# Patient Record
Sex: FEMALE | Race: BLACK OR AFRICAN AMERICAN | NOT HISPANIC OR LATINO | Employment: OTHER | URBAN - METROPOLITAN AREA
[De-identification: names, ages, dates, MRNs, and addresses within clinical notes are randomized per-mention and may not be internally consistent; named-entity substitution may affect disease eponyms.]

---

## 2017-01-23 ENCOUNTER — OFFICE VISIT (OUTPATIENT)
Dept: URGENT CARE | Facility: CLINIC | Age: 57
End: 2017-01-23
Payer: MEDICARE

## 2017-01-23 VITALS
DIASTOLIC BLOOD PRESSURE: 100 MMHG | HEIGHT: 66 IN | TEMPERATURE: 101 F | SYSTOLIC BLOOD PRESSURE: 160 MMHG | OXYGEN SATURATION: 97 % | HEART RATE: 97 BPM | RESPIRATION RATE: 20 BRPM | BODY MASS INDEX: 38.32 KG/M2 | WEIGHT: 238.44 LBS

## 2017-01-23 DIAGNOSIS — R11.0 NAUSEA: ICD-10-CM

## 2017-01-23 DIAGNOSIS — R68.89 FLU-LIKE SYMPTOMS: ICD-10-CM

## 2017-01-23 DIAGNOSIS — R50.9 FEVER, UNSPECIFIED FEVER CAUSE: Primary | ICD-10-CM

## 2017-01-23 LAB
CTP QC/QA: YES
FLUAV AG NPH QL: NEGATIVE
FLUBV AG NPH QL: NEGATIVE

## 2017-01-23 PROCEDURE — 99204 OFFICE O/P NEW MOD 45 MIN: CPT | Mod: S$GLB,,, | Performed by: NURSE PRACTITIONER

## 2017-01-23 PROCEDURE — 87804 INFLUENZA ASSAY W/OPTIC: CPT | Mod: QW,S$GLB,, | Performed by: NURSE PRACTITIONER

## 2017-01-23 PROCEDURE — 1159F MED LIST DOCD IN RCRD: CPT | Mod: S$GLB,,, | Performed by: NURSE PRACTITIONER

## 2017-01-23 PROCEDURE — 99999 PR PBB SHADOW E&M-NEW PATIENT-LVL III: CPT | Mod: PBBFAC,,, | Performed by: NURSE PRACTITIONER

## 2017-01-23 RX ORDER — OSELTAMIVIR PHOSPHATE 75 MG/1
75 CAPSULE ORAL 2 TIMES DAILY
Qty: 10 CAPSULE | Refills: 0 | Status: SHIPPED | OUTPATIENT
Start: 2017-01-23 | End: 2017-01-28

## 2017-01-23 RX ORDER — CLOPIDOGREL BISULFATE 75 MG/1
75 TABLET ORAL DAILY
COMMUNITY
End: 2020-01-10

## 2017-01-23 RX ORDER — METOPROLOL SUCCINATE 25 MG/1
25 TABLET, EXTENDED RELEASE ORAL 2 TIMES DAILY
COMMUNITY
End: 2017-04-26

## 2017-01-23 RX ORDER — GUAIFENESIN/DEXTROMETHORPHAN 100-10MG/5
10 SYRUP ORAL
Qty: 118 ML | Refills: 0 | Status: SHIPPED | OUTPATIENT
Start: 2017-01-23 | End: 2017-02-02

## 2017-01-23 RX ORDER — GABAPENTIN 300 MG/1
300 CAPSULE ORAL 2 TIMES DAILY
COMMUNITY
End: 2018-10-12 | Stop reason: SDUPTHER

## 2017-01-23 RX ORDER — OSELTAMIVIR PHOSPHATE 75 MG/1
75 CAPSULE ORAL 2 TIMES DAILY
Qty: 10 CAPSULE | Refills: 0 | Status: SHIPPED | OUTPATIENT
Start: 2017-01-23 | End: 2017-01-23 | Stop reason: SDUPTHER

## 2017-01-23 RX ORDER — CLONIDINE HYDROCHLORIDE 0.1 MG/1
0.1 TABLET ORAL
COMMUNITY
End: 2020-01-10

## 2017-01-23 RX ORDER — ACETAMINOPHEN 325 MG/1
650 TABLET ORAL
Status: COMPLETED | OUTPATIENT
Start: 2017-01-23 | End: 2017-01-23

## 2017-01-23 RX ORDER — ONDANSETRON 4 MG/1
4 TABLET, ORALLY DISINTEGRATING ORAL EVERY 8 HOURS PRN
Qty: 10 TABLET | Refills: 0 | Status: SHIPPED | OUTPATIENT
Start: 2017-01-23 | End: 2017-01-23 | Stop reason: SDUPTHER

## 2017-01-23 RX ORDER — ONDANSETRON 4 MG/1
4 TABLET, ORALLY DISINTEGRATING ORAL EVERY 8 HOURS PRN
Qty: 10 TABLET | Refills: 0 | Status: SHIPPED | OUTPATIENT
Start: 2017-01-23 | End: 2018-10-11

## 2017-01-23 RX ORDER — GUAIFENESIN/DEXTROMETHORPHAN 100-10MG/5
10 SYRUP ORAL
Qty: 118 ML | Refills: 0 | Status: SHIPPED | OUTPATIENT
Start: 2017-01-23 | End: 2017-01-23 | Stop reason: SDUPTHER

## 2017-01-23 RX ORDER — ATORVASTATIN CALCIUM 40 MG/1
40 TABLET, FILM COATED ORAL DAILY
COMMUNITY
End: 2017-04-20

## 2017-01-23 RX ORDER — INSULIN ASPART 100 [IU]/ML
40 INJECTION, SUSPENSION SUBCUTANEOUS EVERY MORNING
COMMUNITY

## 2017-01-23 RX ORDER — PANTOPRAZOLE SODIUM 40 MG/1
40 TABLET, DELAYED RELEASE ORAL DAILY
COMMUNITY
End: 2020-01-10

## 2017-01-23 RX ORDER — INSULIN ASPART 100 [IU]/ML
28 INJECTION, SUSPENSION SUBCUTANEOUS NIGHTLY
COMMUNITY
End: 2017-04-20 | Stop reason: SDUPTHER

## 2017-01-23 RX ADMIN — ACETAMINOPHEN 650 MG: 325 TABLET ORAL at 08:01

## 2017-01-23 NOTE — MR AVS SNAPSHOT
[facility]  [address]  [facility] [place] [address]  Phone: [phone]                  Julia Preston   2017 7:30 PM   Office Visit    Description:  Female : 1960   Provider:  Trena Hanna NP   Department:  [facility]           Reason for Visit     Otalgia     Generalized Body Aches     Fever     Nausea     Emesis           Diagnoses this Visit        Comments    Fever, unspecified fever cause    -  Primary Hydrate and rest.  Continue to monitor temp.  Take Tylenol/Ibruprofen.      Flu-like symptoms     Take medications as ordered.  If no improvement, follow up with pcp.      Nausea                To Do List           Future Appointments        Provider Department Dept Phone    2017 1:30 PM Mini Ross MD [facility] [phone]      Goals (5 Years of Data)     None       These Medications        Disp Refills Start End    dextromethorphan-guaifenesin  mg/5 ml (ROBITUSSIN-DM)  mg/5 mL liquid 118 mL 0 2017    Take 10 mLs by mouth every 4 to 6 hours as needed. - Oral    Pharmacy: [facility] [address] [place] [address] Ph #: [phone]       oseltamivir (TAMIFLU) 75 MG capsule 10 capsule 0 2017    Take 1 capsule (75 mg total) by mouth 2 (two) times daily. - Oral    Pharmacy: [facility] [address] [place] [address] Ph #: [phone]       ondansetron (ZOFRAN-ODT) 4 MG TbDL 10 tablet 0 2017     Take 1 tablet (4 mg total) by mouth every 8 (eight) hours as needed (nausea). - Oral    Pharmacy: [facility] [address] [place] [address] Ph #: [phone]         [facility] On [facility]     [facility] On Call Nurse Care Line -  Assistance  Registered nurses in the [facility] On Call Center provide clinical advisement, health education, appointment booking, and other advisory services.  Call for this free service at [phone].              Medications           START taking these NEW medications        Refills    dextromethorphan-guaifenesin  mg/5 ml (ROBITUSSIN-DM)  mg/5 mL liquid 0    Sig: Take 10 mLs by mouth every 4 to 6 hours as needed.    Class: Normal    Route: Oral    oseltamivir (TAMIFLU) 75 MG capsule 0    Sig: Take 1 capsule (75 mg total) by mouth 2 (two) times daily.    Class: Normal    Route: Oral    ondansetron (ZOFRAN-ODT) 4 MG TbDL 0    Sig: Take 1 tablet (4 mg total) by mouth every 8 (eight) hours as needed (nausea).    Class: Normal    Route: Oral           Verify that the below list of medications is an accurate representation of the medications you are currently taking.  If none reported, the list may be blank. If incorrect, please contact your healthcare provider. Carry this list with you in case of emergency.           Current Medications     atorvastatin (LIPITOR) 40 MG tablet Take 40 mg by mouth once daily.    cloNIDine (CATAPRES) 0.1 MG tablet Take 0.1 mg by mouth.    clopidogrel (PLAVIX) 75 mg tablet Take 75 mg by mouth once daily.    dextromethorphan-guaifenesin  mg/5 ml (ROBITUSSIN-DM)  mg/5 mL liquid Take 10 mLs by mouth every 4 to 6 hours as needed.    gabapentin (NEURONTIN) 300 MG capsule Take 300 mg by mouth 2 (two) times daily.    insulin aspart protamine-insulin aspart (NOVOLOG 70/30) 100 unit/mL (70-30) InPn pen Inject 40 Units into the skin every morning.    insulin aspart protamine-insulin aspart (NOVOLOG 70/30) 100 unit/mL (70-30) InPn pen Inject 28 Units into the skin every evening.    metoprolol succinate (TOPROL-XL) 25 MG 24 hr tablet Take 25 mg by mouth 2 (two) times daily.    ondansetron (ZOFRAN-ODT) 4 MG TbDL Take 1 tablet (4 mg total) by mouth every 8 (eight) hours as needed (nausea).    oseltamivir (TAMIFLU) 75 MG capsule Take 1 capsule (75 mg total) by mouth 2 (two) times daily.    pantoprazole (PROTONIX) 40 MG tablet Take 40 mg by mouth once daily.           Clinical Reference  "Information           Vital Signs - Last Recorded  Most recent update: 1/23/2017  7:44 PM by Elvie Underwood LPN    BP Pulse Temp Resp Ht Wt    (!) 160/100 (BP Location: Right arm, Patient Position: Sitting, BP Method: Manual) 97 (!) 101 °F (38.3 °C) (Oral) 20 5' 6" (1.676 m) 108.2 kg (238 lb 6.8 oz)    SpO2 BMI             97% 38.48 kg/m2         Blood Pressure          Most Recent Value    BP  (!)  160/100      Allergies as of 1/23/2017     Lortab [Hydrocodone-acetaminophen]      Immunizations Administered on Date of Encounter - 1/23/2017     None      Orders Placed During Today's Visit      Normal Orders This Visit    POCT Influenza A/B       [facility] Sign-Up     Activating your [facility] account is as easy as 1-2-3!     1) Visit [email or web address], select Sign Up Now, enter this activation code and your date of birth, then select Next.  --  Expires: 3/9/2017  7:55 PM      2) Create a username and password to use when you visit [facility] in the future and select a security question in case you lose your password and select Next.    3) Enter your e-mail address and click Sign Up!    Additional Information  If you have questions, please e-mail [email or web address] or call [phone] to talk to our [facility] staff. Remember, [facility] is NOT to be used for urgent needs. For medical emergencies, dial 911.         Instructions      Febrile Illness, Uncertain Cause (Adult)  You have a fever, but the cause is not certain. A fever is a natural reaction of the body to an illness such as infection due to a virus or bacteria. In most cases, the temperature itself is not harmful. It actually helps the body fight infections. A fever does not need to be treated unless you feel very uncomfortable.  Sometimes a fever can be an early sign of a more serious infection, so make sure to follow up if your condition worsens.  Home care  Unless given other instructions by your healthcare provider, follow these " guidelines when caring for yourself at home.  General care  · If your symptoms are severe, rest at home for the first 2 to 3 days. When you resume activity, don't let yourself get too tired.  · Do not smoke. Also avoid being exposed to secondhand smoke.  · Your appetite may be poor, so a light diet is fine. Avoid dehydration by drinking 6 to 8 glasses of fluids per day (such as water, soft drinks, sports drinks, juices, tea, or soup). Extra fluids will help loosen secretions in the nose and lungs.  Medicines  · You can take acetaminophen or ibuprofen for pain, unless you were given a different fever-reducing/pain medicine to use. (Note: If you have chronic liver or kidney disease or have ever had a stomach ulcer or gastrointestinal bleeding, talk with your healthcare provider before using these medicines. Also talk to your provider if you are taking medicine to prevent blood clots.) Aspirin should never be given to anyone younger than 18 years of age who is ill with a viral infection or fever. It may cause severe liver or brain damage.  · If you were given antibiotics, take them until they are used up, or your healthcare provider tells you to stop. It is important to finish the antibiotics even though you feel better. This is to make sure the infection has cleared. Be aware that antibiotics are not usually given for a fever with an unknown cause.  · Over-the-counter medicines will not shorten the duration of the illness. However, they may be helpful for the following symptoms: cough, sore throat, or nasal and sinus congestion. Ask your pharmacist for product suggestions. (Note: Do not use decongestants if you have high blood pressure.)  Follow-up care  Follow up with your healthcare provider, or as advised.  · If a culture was done, you will be notified if your treatment needs to be changed. You can call as directed for the results.  · If X-rays, a CT, or an ultrasound were done, a specialist will review them. You  will be notified of any findings that may affect your care.  Call 911  Contact emergency services right away if any of these occur:  · Trouble breathing or swallowing, or wheezing  · Chest pain  · Confusion  · Extreme drowsiness or trouble awakening  · Fainting or loss of consciousness  · Rapid heart rate  · Low blood pressure  · Vomiting blood, or large amounts of blood in stool  · Seizure  When to seek medical advice  Call your healthcare provider right away if any of these occur:  · Cough with lots of colored sputum (mucus) or blood in your sputum  · Severe headache  · Face, neck, throat, or ear pain  · Feeling drowsy  · Abdominal pain  · Repeated vomiting or diarrhea  · Joint pain or a new rash  · Burning when urinating  · Fever of 100.4°F (38°C) or higher, that does not get better after taking fever-reducing medicine  · Feeling weak or dizzy  © 7724-6573 [employer]. [address] [place] [address], [place] [address]. All rights reserved. This information is not intended as a substitute for professional medical care. Always follow your healthcare professional's instructions.

## 2017-01-24 NOTE — PROGRESS NOTES
Subjective:       Patient ID: Julia Preston is a 56 y.o. female.    Chief Complaint: Otalgia; Generalized Body Aches; Fever; Nausea; and Emesis    HPI Comments: Patient presents with fever, body aches, and upper respiratory symptoms that started on yesterday.      Fever    This is a new problem. The current episode started yesterday. The problem occurs intermittently. The problem has been unchanged. The maximum temperature noted was 101 to 101.9 F. The temperature was taken using a tympanic thermometer. Associated symptoms include coughing, ear pain, muscle aches, nausea and vomiting (with coughing ). Pertinent negatives include no abdominal pain, headaches, rash, urinary pain or wheezing. She has tried nothing for the symptoms.     Review of Systems   Constitutional: Positive for fever.   HENT: Positive for ear pain, rhinorrhea and sinus pressure.    Eyes: Negative for discharge and redness.   Respiratory: Positive for cough. Negative for wheezing.    Gastrointestinal: Positive for nausea and vomiting (with coughing ). Negative for abdominal pain.   Genitourinary: Negative for dysuria.   Musculoskeletal: Positive for myalgias. Negative for joint swelling.   Skin: Negative for rash.   Neurological: Negative for dizziness and headaches.   Psychiatric/Behavioral: Negative for agitation and confusion.       Objective:      Physical Exam   Constitutional: She is oriented to person, place, and time. She appears well-developed and well-nourished. She appears lethargic. She has a sickly appearance. No distress.   HENT:   Head: Normocephalic and atraumatic.   Right Ear: Hearing, external ear and ear canal normal. A middle ear effusion (mild) is present.   Left Ear: Hearing, external ear and ear canal normal. A middle ear effusion (mild) is present.   Nose: Mucosal edema and rhinorrhea present.   Mouth/Throat: Oropharynx is clear and moist.   Neck: Normal range of motion.   Cardiovascular: Normal rate and regular rhythm.     Pulmonary/Chest: Effort normal and breath sounds normal.   Musculoskeletal: Normal range of motion.   Neurological: She is oriented to person, place, and time. She appears lethargic.   Skin: Skin is warm and dry.   Psychiatric: She has a normal mood and affect.   Nursing note and vitals reviewed.      Assessment:       1. Fever, unspecified fever cause    2. Flu-like symptoms    3. Nausea        Plan:         Fever, unspecified fever cause  Comments:  Hydrate and rest.  Continue to monitor temp.  Take Tylenol/Ibruprofen.    Orders:  -     Discontinue: oseltamivir (TAMIFLU) 75 MG capsule; Take 1 capsule (75 mg total) by mouth 2 (two) times daily.  Dispense: 10 capsule; Refill: 0  -     POCT Influenza A/B  -     oseltamivir (TAMIFLU) 75 MG capsule; Take 1 capsule (75 mg total) by mouth 2 (two) times daily.  Dispense: 10 capsule; Refill: 0  -     acetaminophen tablet 650 mg; Take 2 tablets (650 mg total) by mouth one time.    Flu-like symptoms  Comments:  Take medications as ordered.  If no improvement, follow up with pcp.    Orders:  -     Discontinue: oseltamivir (TAMIFLU) 75 MG capsule; Take 1 capsule (75 mg total) by mouth 2 (two) times daily.  Dispense: 10 capsule; Refill: 0  -     POCT Influenza A/B  -     oseltamivir (TAMIFLU) 75 MG capsule; Take 1 capsule (75 mg total) by mouth 2 (two) times daily.  Dispense: 10 capsule; Refill: 0    Nausea  -     Discontinue: ondansetron (ZOFRAN-ODT) 4 MG TbDL; Take 1 tablet (4 mg total) by mouth every 8 (eight) hours as needed (nausea).  Dispense: 10 tablet; Refill: 0  -     ondansetron (ZOFRAN-ODT) 4 MG TbDL; Take 1 tablet (4 mg total) by mouth every 8 (eight) hours as needed (nausea).  Dispense: 10 tablet; Refill: 0    Other orders  -     Discontinue: dextromethorphan-guaifenesin  mg/5 ml (ROBITUSSIN-DM)  mg/5 mL liquid; Take 10 mLs by mouth every 4 to 6 hours as needed.  Dispense: 118 mL; Refill: 0  -     dextromethorphan-guaifenesin  mg/5 ml  (ROBITUSSIN-DM)  mg/5 mL liquid; Take 10 mLs by mouth every 4 to 6 hours as needed.  Dispense: 118 mL; Refill: 0        Instructed to take all medications as ordered.  Informed if no improvement or symptoms worsens to follow up with primary care physician.  Informed to hydrate and rest.  Printed and review after visit summary with patient.

## 2017-01-24 NOTE — PATIENT INSTRUCTIONS
Febrile Illness, Uncertain Cause (Adult)  You have a fever, but the cause is not certain. A fever is a natural reaction of the body to an illness such as infection due to a virus or bacteria. In most cases, the temperature itself is not harmful. It actually helps the body fight infections. A fever does not need to be treated unless you feel very uncomfortable.  Sometimes a fever can be an early sign of a more serious infection, so make sure to follow up if your condition worsens.  Home care  Unless given other instructions by your healthcare provider, follow these guidelines when caring for yourself at home.  General care  · If your symptoms are severe, rest at home for the first 2 to 3 days. When you resume activity, don't let yourself get too tired.  · Do not smoke. Also avoid being exposed to secondhand smoke.  · Your appetite may be poor, so a light diet is fine. Avoid dehydration by drinking 6 to 8 glasses of fluids per day (such as water, soft drinks, sports drinks, juices, tea, or soup). Extra fluids will help loosen secretions in the nose and lungs.  Medicines  · You can take acetaminophen or ibuprofen for pain, unless you were given a different fever-reducing/pain medicine to use. (Note: If you have chronic liver or kidney disease or have ever had a stomach ulcer or gastrointestinal bleeding, talk with your healthcare provider before using these medicines. Also talk to your provider if you are taking medicine to prevent blood clots.) Aspirin should never be given to anyone younger than 18 years of age who is ill with a viral infection or fever. It may cause severe liver or brain damage.  · If you were given antibiotics, take them until they are used up, or your healthcare provider tells you to stop. It is important to finish the antibiotics even though you feel better. This is to make sure the infection has cleared. Be aware that antibiotics are not usually given for a fever with an unknown  cause.  · Over-the-counter medicines will not shorten the duration of the illness. However, they may be helpful for the following symptoms: cough, sore throat, or nasal and sinus congestion. Ask your pharmacist for product suggestions. (Note: Do not use decongestants if you have high blood pressure.)  Follow-up care  Follow up with your healthcare provider, or as advised.  · If a culture was done, you will be notified if your treatment needs to be changed. You can call as directed for the results.  · If X-rays, a CT, or an ultrasound were done, a specialist will review them. You will be notified of any findings that may affect your care.  Call 911  Contact emergency services right away if any of these occur:  · Trouble breathing or swallowing, or wheezing  · Chest pain  · Confusion  · Extreme drowsiness or trouble awakening  · Fainting or loss of consciousness  · Rapid heart rate  · Low blood pressure  · Vomiting blood, or large amounts of blood in stool  · Seizure  When to seek medical advice  Call your healthcare provider right away if any of these occur:  · Cough with lots of colored sputum (mucus) or blood in your sputum  · Severe headache  · Face, neck, throat, or ear pain  · Feeling drowsy  · Abdominal pain  · Repeated vomiting or diarrhea  · Joint pain or a new rash  · Burning when urinating  · Fever of 100.4°F (38°C) or higher, that does not get better after taking fever-reducing medicine  · Feeling weak or dizzy  © 9368-2707 [employer]. [address] [place] [address], [place] [address]. All rights reserved. This information is not intended as a substitute for professional medical care. Always follow your healthcare professional's instructions.

## 2017-04-20 ENCOUNTER — OFFICE VISIT (OUTPATIENT)
Dept: OBSTETRICS AND GYNECOLOGY | Facility: CLINIC | Age: 57
End: 2017-04-20
Payer: MEDICARE

## 2017-04-20 VITALS
SYSTOLIC BLOOD PRESSURE: 134 MMHG | HEIGHT: 66 IN | DIASTOLIC BLOOD PRESSURE: 80 MMHG | WEIGHT: 239.31 LBS | BODY MASS INDEX: 38.46 KG/M2

## 2017-04-20 DIAGNOSIS — Z01.419 ENCOUNTER FOR GYNECOLOGICAL EXAMINATION (GENERAL) (ROUTINE) WITHOUT ABNORMAL FINDINGS: Primary | ICD-10-CM

## 2017-04-20 DIAGNOSIS — Z12.31 SCREENING MAMMOGRAM, ENCOUNTER FOR: ICD-10-CM

## 2017-04-20 PROCEDURE — 99396 PREV VISIT EST AGE 40-64: CPT | Mod: S$GLB,,, | Performed by: OBSTETRICS & GYNECOLOGY

## 2017-04-20 PROCEDURE — 99999 PR PBB SHADOW E&M-EST. PATIENT-LVL III: CPT | Mod: PBBFAC,,, | Performed by: OBSTETRICS & GYNECOLOGY

## 2017-04-20 RX ORDER — LEVOFLOXACIN 500 MG/1
TABLET, FILM COATED ORAL
COMMUNITY
Start: 2017-01-25 | End: 2017-08-16

## 2017-04-20 RX ORDER — TRAMADOL HYDROCHLORIDE 50 MG/1
TABLET ORAL
COMMUNITY
Start: 2017-02-03 | End: 2017-08-16

## 2017-04-20 RX ORDER — ATORVASTATIN CALCIUM 80 MG/1
TABLET, FILM COATED ORAL
COMMUNITY
Start: 2017-03-28 | End: 2017-04-20

## 2017-04-20 RX ORDER — METOPROLOL TARTRATE 25 MG/1
TABLET, FILM COATED ORAL
COMMUNITY
Start: 2017-03-21 | End: 2017-08-16

## 2017-04-20 NOTE — PROGRESS NOTES
Subjective:       Patient ID: Julia Preston is a 56 y.o. female.    Chief Complaint:  Annual Exam      History of Present Illness  HPI  Annual Exam-Postmenopausal  Patient presents for annual exam. The patient has no complaints today. The patient is sexually active--denies pelvic pain; denies vaginal dryness. GYN screening history: last pap: approximate date cannot recall and was normal. The patient is not taking hormone replacement therapy. Patient denies post-menopausal vaginal bleeding. The patient wears seatbelts: yes. The patient participates in regular exercise: walks 30min bid; . Has the patient ever been transfused or tattooed?: no. The patient reports that there is not domestic violence in her life.    Denies hot flushes, night sweats, reports no problems with insomnia  Denies leak of urine;        GYN & OB History  No LMP recorded. Patient has had a hysterectomy.   Date of Last Pap: No result found    OB History    Para Term  AB SAB TAB Ectopic Multiple Living   4 4 4      1 5      # Outcome Date GA Lbr Erik/2nd Weight Sex Delivery Anes PTL Lv   4A Term      CS-Unspec   Y   4B Term      CS-Unspec   Y   3 Term      Vag-Spont   Y   2 Term      Vag-Spont   Y   1 Term      Vag-Spont   Y          Review of Systems  Review of Systems   Constitutional: Negative for activity change, appetite change, chills, diaphoresis, fatigue, fever and unexpected weight change.   HENT: Negative for mouth sores and tinnitus.    Eyes: Negative for discharge and visual disturbance.   Respiratory: Negative for cough, shortness of breath and wheezing.    Cardiovascular: Negative for chest pain, palpitations and leg swelling.   Gastrointestinal: Negative for abdominal pain, bloating, blood in stool, constipation, diarrhea, nausea and vomiting.   Endocrine: Negative for diabetes, hair loss, hot flashes, hyperthyroidism and hypothyroidism.   Genitourinary: Negative for decreased libido, dyspareunia, dysuria, flank pain,  frequency, genital sores, hematuria, menorrhagia, menstrual problem, pelvic pain, urgency, vaginal bleeding, vaginal discharge, vaginal pain, dysmenorrhea, urinary incontinence, postcoital bleeding, postmenopausal bleeding and vaginal odor.   Musculoskeletal: Negative for back pain and myalgias.   Skin:  Negative for rash, no acne and hair changes.   Neurological: Negative for seizures, syncope, numbness and headaches.   Hematological: Negative for adenopathy. Does not bruise/bleed easily.   Psychiatric/Behavioral: Negative for depression and sleep disturbance. The patient is not nervous/anxious.    Breast: Negative for breast mass, breast pain, nipple discharge and skin changes          Objective:    Physical Exam:   Constitutional: She appears well-developed.     Eyes: Conjunctivae and EOM are normal. Pupils are equal, round, and reactive to light.    Neck: Normal range of motion. Neck supple.     Pulmonary/Chest: Effort normal. Right breast exhibits no mass, no nipple discharge, no skin change and no tenderness. Left breast exhibits no mass, no nipple discharge, no skin change and no tenderness. Breasts are symmetrical.        Abdominal: Soft.     Genitourinary: Rectum normal and vagina normal. Pelvic exam was performed with patient supine. Uterus is absent. Right adnexum displays no mass and no tenderness. Left adnexum displays no mass and no tenderness. No erythema, bleeding, rectocele, cystocele or unspecified prolapse of vaginal walls in the vagina. No vaginal discharge found. Vaginal cuff normal.Labial bartholins normal.Cervix exhibits absence.           Musculoskeletal: Normal range of motion.       Neurological: She is alert.    Skin: Skin is warm.    Psychiatric: She has a normal mood and affect.          Assessment:        1. Encounter for gynecological examination (general) (routine) without abnormal findings    2. Screening mammogram, encounter for               Plan:      Continue annual well woman  exam.   pap not indicated due to age and hx of nml pap  Continue diet, exercise, weight loss  Prefers [facility] at [facility]

## 2017-04-20 NOTE — MR AVS SNAPSHOT
[facility]  [address] [place] [address] [address]  [place] [address]  Phone: [phone]                  Julia CLAY Kary   2017 1:30 PM   Office Visit    Description:  Female : 1960   Provider:  Mini Ross MD   Department:  [facility]           Reason for Visit     Annual Exam           Diagnoses this Visit        Comments    Encounter for gynecological examination (general) (routine) without abnormal findings    -  Primary     Screening mammogram, encounter for                To Do List           Future Appointments        Provider Department Dept Phone    2017 12:30 PM [facility] [id number]-SCR [facility] [phone]      Goals (5 Years of Data)     None      [facility]     [facility] Care Line -  Assistance  Unless otherwise directed by your provider, please contact [facility], our nurse care line that is available for  assistance.     Registered nurses in the [facility] provide: appointment scheduling, clinical advisement, health education, and other advisory services.  Call: [phone] (toll free)               Medications           STOP taking these medications     atorvastatin (LIPITOR) 40 MG tablet Take 40 mg by mouth once daily.    atorvastatin (LIPITOR) 80 MG tablet            Verify that the below list of medications is an accurate representation of the medications you are currently taking.  If none reported, the list may be blank. If incorrect, please contact your healthcare provider. Carry this list with you in case of emergency.           Current Medications     cloNIDine (CATAPRES) 0.1 MG tablet Take 0.1 mg by mouth.    clopidogrel (PLAVIX) 75 mg tablet Take 75 mg by mouth once daily.    gabapentin (NEURONTIN) 300 MG capsule Take 300 mg by mouth 2 (two) times daily.    metoprolol succinate (TOPROL-XL) 25 MG 24 hr tablet Take 25 mg by mouth 2 (two) times daily.    metoprolol  "tartrate (LOPRESSOR) 25 MG tablet     ondansetron (ZOFRAN-ODT) 4 MG TbDL Take 1 tablet (4 mg total) by mouth every 8 (eight) hours as needed (nausea).    pantoprazole (PROTONIX) 40 MG tablet Take 40 mg by mouth once daily.    tramadol (ULTRAM) 50 mg tablet     insulin aspart protamine-insulin aspart (NOVOLOG 70/30) 100 unit/mL (70-30) InPn pen Inject 40 Units into the skin every morning.    levoFLOXacin (LEVAQUIN) 500 MG tablet            Clinical Reference Information           Your Vitals Were     BP Height Weight BMI       134/80 5' 6" (1.676 m) 108.6 kg (239 lb 5 oz) 38.63 kg/m2       Blood Pressure          Most Recent Value    BP  134/80      Allergies as of 4/20/2017     Lortab [Hydrocodone-acetaminophen]      Immunizations Administered on Date of Encounter - 4/20/2017     None      Orders Placed During Today's Visit     Future Labs/Procedures Expected by Expires    Mammo Digital Screening Bilat with CAD  4/20/2017 6/21/2018      [facility] Sign-Up     Activating your [facility] account is as easy as 1-2-3!     1) Visit [email or web address], select Sign Up Now, enter this activation code and your date of birth, then select Next.  [email or web address]  Expires: 6/4/2017  1:31 PM      2) Create a username and password to use when you visit [facility] in the future and select a security question in case you lose your password and select Next.    3) Enter your e-mail address and click Sign Up!    Additional Information  If you have questions, please e-mail [email or web address] or call [phone] to talk to our [facility] staff. Remember, [facility] is NOT to be used for urgent needs. For medical emergencies, dial 911.         Language Assistance Services     ATTENTION: Language assistance services are available, free of charge. Please call [phone].      ATENCIÓN: Si habla español, tiene a orozco disposición servicios gratuitos de asistencia lingüística. Llame al [phone].     CHÚ Ý: N?u b?n nói Ti?ng Vi?t, có các " d?ch v? h? tr? ngôn ng? mi?n phí dành cho b?n. G?i s? [phone].         [facility] complies with applicable Federal civil rights laws and does not discriminate on the basis of race, color, national origin, age, disability, or sex.

## 2017-04-26 ENCOUNTER — OFFICE VISIT (OUTPATIENT)
Dept: PODIATRY | Facility: CLINIC | Age: 57
End: 2017-04-26
Payer: MEDICARE

## 2017-04-26 VITALS
SYSTOLIC BLOOD PRESSURE: 206 MMHG | WEIGHT: 246.69 LBS | HEIGHT: 66 IN | DIASTOLIC BLOOD PRESSURE: 116 MMHG | BODY MASS INDEX: 39.65 KG/M2

## 2017-04-26 DIAGNOSIS — E11.51 TYPE II DIABETES MELLITUS WITH PERIPHERAL CIRCULATORY DISORDER: Primary | ICD-10-CM

## 2017-04-26 DIAGNOSIS — M21.41 PES PLANUS OF BOTH FEET: ICD-10-CM

## 2017-04-26 DIAGNOSIS — L84 PRE-ULCERATIVE CALLUSES: ICD-10-CM

## 2017-04-26 DIAGNOSIS — M20.10 HAV (HALLUX ABDUCTO VALGUS), UNSPECIFIED LATERALITY: ICD-10-CM

## 2017-04-26 DIAGNOSIS — M21.42 PES PLANUS OF BOTH FEET: ICD-10-CM

## 2017-04-26 PROBLEM — K21.9 GERD (GASTROESOPHAGEAL REFLUX DISEASE): Status: ACTIVE | Noted: 2017-04-26

## 2017-04-26 PROBLEM — I10 HYPERTENSION: Status: ACTIVE | Noted: 2017-04-26

## 2017-04-26 PROBLEM — E78.5 HYPERLIPIDEMIA: Status: ACTIVE | Noted: 2017-04-26

## 2017-04-26 PROCEDURE — 3080F DIAST BP >= 90 MM HG: CPT | Mod: S$GLB,,, | Performed by: PODIATRIST

## 2017-04-26 PROCEDURE — 99499 UNLISTED E&M SERVICE: CPT | Mod: S$GLB,,, | Performed by: PODIATRIST

## 2017-04-26 PROCEDURE — 11055 PARING/CUTG B9 HYPRKER LES 1: CPT | Mod: Q8,S$GLB,, | Performed by: PODIATRIST

## 2017-04-26 PROCEDURE — 1160F RVW MEDS BY RX/DR IN RCRD: CPT | Mod: S$GLB,,, | Performed by: PODIATRIST

## 2017-04-26 PROCEDURE — 99204 OFFICE O/P NEW MOD 45 MIN: CPT | Mod: 25,S$GLB,, | Performed by: PODIATRIST

## 2017-04-26 PROCEDURE — 99999 PR PBB SHADOW E&M-EST. PATIENT-LVL III: CPT | Mod: PBBFAC,,, | Performed by: PODIATRIST

## 2017-04-26 PROCEDURE — 3077F SYST BP >= 140 MM HG: CPT | Mod: S$GLB,,, | Performed by: PODIATRIST

## 2017-04-26 NOTE — MR AVS SNAPSHOT
[facility]  [address] [facility] [address] [place] [address]  Phone: [phone]  Fax: [phone]                  Julia Preston   2017 2:20 PM   Office Visit    Description:  Female : 1960   Provider:  Viet Muir DPM   Department:  [facility]           Reason for Visit     Callouses           Diagnoses this Visit        Comments    Type II diabetes mellitus with peripheral circulatory disorder    -  Primary     Pre-ulcerative calluses         Pes planus of both feet         HAV (hallux abducto valgus), unspecified laterality                To Do List           Future Appointments        Provider Department Dept Phone    2017 12:30 PM [place] [address] [phone]    2017 3:00 PM Viet Muir DPM [facility] [phone]      Goals (5 Years of Data)     None      [facility] Call     [facility] Nurse Care Line -  Assistance  Unless otherwise directed by your provider, please contact [facility], our nurse care line that is available for  assistance.     Registered nurses in the [facility] provide: appointment scheduling, clinical advisement, health education, and other advisory services.  Call: [phone] (toll free)               Medications           STOP taking these medications     metoprolol succinate (TOPROL-XL) 25 MG 24 hr tablet Take 25 mg by mouth 2 (two) times daily.           Verify that the below list of medications is an accurate representation of the medications you are currently taking.  If none reported, the list may be blank. If incorrect, please contact your healthcare provider. Carry this list with you in case of emergency.           Current Medications     cloNIDine (CATAPRES) 0.1 MG tablet Take 0.1 mg by mouth.    clopidogrel (PLAVIX) 75 mg tablet Take 75 mg by mouth once daily.    gabapentin (NEURONTIN) 300 MG capsule Take 300 mg by mouth 2 (two) times daily.    insulin aspart  "protamine-insulin aspart (NOVOLOG 70/30) 100 unit/mL (70-30) InPn pen Inject 40 Units into the skin every morning.    levoFLOXacin (LEVAQUIN) 500 MG tablet     metoprolol tartrate (LOPRESSOR) 25 MG tablet     ondansetron (ZOFRAN-ODT) 4 MG TbDL Take 1 tablet (4 mg total) by mouth every 8 (eight) hours as needed (nausea).    pantoprazole (PROTONIX) 40 MG tablet Take 40 mg by mouth once daily.    tramadol (ULTRAM) 50 mg tablet            Clinical Reference Information           Your Vitals Were     BP Height Weight BMI       206/116 (BP Location: Left arm, Patient Position: Sitting, BP Method: Manual) 5' 6" (1.676 m) 111.9 kg (246 lb 11.1 oz) 39.82 kg/m2       Blood Pressure          Most Recent Value    BP  (!)  206/116 [pt states took bp meds, denies feelings of abnormality]      Allergies as of 4/26/2017     Lortab [Hydrocodone-acetaminophen]      Immunizations Administered on Date of Encounter - 4/26/2017     None      Orders Placed During Today's Visit      Normal Orders This Visit    DIABETIC SHOES FOR HOME USE       [facility] Sign-Up     Activating your [facility] account is as easy as 1-2-3!     1) Visit [email or web address], select Sign Up Now, enter this activation code and your date of birth, then select Next.  [email or web address]  Expires: 6/4/2017  1:31 PM      2) Create a username and password to use when you visit [facility] in the future and select a security question in case you lose your password and select Next.    3) Enter your e-mail address and click Sign Up!    Additional Information  If you have questions, please e-mail [email or web address] or call [phone] to talk to our [facility] staff. Remember, [facility] is NOT to be used for urgent needs. For medical emergencies, dial 911.         Instructions    [facility]  [address] [place] [address]   [phone]               Language Assistance Services     ATTENTION: Language assistance services are available, free of charge. Please call " [phone].      ATENCIÓN: Si habla español, tiene a orozco disposición servicios gratuitos de asistencia lingüística. Llame al [phone].     CHÚ Ý: N?u b?n nói Ti?ng Vi?t, có các d?ch v? h? tr? ngôn ng? mi?n phí dành cho b?n. G?i s? [phone].         [facility] complies with applicable Federal civil rights laws and does not discriminate on the basis of race, color, national origin, age, disability, or sex.

## 2017-04-26 NOTE — PROGRESS NOTES
Subjective:     Patient ID: Julia Preston is a 56 y.o. female.    Chief Complaint: Callouses (diabetic patient, bilateral foot (below the great toes), pain when walking due to the calluses)    Julia is a 56 y.o. female who presents to the clinic for evaluation and treatment of high risk feet. Julia has a past medical history of Diabetes mellitus, type 2; GERD (gastroesophageal reflux disease); Hyperlipidemia; and Hypertension. The patient's chief complaint is painful callus left foot. Patient states she has had wound there about a 1 year ago. Patient states callus and area became painful about 2 weeks ago. Patient denies any drainage from the foot. Patient states she wears diabetic tennis shoes in inserts daily. Patient also admits problems with circulation in the past.  This patient has documented high risk feet requiring routine maintenance secondary to diabetes mellitis and those secondary complications of diabetes, as mentioned..    PCP: Idania Currie MD    Date Last Seen by PCP: 4/2017    Current shoe gear:  Affected Foot: Rx diabetic extra depth shoes and custom accommodative insoles     Unaffected Foot: Rx diabetic extra depth shoes and custom accommodative insoles  Patient Active Problem List   Diagnosis    GERD (gastroesophageal reflux disease)    Type II diabetes mellitus with peripheral circulatory disorder    Hyperlipidemia    Hypertension       Medication List with Changes/Refills   Current Medications    CLONIDINE (CATAPRES) 0.1 MG TABLET    Take 0.1 mg by mouth.    CLOPIDOGREL (PLAVIX) 75 MG TABLET    Take 75 mg by mouth once daily.    GABAPENTIN (NEURONTIN) 300 MG CAPSULE    Take 300 mg by mouth 2 (two) times daily.    INSULIN ASPART PROTAMINE-INSULIN ASPART (NOVOLOG 70/30) 100 UNIT/ML (70-30) INPN PEN    Inject 40 Units into the skin every morning.    LEVOFLOXACIN (LEVAQUIN) 500 MG TABLET        METOPROLOL TARTRATE (LOPRESSOR) 25 MG TABLET        ONDANSETRON (ZOFRAN-ODT) 4 MG TBDL    " Take 1 tablet (4 mg total) by mouth every 8 (eight) hours as needed (nausea).    PANTOPRAZOLE (PROTONIX) 40 MG TABLET    Take 40 mg by mouth once daily.    TRAMADOL (ULTRAM) 50 MG TABLET       Discontinued Medications    METOPROLOL SUCCINATE (TOPROL-XL) 25 MG 24 HR TABLET    Take 25 mg by mouth 2 (two) times daily.       Review of patient's allergies indicates:   Allergen Reactions    Lortab [hydrocodone-acetaminophen] Itching       Past Surgical History:   Procedure Laterality Date    CARDIAC SURGERY  2013       Family History   Problem Relation Age of Onset    Hypertension Mother     Diabetes Mother     Hypertension Brother     Diabetes Brother     Seizures Son        Social History     Social History    Marital status:      Spouse name: N/A    Number of children: N/A    Years of education: N/A     Occupational History    Not on file.     Social History Main Topics    Smoking status: Never Smoker    Smokeless tobacco: Not on file    Alcohol use No    Drug use: No    Sexual activity: Yes     Partners: Male     Other Topics Concern    Not on file     Social History Narrative       Vitals:    04/26/17 1417   BP: (!) 206/116   Weight: 111.9 kg (246 lb 11.1 oz)   Height: 5' 6" (1.676 m)   PainSc: 0-No pain       Review of Systems   Constitutional: Negative for chills and fever.   Respiratory: Negative for shortness of breath.    Cardiovascular: Negative for chest pain, palpitations, orthopnea, claudication and leg swelling.   Gastrointestinal: Negative for diarrhea, nausea and vomiting.   Musculoskeletal: Negative for joint pain.   Skin: Negative for rash.   Neurological: Positive for tingling and sensory change. Negative for dizziness, focal weakness and weakness.   Psychiatric/Behavioral: Negative.              Objective:      PHYSICAL EXAM: Apperance: Alert and orient in no distress,well developed, and with good attention to grooming and body habits  Patient presents ambulating in diabetic " shoes with inserts.   LOWER EXTREMITY EXAM:  VASCULAR: Dorsalis pedis pulses 0/4(monphasic with doppler) bilateral and Posterior Tibial pulses 1/4 bilateral. Capillary fill time <4 seconds bilateral. No edema observed bilateral. Varicosities present bilateral. Skin temperature of the lower extremities is warm to cool, proximal to distal. Hair growth absent bilateral.  DERMATOLOGICAL: No skin rashes, subcutaneous nodules, lesions, or ulcers observed bilateral. Nails 1-5 bilateral thickened, and discolored with subungual debris. Webspaces 1-4 clean, dry and without evidence of break in skin integrity bilateral. Mild hyperkeratotic lesion with dry hemorraghic base noted to left plantar 1st submetatarsal. No open area post debridement. No erythema, drainage, or increased temp noted to area.   NEUROLOGICAL: Light touch, sharp-dull, proprioception all present and equal bilaterally.  Vibratory sensation diminished at bilateral hallux. Protective sensation absent at 4/10 sites as tested with a [place]-Brandy 5.07 monofilament.   MUSCULOSKELETAL: Muscle strength is 5/5 for foot inverters, everters, plantarflexors, and dorsiflexors. Muscle tone is normal.         Assessment:       Encounter Diagnoses   Name Primary?    Type II diabetes mellitus with peripheral circulatory disorder Yes    Pre-ulcerative calluses - Left Foot     Pes planus of both feet     HAV (hallux abducto valgus), unspecified laterality          Plan:   Type II diabetes mellitus with peripheral circulatory disorder  -     DIABETIC SHOES FOR HOME USE    Pre-ulcerative calluses - Left Foot  -     DIABETIC SHOES FOR HOME USE    Pes planus of both feet  -     DIABETIC SHOES FOR HOME USE    HAV (hallux abducto valgus), unspecified laterality  -     DIABETIC SHOES FOR HOME USE      I counseled the patient on her conditions, regarding findings of my examination, my impressions, and usual treatment plan.   Greater than 50% of this visit spent on counseling  and coordination of care.  Greater than 20 minutes spent on education about the diabetic foot, neuropathy, and prevention of limb loss.  Shoe inspection. Diabetic Foot Education. Patient reminded of the importance of good nutrition and blood sugar control to help prevent podiatric complications of diabetes. Patient instructed on proper foot hygeine. We discussed wearing proper shoe gear, daily foot inspections, never walking without protective shoe gear, never putting sharp instruments to feet.    With patient's permission, left foot pre-ulcerative callus trimmed in thickness with #15 blade in thickness without incident. The patient is alerted to watch for any signs of infection (redness, pus, pain, increased swelling or fever) and call if such occurs. Home wound care instructions are provided.  Prescription written for Diabetic shoes and inserts.   Felt offloading adjustments made to left shoe inserts.   Patient  will continue to monitor the areas daily, inspect feet, wear protective shoe gear when ambulatory, moisturizer to maintain skin integrity. Patient reminded of the importance of good nutrition and blood sugar control to help prevent podiatric complications of diabetes.  Patient to return 1 months or sooner if needed.               Viet Muir DPM  [facility]

## 2017-04-27 NOTE — PROGRESS NOTES
Patient, Julia Preston (MRN #[id number]), presented with a recorded BMI of 39.82 kg/m^2 and a documented comorbidity(s):  - Diabetes Mellitus Type 2  to which the severe obesity is a contributing factor. This is consistent with the definition of severe obesity (BMI 35.0-35.9) with comorbidity (ICD-10 E66.01, Z68.35). The patient's severe obesity was monitored, evaluated, addressed and/or treated. This addendum to the medical record is made on 04/26/2017.

## 2017-05-25 ENCOUNTER — OFFICE VISIT (OUTPATIENT)
Dept: PODIATRY | Facility: CLINIC | Age: 57
End: 2017-05-25
Payer: MEDICARE

## 2017-05-25 VITALS
BODY MASS INDEX: 38.48 KG/M2 | WEIGHT: 239.44 LBS | HEART RATE: 68 BPM | DIASTOLIC BLOOD PRESSURE: 95 MMHG | SYSTOLIC BLOOD PRESSURE: 218 MMHG | HEIGHT: 66 IN

## 2017-05-25 DIAGNOSIS — L84 PRE-ULCERATIVE CALLUSES: ICD-10-CM

## 2017-05-25 DIAGNOSIS — E11.51 TYPE II DIABETES MELLITUS WITH PERIPHERAL CIRCULATORY DISORDER: Primary | ICD-10-CM

## 2017-05-25 PROCEDURE — 99999 PR PBB SHADOW E&M-EST. PATIENT-LVL III: CPT | Mod: PBBFAC,,, | Performed by: PODIATRIST

## 2017-05-25 PROCEDURE — 99212 OFFICE O/P EST SF 10 MIN: CPT | Mod: S$GLB,,, | Performed by: PODIATRIST

## 2017-05-25 NOTE — PROGRESS NOTES
Subjective:     Patient ID: Julia Preston is a 56 y.o. female.    Chief Complaint: Follow-up (Left foot pre-ulcerative callous. Area appears to be closed and no drainage. ) and Diabetes Mellitus (Last PCP visit with -unknown. Today's glucose-200 mg/dL.)    Julia is a 56 y.o. female who presents to the clinic for evaluation and treatment of high risk feet. Julia has a past medical history of Diabetes mellitus, type 2; GERD (gastroesophageal reflux disease); Hyperlipidemia; and Hypertension. The patient's chief complaint is callus left foot. Patient states she has had no drainage from foot. Patient states she did get measured for Diabetic shoes. Patient states the pad added to her shoe has helped. Patient states her blood sugar this morning was 200mg/dl. Patient states she has had wound there about a 1 year ago. Patient states callus and area became painful about 2 weeks ago. Patient denies any drainage from the foot. Patient states she wears diabetic tennis shoes in inserts daily. Patient also admits problems with circulation in the past.  This patient has documented high risk feet requiring routine maintenance secondary to diabetes mellitis and those secondary complications of diabetes, as mentioned..    PCP: Idania Currie MD    Date Last Seen by PCP: 4/2017    Current shoe gear:  Affected Foot: Rx diabetic extra depth shoes and custom accommodative insoles     Unaffected Foot: Rx diabetic extra depth shoes and custom accommodative insoles  Patient Active Problem List   Diagnosis    GERD (gastroesophageal reflux disease)    Type II diabetes mellitus with peripheral circulatory disorder    Hyperlipidemia    Hypertension       Medication List with Changes/Refills   Current Medications    CLONIDINE (CATAPRES) 0.1 MG TABLET    Take 0.1 mg by mouth.    CLOPIDOGREL (PLAVIX) 75 MG TABLET    Take 75 mg by mouth once daily.    GABAPENTIN (NEURONTIN) 300 MG CAPSULE    Take 300 mg by mouth 2 (two)  "times daily.    INSULIN ASPART PROTAMINE-INSULIN ASPART (NOVOLOG 70/30) 100 UNIT/ML (70-30) INPN PEN    Inject 40 Units into the skin every morning.    LEVOFLOXACIN (LEVAQUIN) 500 MG TABLET        METOPROLOL TARTRATE (LOPRESSOR) 25 MG TABLET        ONDANSETRON (ZOFRAN-ODT) 4 MG TBDL    Take 1 tablet (4 mg total) by mouth every 8 (eight) hours as needed (nausea).    PANTOPRAZOLE (PROTONIX) 40 MG TABLET    Take 40 mg by mouth once daily.    TRAMADOL (ULTRAM) 50 MG TABLET           Review of patient's allergies indicates:   Allergen Reactions    Lortab [hydrocodone-acetaminophen] Itching       Past Surgical History:   Procedure Laterality Date    CARDIAC SURGERY  2013       Family History   Problem Relation Age of Onset    Hypertension Mother     Diabetes Mother     Hypertension Brother     Diabetes Brother     Seizures Son        Social History     Social History    Marital status:      Spouse name: N/A    Number of children: N/A    Years of education: N/A     Occupational History    Not on file.     Social History Main Topics    Smoking status: Never Smoker    Smokeless tobacco: Not on file    Alcohol use No    Drug use: No    Sexual activity: Yes     Partners: Male     Other Topics Concern    Not on file     Social History Narrative    No narrative on file       Vitals:    05/25/17 1427   BP: (!) 218/95   Pulse: 68   Weight: 108.6 kg (239 lb 6.7 oz)   Height: 5' 6" (1.676 m)   PainSc: 0-No pain       Review of Systems   Constitutional: Negative for chills and fever.   Respiratory: Negative for shortness of breath.    Cardiovascular: Negative for chest pain, palpitations, orthopnea, claudication and leg swelling.   Gastrointestinal: Negative for diarrhea, nausea and vomiting.   Musculoskeletal: Negative for joint pain.   Skin: Negative for rash.   Neurological: Positive for tingling and sensory change. Negative for dizziness, focal weakness and weakness.   Psychiatric/Behavioral: Negative.  "             Objective:      PHYSICAL EXAM: Apperance: Alert and orient in no distress,well developed, and with good attention to grooming and body habits  Patient presents ambulating in diabetic shoes with inserts.   LOWER EXTREMITY EXAM:  VASCULAR: Dorsalis pedis pulses 0/4(monphasic with doppler) bilateral and Posterior Tibial pulses 1/4 bilateral. Capillary fill time <4 seconds bilateral. No edema observed bilateral. Varicosities present bilateral. Skin temperature of the lower extremities is warm to cool, proximal to distal. Hair growth absent bilateral.  DERMATOLOGICAL: No skin rashes, subcutaneous nodules, lesions, or ulcers observed bilateral. Nails 1-5 bilateral thickened, and discolored with subungual debris. Webspaces 1-4 clean, dry and without evidence of break in skin integrity bilateral. Mild hyperkeratotic lesion with dry hemorraghic base noted to left plantar 1st submetatarsal. No open area post debridement. No erythema, drainage, or increased temp noted to area.   NEUROLOGICAL: Light touch, sharp-dull, proprioception all present and equal bilaterally.  Vibratory sensation diminished at bilateral hallux. Protective sensation absent at 4/10 sites as tested with a [place]-Brandy 5.07 monofilament.   MUSCULOSKELETAL: Muscle strength is 5/5 for foot inverters, everters, plantarflexors, and dorsiflexors. Muscle tone is normal.         Assessment:       Encounter Diagnoses   Name Primary?    Type II diabetes mellitus with peripheral circulatory disorder Yes    Pre-ulcerative calluses - Left Foot          Plan:   Type II diabetes mellitus with peripheral circulatory disorder    Pre-ulcerative calluses - Left Foot      I counseled the patient on her conditions, regarding findings of my examination, my impressions, and usual treatment plan.   Shoe inspection. Diabetic Foot Education. Patient reminded of the importance of good nutrition and blood sugar control to help prevent podiatric complications of  diabetes. Patient instructed on proper foot hygeine. We discussed wearing proper shoe gear, daily foot inspections, never walking without protective shoe gear, never putting sharp instruments to feet.    The patient is alerted to watch for any signs of infection (redness, pus, pain, increased swelling or fever) and call if such occurs. Home wound care instructions are provided.  Patient  will continue to monitor the areas daily, inspect feet, wear protective shoe gear when ambulatory, moisturizer to maintain skin integrity. Patient reminded of the importance of good nutrition and blood sugar control to help prevent podiatric complications of diabetes.  Patient to return 2 months or sooner if needed.               Viet Muir DPM  [facility]

## 2017-08-16 ENCOUNTER — OFFICE VISIT (OUTPATIENT)
Dept: PODIATRY | Facility: CLINIC | Age: 57
End: 2017-08-16
Payer: MEDICARE

## 2017-08-16 VITALS
SYSTOLIC BLOOD PRESSURE: 181 MMHG | WEIGHT: 230.19 LBS | DIASTOLIC BLOOD PRESSURE: 83 MMHG | BODY MASS INDEX: 36.99 KG/M2 | HEIGHT: 66 IN | HEART RATE: 81 BPM

## 2017-08-16 DIAGNOSIS — L84 PRE-ULCERATIVE CALLUSES: ICD-10-CM

## 2017-08-16 DIAGNOSIS — E11.51 TYPE II DIABETES MELLITUS WITH PERIPHERAL CIRCULATORY DISORDER: Primary | ICD-10-CM

## 2017-08-16 DIAGNOSIS — B35.1 DERMATOPHYTOSIS OF NAIL: ICD-10-CM

## 2017-08-16 PROCEDURE — 11055 PARING/CUTG B9 HYPRKER LES 1: CPT | Mod: Q8,S$GLB,, | Performed by: PODIATRIST

## 2017-08-16 PROCEDURE — 99999 PR PBB SHADOW E&M-EST. PATIENT-LVL III: CPT | Mod: PBBFAC,,, | Performed by: PODIATRIST

## 2017-08-16 PROCEDURE — 11721 DEBRIDE NAIL 6 OR MORE: CPT | Mod: 59,Q8,S$GLB, | Performed by: PODIATRIST

## 2017-08-16 PROCEDURE — 99499 UNLISTED E&M SERVICE: CPT | Mod: S$GLB,,, | Performed by: PODIATRIST

## 2017-08-16 RX ORDER — ASPIRIN 81 MG/1
81 TABLET ORAL DAILY
COMMUNITY

## 2017-08-16 RX ORDER — CARVEDILOL 25 MG/1
6.25 TABLET ORAL 2 TIMES DAILY WITH MEALS
COMMUNITY
End: 2018-10-11

## 2017-08-16 RX ORDER — AMLODIPINE BESYLATE 10 MG/1
10 TABLET ORAL DAILY
COMMUNITY
End: 2021-03-29

## 2017-08-16 RX ORDER — ATORVASTATIN CALCIUM 80 MG/1
80 TABLET, FILM COATED ORAL DAILY
COMMUNITY

## 2017-08-23 NOTE — PROGRESS NOTES
Subjective:     Patient ID: Julia Preston is a 56 y.o. female.    Chief Complaint: Routine Foot Care (PCP: Idania Currie 8/2017 , diabetic nail care/feet check ) and Foot Problem (left great toe )    Julia is a 56 y.o. female who presents to the clinic for evaluation and treatment of high risk feet. Julia has a past medical history of Diabetes mellitus, type 2; GERD (gastroesophageal reflux disease); High cholesterol; Hyperlipidemia; Hypertension; and Pneumonia (6/2017 or 7/2017 per the patient). The patient's chief complaint is painful callus left foot. Patient states the diabetic shoes were to narrow so she is still waiting on her new pair. Patient denies any open area or drainage from the foot. Patient states she wears her old diabetic tennis shoes and inserts daily. Patient also admits problems with circulation in the past.  This patient has documented high risk feet requiring routine maintenance secondary to diabetes mellitis and those secondary complications of diabetes, as mentioned..    PCP: Idania Currie MD    Date Last Seen by PCP: 8/2017    Current shoe gear:  Affected Foot: Rx diabetic extra depth shoes and custom accommodative insoles     Unaffected Foot: Rx diabetic extra depth shoes and custom accommodative insoles  Patient Active Problem List   Diagnosis    GERD (gastroesophageal reflux disease)    Type II diabetes mellitus with peripheral circulatory disorder    Hyperlipidemia    Hypertension       Medication List with Changes/Refills   Current Medications    AMLODIPINE (NORVASC) 10 MG TABLET    Take 10 mg by mouth once daily.    ASPIRIN (ECOTRIN) 81 MG EC TABLET    Take 81 mg by mouth once daily.    ATORVASTATIN (LIPITOR) 80 MG TABLET    Take 80 mg by mouth once daily.    CARVEDILOL (COREG) 25 MG TABLET    Take 25 mg by mouth 2 (two) times daily with meals.    CLONIDINE (CATAPRES) 0.1 MG TABLET    Take 0.1 mg by mouth.    CLOPIDOGREL (PLAVIX) 75 MG TABLET    Take 75 mg by mouth once  "daily.    GABAPENTIN (NEURONTIN) 300 MG CAPSULE    Take 300 mg by mouth 2 (two) times daily.    INSULIN ASPART PROTAMINE-INSULIN ASPART (NOVOLOG 70/30) 100 UNIT/ML (70-30) INPN PEN    Inject 40 Units into the skin every morning.    ONDANSETRON (ZOFRAN-ODT) 4 MG TBDL    Take 1 tablet (4 mg total) by mouth every 8 (eight) hours as needed (nausea).    PANTOPRAZOLE (PROTONIX) 40 MG TABLET    Take 40 mg by mouth once daily.   Discontinued Medications    LEVOFLOXACIN (LEVAQUIN) 500 MG TABLET        METOPROLOL TARTRATE (LOPRESSOR) 25 MG TABLET        TRAMADOL (ULTRAM) 50 MG TABLET           Review of patient's allergies indicates:   Allergen Reactions    Lortab [hydrocodone-acetaminophen] Itching       Past Surgical History:   Procedure Laterality Date    CARDIAC SURGERY  2013       Family History   Problem Relation Age of Onset    Hypertension Mother     Diabetes Mother     Hypertension Brother     Diabetes Brother     Seizures Son        Social History     Social History    Marital status:      Spouse name: N/A    Number of children: N/A    Years of education: N/A     Occupational History    Not on file.     Social History Main Topics    Smoking status: Never Smoker    Smokeless tobacco: Never Used    Alcohol use No    Drug use: No    Sexual activity: Yes     Partners: Male     Other Topics Concern    Not on file     Social History Narrative    No narrative on file       Vitals:    08/16/17 1339   BP: (!) 181/83   Pulse: 81   Weight: 104.4 kg (230 lb 2.6 oz)   Height: 5' 6" (1.676 m)   PainSc: 0-No pain       Review of Systems   Constitutional: Negative for chills and fever.   Respiratory: Negative for shortness of breath.    Cardiovascular: Negative for chest pain, palpitations, orthopnea, claudication and leg swelling.   Gastrointestinal: Negative for diarrhea, nausea and vomiting.   Musculoskeletal: Negative for joint pain.   Skin: Negative for rash.   Neurological: Positive for tingling and " sensory change. Negative for dizziness, focal weakness and weakness.   Psychiatric/Behavioral: Negative.              Objective:      PHYSICAL EXAM: Apperance: Alert and orient in no distress,well developed, and with good attention to grooming and body habits  Patient presents ambulating in diabetic shoes with inserts.   LOWER EXTREMITY EXAM:  VASCULAR: Dorsalis pedis pulses 0/4(monphasic with doppler) bilateral and Posterior Tibial pulses 1/4 bilateral. Capillary fill time <4 seconds bilateral. No edema observed bilateral. Varicosities present bilateral. Skin temperature of the lower extremities is warm to cool, proximal to distal. Hair growth absent bilateral.  DERMATOLOGICAL: No skin rashes, subcutaneous nodules, lesions, or ulcers observed bilateral. Nails 1,2,3,4,5 bilateral elongated, thickened, and discolored with subungual debris. Webspaces 1-4 clean, dry and without evidence of break in skin integrity bilateral. Mild hyperkeratotic lesion noted to left plantar 1st submetatarsal. No open area post debridement. No erythema, drainage, or increased temp noted to area.   NEUROLOGICAL: Light touch, sharp-dull, proprioception all present and equal bilaterally.  Vibratory sensation diminished at bilateral hallux. Protective sensation absent at 4/10 sites as tested with a [place]-Brandy 5.07 monofilament.   MUSCULOSKELETAL: Muscle strength is 5/5 for foot inverters, everters, plantarflexors, and dorsiflexors. Muscle tone is normal.         Assessment:       Encounter Diagnoses   Name Primary?    Type II diabetes mellitus with peripheral circulatory disorder Yes    Dermatophytosis of nail     Pre-ulcerative calluses - Left Foot          Plan:   Type II diabetes mellitus with peripheral circulatory disorder    Dermatophytosis of nail    Pre-ulcerative calluses - Left Foot      I counseled the patient on her conditions, regarding findings of my examination, my impressions, and usual treatment plan.   Shoe  inspection. Diabetic Foot Education. Patient reminded of the importance of good nutrition and blood sugar control to help prevent podiatric complications of diabetes. Patient instructed on proper foot hygeine. We discussed wearing proper shoe gear, daily foot inspections, never walking without protective shoe gear, never putting sharp instruments to feet.    With patient's permission, nails 1-5 bilateral were trimmed in length and thickness aggressively to their soft tissue attachment mechanically and with electric , removing all offending nail and debris. Patient relates relief following the procedure.  With patient's permission, left foot pre-ulcerative callus trimmed in thickness with #15 blade in thickness without incident. The patient is alerted to watch for any signs of infection (redness, pus, pain, increased swelling or fever) and call if such occurs. Home wound care instructions are provided.  Patient  will continue to monitor the areas daily, inspect feet, wear protective shoe gear when ambulatory, moisturizer to maintain skin integrity. Patient reminded of the importance of good nutrition and blood sugar control to help prevent podiatric complications of diabetes.  Patient to return 3 months or sooner if needed.               Viet Muir DPM  [facility]

## 2017-10-19 ENCOUNTER — OFFICE VISIT (OUTPATIENT)
Dept: PODIATRY | Facility: CLINIC | Age: 57
End: 2017-10-19
Payer: MEDICARE

## 2017-10-19 VITALS
DIASTOLIC BLOOD PRESSURE: 81 MMHG | SYSTOLIC BLOOD PRESSURE: 181 MMHG | HEIGHT: 66 IN | WEIGHT: 230 LBS | BODY MASS INDEX: 36.96 KG/M2 | HEART RATE: 83 BPM

## 2017-10-19 DIAGNOSIS — E11.51 TYPE II DIABETES MELLITUS WITH PERIPHERAL CIRCULATORY DISORDER: Primary | ICD-10-CM

## 2017-10-19 DIAGNOSIS — B35.1 DERMATOPHYTOSIS OF NAIL: ICD-10-CM

## 2017-10-19 DIAGNOSIS — L84 PRE-ULCERATIVE CALLUSES: ICD-10-CM

## 2017-10-19 PROCEDURE — 11055 PARING/CUTG B9 HYPRKER LES 1: CPT | Mod: Q8,S$GLB,, | Performed by: PODIATRIST

## 2017-10-19 PROCEDURE — 11721 DEBRIDE NAIL 6 OR MORE: CPT | Mod: 59,Q8,S$GLB, | Performed by: PODIATRIST

## 2017-10-19 PROCEDURE — 99999 PR PBB SHADOW E&M-EST. PATIENT-LVL III: CPT | Mod: PBBFAC,,, | Performed by: PODIATRIST

## 2017-10-19 PROCEDURE — 99499 UNLISTED E&M SERVICE: CPT | Mod: S$GLB,,, | Performed by: PODIATRIST

## 2017-10-19 NOTE — PROGRESS NOTES
Subjective:     Patient ID: Julia Preston is a 57 y.o. female.    Chief Complaint: Routine Foot Care (PCP: Idania Currie 10/2017 , diabetic nail care/feet check, patient c/o having swollen feet and PCP order blood work to figure out why her feet are swelling )    Julia is a 57 y.o. female who presents to the clinic for evaluation and treatment of high risk feet. Julia has a past medical history of Diabetes mellitus, type 2; GERD (gastroesophageal reflux disease); High cholesterol; Hyperlipidemia; Hypertension; and Pneumonia (6/2017 or 7/2017 per the patient). The patient's chief complaint is nail care. This patient has documented high risk feet requiring routine maintenance secondary to diabetes mellitis and those secondary complications of diabetes, as mentioned..    PCP: Idania Currie MD    Date Last Seen by PCP: 10/2017    Current shoe gear:  Affected Foot: Rx diabetic extra depth shoes and custom accommodative insoles     Unaffected Foot: Rx diabetic extra depth shoes and custom accommodative insoles  Patient Active Problem List   Diagnosis    GERD (gastroesophageal reflux disease)    Type II diabetes mellitus with peripheral circulatory disorder    Hyperlipidemia    Hypertension       Medication List with Changes/Refills   Current Medications    AMLODIPINE (NORVASC) 10 MG TABLET    Take 10 mg by mouth once daily.    ASPIRIN (ECOTRIN) 81 MG EC TABLET    Take 81 mg by mouth once daily.    ATORVASTATIN (LIPITOR) 80 MG TABLET    Take 80 mg by mouth once daily.    CARVEDILOL (COREG) 25 MG TABLET    Take 25 mg by mouth 2 (two) times daily with meals.    CLONIDINE (CATAPRES) 0.1 MG TABLET    Take 0.1 mg by mouth.    CLOPIDOGREL (PLAVIX) 75 MG TABLET    Take 75 mg by mouth once daily.    GABAPENTIN (NEURONTIN) 300 MG CAPSULE    Take 300 mg by mouth 2 (two) times daily.    INSULIN ASPART PROTAMINE-INSULIN ASPART (NOVOLOG 70/30) 100 UNIT/ML (70-30) INPN PEN    Inject 40 Units into the skin every morning.  "   ONDANSETRON (ZOFRAN-ODT) 4 MG TBDL    Take 1 tablet (4 mg total) by mouth every 8 (eight) hours as needed (nausea).    PANTOPRAZOLE (PROTONIX) 40 MG TABLET    Take 40 mg by mouth once daily.       Review of patient's allergies indicates:   Allergen Reactions    Lortab [hydrocodone-acetaminophen] Itching       Past Surgical History:   Procedure Laterality Date    CARDIAC SURGERY  2013       Family History   Problem Relation Age of Onset    Hypertension Mother     Diabetes Mother     Hypertension Brother     Diabetes Brother     Seizures Son        Social History     Social History    Marital status:      Spouse name: N/A    Number of children: N/A    Years of education: N/A     Occupational History    Not on file.     Social History Main Topics    Smoking status: Never Smoker    Smokeless tobacco: Never Used    Alcohol use No    Drug use: No    Sexual activity: Yes     Partners: Male     Other Topics Concern    Not on file     Social History Narrative    No narrative on file       Vitals:    10/19/17 1148 10/19/17 1158   BP: (!) 200/79 (!) 181/81   Pulse: 84 83   Weight: 104.3 kg (230 lb)    Height: 5' 6" (1.676 m)    PainSc: 0-No pain        Review of Systems   Constitutional: Negative for chills and fever.   Respiratory: Negative for shortness of breath.    Cardiovascular: Negative for chest pain, palpitations, orthopnea, claudication and leg swelling.   Gastrointestinal: Negative for diarrhea, nausea and vomiting.   Musculoskeletal: Negative for joint pain.   Skin: Negative for rash.   Neurological: Positive for tingling and sensory change. Negative for dizziness, focal weakness and weakness.   Psychiatric/Behavioral: Negative.            Objective:      PHYSICAL EXAM: Apperance: Alert and orient in no distress,well developed, and with good attention to grooming and body habits  Patient presents ambulating in diabetic shoes with inserts.   LOWER EXTREMITY EXAM:  VASCULAR: Dorsalis pedis " pulses 0/4(monphasic with doppler) bilateral and Posterior Tibial pulses 1/4 bilateral. Capillary fill time <4 seconds bilateral. No edema observed bilateral. Varicosities present bilateral. Skin temperature of the lower extremities is warm to cool, proximal to distal. Hair growth absent bilateral.  DERMATOLOGICAL: No skin rashes, subcutaneous nodules, lesions, or ulcers observed bilateral. Nails 1,2,3,4,5 bilateral elongated, thickened, and discolored with subungual debris. Webspaces 1-4 clean, dry and without evidence of break in skin integrity bilateral. Mild hyperkeratotic lesion noted to left plantar 1st submetatarsal. No open area post debridement. No erythema, drainage, or increased temp noted to area.   NEUROLOGICAL: Light touch, sharp-dull, proprioception all present and equal bilaterally.  Vibratory sensation diminished at bilateral hallux. Protective sensation absent at 4/10 sites as tested with a [place]-Brandy 5.07 monofilament.   MUSCULOSKELETAL: Muscle strength is 5/5 for foot inverters, everters, plantarflexors, and dorsiflexors. Muscle tone is normal.         Assessment:       Encounter Diagnoses   Name Primary?    Type II diabetes mellitus with peripheral circulatory disorder Yes    Dermatophytosis of nail     Pre-ulcerative calluses - Left Foot          Plan:   Type II diabetes mellitus with peripheral circulatory disorder    Dermatophytosis of nail    Pre-ulcerative calluses - Left Foot      I counseled the patient on her conditions, regarding findings of my examination, my impressions, and usual treatment plan.   Shoe inspection. Diabetic Foot Education. Patient reminded of the importance of good nutrition and blood sugar control to help prevent podiatric complications of diabetes. Patient instructed on proper foot hygeine. We discussed wearing proper shoe gear, daily foot inspections, never walking without protective shoe gear, never putting sharp instruments to feet.    With patient's  permission, nails 1-5 bilateral were debrided/trimmed in length and thickness aggressively to their soft tissue attachment mechanically and with electric , removing all offending nail and debris. Patient relates relief following the procedure.  With patient's permission, left foot pre-ulcerative callus trimmed in thickness with #15 blade in thickness without incident. The patient is alerted to watch for any signs of infection (redness, pus, pain, increased swelling or fever) and call if such occurs. Home wound care instructions are provided.  Patient  will continue to monitor the areas daily, inspect feet, wear protective shoe gear when ambulatory, moisturizer to maintain skin integrity. Patient reminded of the importance of good nutrition and blood sugar control to help prevent podiatric complications of diabetes.  Patient to return 3 months or sooner if needed.               Viet Muir DPM  [facility]

## 2018-01-11 ENCOUNTER — OFFICE VISIT (OUTPATIENT)
Dept: PODIATRY | Facility: CLINIC | Age: 58
End: 2018-01-11
Payer: MEDICARE

## 2018-01-11 VITALS
HEART RATE: 70 BPM | SYSTOLIC BLOOD PRESSURE: 234 MMHG | DIASTOLIC BLOOD PRESSURE: 90 MMHG | WEIGHT: 233.44 LBS | HEIGHT: 66 IN | BODY MASS INDEX: 37.52 KG/M2

## 2018-01-11 DIAGNOSIS — B35.1 DERMATOPHYTOSIS OF NAIL: ICD-10-CM

## 2018-01-11 DIAGNOSIS — E11.51 TYPE II DIABETES MELLITUS WITH PERIPHERAL CIRCULATORY DISORDER: Primary | ICD-10-CM

## 2018-01-11 DIAGNOSIS — L84 PRE-ULCERATIVE CALLUSES: ICD-10-CM

## 2018-01-11 PROCEDURE — 99999 PR PBB SHADOW E&M-EST. PATIENT-LVL III: CPT | Mod: PBBFAC,,, | Performed by: PODIATRIST

## 2018-01-11 PROCEDURE — 99499 UNLISTED E&M SERVICE: CPT | Mod: S$GLB,,, | Performed by: PODIATRIST

## 2018-01-11 PROCEDURE — 11721 DEBRIDE NAIL 6 OR MORE: CPT | Mod: 59,Q8,S$GLB, | Performed by: PODIATRIST

## 2018-01-11 PROCEDURE — 11055 PARING/CUTG B9 HYPRKER LES 1: CPT | Mod: Q8,S$GLB,, | Performed by: PODIATRIST

## 2018-01-11 RX ORDER — HYDRALAZINE HYDROCHLORIDE 50 MG/1
50 TABLET, FILM COATED ORAL 3 TIMES DAILY
COMMUNITY
End: 2018-10-11

## 2018-01-11 NOTE — PROGRESS NOTES
Subjective:     Patient ID: Julia Preston is a 57 y.o. female.    Chief Complaint: Nail Care (Patient states the bottom of her feet ache a lot at night. States no current pain.) and Diabetes Mellitus (Last PCP visit with - November 2017. )    Julia is a 57 y.o. female who presents to the clinic for evaluation and treatment of high risk feet. Julia has a past medical history of Diabetes mellitus, type 2; GERD (gastroesophageal reflux disease); High cholesterol; Hyperlipidemia; Hypertension; and Pneumonia (6/2017 or 7/2017 per the patient). The patient's chief complaint is nail care. This patient has documented high risk feet requiring routine maintenance secondary to diabetes mellitis and those secondary complications of diabetes, as mentioned..    PCP: Idania Currie MD    Date Last Seen by PCP: 11/2017    Current shoe gear:  Affected Foot: Rx diabetic extra depth shoes and custom accommodative insoles     Unaffected Foot: Rx diabetic extra depth shoes and custom accommodative insoles  Patient Active Problem List   Diagnosis    GERD (gastroesophageal reflux disease)    Type II diabetes mellitus with peripheral circulatory disorder    Hyperlipidemia    Hypertension       Medication List with Changes/Refills   Current Medications    AMLODIPINE (NORVASC) 10 MG TABLET    Take 10 mg by mouth once daily.    ASPIRIN (ECOTRIN) 81 MG EC TABLET    Take 81 mg by mouth once daily.    ATORVASTATIN (LIPITOR) 80 MG TABLET    Take 80 mg by mouth once daily.    CARVEDILOL (COREG) 25 MG TABLET    Take 6.25 mg by mouth 2 (two) times daily with meals.     CLONIDINE (CATAPRES) 0.1 MG TABLET    Take 0.1 mg by mouth.    CLOPIDOGREL (PLAVIX) 75 MG TABLET    Take 75 mg by mouth once daily.    GABAPENTIN (NEURONTIN) 300 MG CAPSULE    Take 300 mg by mouth 2 (two) times daily.    HYDRALAZINE (APRESOLINE) 50 MG TABLET    Take 50 mg by mouth 3 (three) times daily.    INSULIN ASPART PROTAMINE-INSULIN ASPART (NOVOLOG  "70/30) 100 UNIT/ML (70-30) INPN PEN    Inject 40 Units into the skin every morning.    ONDANSETRON (ZOFRAN-ODT) 4 MG TBDL    Take 1 tablet (4 mg total) by mouth every 8 (eight) hours as needed (nausea).    PANTOPRAZOLE (PROTONIX) 40 MG TABLET    Take 40 mg by mouth once daily.       Review of patient's allergies indicates:   Allergen Reactions    Lortab [hydrocodone-acetaminophen] Itching       Past Surgical History:   Procedure Laterality Date    CARDIAC SURGERY  2013       Family History   Problem Relation Age of Onset    Hypertension Mother     Diabetes Mother     Hypertension Brother     Diabetes Brother     Seizures Son        Social History     Social History    Marital status:      Spouse name: N/A    Number of children: N/A    Years of education: N/A     Occupational History    Not on file.     Social History Main Topics    Smoking status: Never Smoker    Smokeless tobacco: Never Used    Alcohol use No    Drug use: No    Sexual activity: Yes     Partners: Male     Other Topics Concern    Not on file     Social History Narrative    No narrative on file       Vitals:    01/11/18 1040   BP: (!) 234/90   Pulse: 70   Weight: 105.9 kg (233 lb 7.5 oz)   Height: 5' 6" (1.676 m)   PainSc: 0-No pain       Review of Systems   Constitutional: Negative for chills and fever.   Respiratory: Negative for shortness of breath.    Cardiovascular: Negative for chest pain, palpitations, orthopnea, claudication and leg swelling.   Gastrointestinal: Negative for diarrhea, nausea and vomiting.   Musculoskeletal: Negative for joint pain.   Skin: Negative for rash.   Neurological: Positive for tingling and sensory change. Negative for dizziness, focal weakness and weakness.   Psychiatric/Behavioral: Negative.            Objective:      PHYSICAL EXAM: Apperance: Alert and orient in no distress,well developed, and with good attention to grooming and body habits  Patient presents ambulating in diabetic shoes " with inserts.   LOWER EXTREMITY EXAM:  VASCULAR: Dorsalis pedis pulses 0/4(monphasic with doppler) bilateral and Posterior Tibial pulses 1/4 bilateral. Capillary fill time <4 seconds bilateral. No edema observed bilateral. Varicosities present bilateral. Skin temperature of the lower extremities is warm to cool, proximal to distal. Hair growth absent bilateral.  DERMATOLOGICAL: No skin rashes, subcutaneous nodules, lesions, or ulcers observed bilateral. Nails 1,2,3,4,5 bilateral elongated, thickened, and discolored with subungual debris. Webspaces 1-4 clean, dry and without evidence of break in skin integrity bilateral. Mild hyperkeratotic lesion noted to left plantar 1st submetatarsal. No open area post debridement. No erythema, drainage, or increased temp noted to area.   NEUROLOGICAL: Light touch, sharp-dull, proprioception all present and equal bilaterally.  Vibratory sensation diminished at bilateral hallux. Protective sensation absent at 4/10 sites as tested with a [place]-Brandy 5.07 monofilament.   MUSCULOSKELETAL: Muscle strength is 5/5 for foot inverters, everters, plantarflexors, and dorsiflexors. Muscle tone is normal.         Assessment:       Encounter Diagnoses   Name Primary?    Type II diabetes mellitus with peripheral circulatory disorder Yes    Dermatophytosis of nail     Pre-ulcerative calluses - Left Foot          Plan:   Type II diabetes mellitus with peripheral circulatory disorder    Dermatophytosis of nail    Pre-ulcerative calluses - Left Foot      I counseled the patient on her conditions, regarding findings of my examination, my impressions, and usual treatment plan.   Shoe inspection. Diabetic Foot Education. Patient reminded of the importance of good nutrition and blood sugar control to help prevent podiatric complications of diabetes. Patient instructed on proper foot hygeine. We discussed wearing proper shoe gear, daily foot inspections, never walking without protective shoe  gear, never putting sharp instruments to feet.    With patient's permission, nails 1-5 bilateral were debrided/trimmed in length and thickness aggressively to their soft tissue attachment mechanically and with electric , removing all offending nail and debris. Patient relates relief following the procedure.  With patient's permission, left foot pre-ulcerative callus trimmed in thickness with #15 blade in thickness without incident. The patient is alerted to watch for any signs of infection (redness, pus, pain, increased swelling or fever) and call if such occurs. Home wound care instructions are provided.  Patient  will continue to monitor the areas daily, inspect feet, wear protective shoe gear when ambulatory, moisturizer to maintain skin integrity. Patient reminded of the importance of good nutrition and blood sugar control to help prevent podiatric complications of diabetes.  Patient to return 3 months or sooner if needed.               Viet Muir DPM  [facility]

## 2018-01-11 NOTE — PROGRESS NOTES
Patient, Julia Preston (MRN #[id number]), presented with a recorded BMI of 37.68 kg/m^2 and a documented comorbidity(s):  - Diabetes Mellitus Type 2  to which the severe obesity is a contributing factor. This is consistent with the definition of severe obesity (BMI 35.0-35.9) with comorbidity (ICD-10 E66.01, Z68.35). The patient's severe obesity was monitored, evaluated, addressed and/or treated. This addendum to the medical record is made on 01/11/2018.

## 2018-04-06 ENCOUNTER — OFFICE VISIT (OUTPATIENT)
Dept: PODIATRY | Facility: CLINIC | Age: 58
End: 2018-04-06
Payer: MEDICARE

## 2018-04-06 VITALS
HEIGHT: 66 IN | DIASTOLIC BLOOD PRESSURE: 60 MMHG | HEART RATE: 67 BPM | WEIGHT: 239.63 LBS | BODY MASS INDEX: 38.51 KG/M2 | SYSTOLIC BLOOD PRESSURE: 135 MMHG

## 2018-04-06 DIAGNOSIS — E11.51 TYPE II DIABETES MELLITUS WITH PERIPHERAL CIRCULATORY DISORDER: ICD-10-CM

## 2018-04-06 DIAGNOSIS — L97.512 NEUROPATHIC ULCER OF RIGHT FOOT WITH FAT LAYER EXPOSED: Primary | ICD-10-CM

## 2018-04-06 DIAGNOSIS — E11.9 COMPREHENSIVE DIABETIC FOOT EXAMINATION, TYPE 2 DM, ENCOUNTER FOR: ICD-10-CM

## 2018-04-06 DIAGNOSIS — B35.1 DERMATOPHYTOSIS OF NAIL: ICD-10-CM

## 2018-04-06 DIAGNOSIS — L84 PRE-ULCERATIVE CALLUSES: ICD-10-CM

## 2018-04-06 PROCEDURE — 11042 DBRDMT SUBQ TIS 1ST 20SQCM/<: CPT | Mod: 59,RT,S$GLB, | Performed by: PODIATRIST

## 2018-04-06 PROCEDURE — 99214 OFFICE O/P EST MOD 30 MIN: CPT | Mod: 25,S$GLB,, | Performed by: PODIATRIST

## 2018-04-06 PROCEDURE — 99999 PR PBB SHADOW E&M-EST. PATIENT-LVL III: CPT | Mod: PBBFAC,,, | Performed by: PODIATRIST

## 2018-04-06 PROCEDURE — 11721 DEBRIDE NAIL 6 OR MORE: CPT | Mod: 59,Q8,S$GLB, | Performed by: PODIATRIST

## 2018-04-06 PROCEDURE — 11055 PARING/CUTG B9 HYPRKER LES 1: CPT | Mod: Q8,S$GLB,, | Performed by: PODIATRIST

## 2018-04-06 RX ORDER — FUROSEMIDE 20 MG/1
TABLET ORAL
COMMUNITY
Start: 2018-03-16

## 2018-04-06 NOTE — PROGRESS NOTES
Subjective:     Patient ID: Julia Preston is a 57 y.o. female.    Chief Complaint: Routine Foot Care (PCP: Idania Currie 2018 , diabetic nail care/feet check ) and Foot Problem (right foot (underneath), possible callus that has turned black)    Julia is a 57 y.o. female who presents to the clinic for evaluation and treatment of high risk feet. Julia has a past medical history of Congestive heart failure; Diabetes mellitus, type 2; GERD (gastroesophageal reflux disease); High cholesterol; Hyperlipidemia; Hypertension; Pneumonia (2017 or 2017 per the patient); and Pneumonia (2018). The patient's chief complaint is nail care.Patient states she wore some shoes to a  last week and thinks it caused a blister on her right foot. Patient denies any drainage from the foot.  This patient has documented high risk feet requiring routine maintenance secondary to diabetes mellitis and those secondary complications of diabetes, as mentioned..    PCP: Idania Currie MD    Date Last Seen by PCP:     Current shoe gear:  Affected Foot: Rx diabetic extra depth shoes and custom accommodative insoles     Unaffected Foot: Rx diabetic extra depth shoes and custom accommodative insoles     Patient Active Problem List   Diagnosis    GERD (gastroesophageal reflux disease)    Type II diabetes mellitus with peripheral circulatory disorder    Hyperlipidemia    Hypertension       Medication List with Changes/Refills   Current Medications    AMLODIPINE (NORVASC) 10 MG TABLET    Take 10 mg by mouth once daily.    ASPIRIN (ECOTRIN) 81 MG EC TABLET    Take 81 mg by mouth once daily.    ATORVASTATIN (LIPITOR) 80 MG TABLET    Take 80 mg by mouth once daily.    CARVEDILOL (COREG) 25 MG TABLET    Take 6.25 mg by mouth 2 (two) times daily with meals.     CLONIDINE (CATAPRES) 0.1 MG TABLET    Take 0.1 mg by mouth.    CLOPIDOGREL (PLAVIX) 75 MG TABLET    Take 75 mg by mouth once daily.    FUROSEMIDE (LASIX) 20 MG TABLET   "      GABAPENTIN (NEURONTIN) 300 MG CAPSULE    Take 300 mg by mouth 2 (two) times daily.    HYDRALAZINE (APRESOLINE) 50 MG TABLET    Take 50 mg by mouth 3 (three) times daily.    INSULIN ASPART PROTAMINE-INSULIN ASPART (NOVOLOG 70/30) 100 UNIT/ML (70-30) INPN PEN    Inject 40 Units into the skin every morning.    NIFEDIPINE (PROCARDIA XL) 60 MG (OSM) 24 HR TABLET    Take 60 mg by mouth once daily.    ONDANSETRON (ZOFRAN-ODT) 4 MG TBDL    Take 1 tablet (4 mg total) by mouth every 8 (eight) hours as needed (nausea).    PANTOPRAZOLE (PROTONIX) 40 MG TABLET    Take 40 mg by mouth once daily.       Review of patient's allergies indicates:   Allergen Reactions    Lortab [hydrocodone-acetaminophen] Itching       Past Surgical History:   Procedure Laterality Date    CARDIAC SURGERY  2013       Family History   Problem Relation Age of Onset    Hypertension Mother     Diabetes Mother     Hypertension Brother     Diabetes Brother     Seizures Son        Social History     Social History    Marital status:      Spouse name: N/A    Number of children: N/A    Years of education: N/A     Occupational History    Not on file.     Social History Main Topics    Smoking status: Never Smoker    Smokeless tobacco: Never Used    Alcohol use No    Drug use: No    Sexual activity: Yes     Partners: Male     Other Topics Concern    Not on file     Social History Narrative    No narrative on file       Vitals:    04/06/18 0852   BP: 135/60   Pulse: 67   Weight: 108.7 kg (239 lb 10.2 oz)   Height: 5' 6" (1.676 m)   PainSc: 0-No pain       Review of Systems   Constitutional: Negative for chills and fever.   Respiratory: Negative for shortness of breath.    Cardiovascular: Negative for chest pain, palpitations, orthopnea, claudication and leg swelling.   Gastrointestinal: Negative for diarrhea, nausea and vomiting.   Musculoskeletal: Negative for joint pain.   Skin: Negative for rash.   Neurological: Positive for " tingling and sensory change. Negative for dizziness, focal weakness and weakness.   Psychiatric/Behavioral: Negative.            Objective:      PHYSICAL EXAM: Apperance: Alert and orient in no distress,well developed, and with good attention to grooming and body habits  Patient presents ambulating in diabetic shoes with inserts.   LOWER EXTREMITY EXAM:  VASCULAR: Dorsalis pedis pulses 0/4(monphasic with doppler) bilateral and Posterior Tibial pulses 1/4 bilateral. Capillary fill time <4 seconds bilateral. No edema observed bilateral. Varicosities present bilateral. Skin temperature of the lower extremities is warm to cool, proximal to distal. Hair growth absent bilateral. (-) cellulits noted to right foot  DERMATOLOGICAL:  (--) edema, (--) erythema, (--) malodor, (--)  drainage, (--) warmth to right foot.  Ulcer noted to right 1st planar submetatarsal with macerated base and with a 1 mm yellow/white border and hyperkeratosis surrounding ulcer. Ulcer measurement  3cm x 2cm.  The ulcer does not extend into deeper tissue and (--) sinus tracts exist.  The dorsum surface of the feet are soft and supple.  The plantar aspects of both feet are dry and scaly. Nails 1,2,3,4,5 bilateral elongated, thickened, and discolored with subungual debris. Webspaces 1-4 clean, dry and without evidence of break in skin integrity bilateral. Mild hyperkeratotic lesion noted to left plantar 1st submetatarsal. No open area post debridement. No erythema, drainage, or increased temp noted to area.   NEUROLOGICAL: Light touch, sharp-dull, proprioception all present and equal bilaterally.  Vibratory sensation diminished at bilateral hallux. Protective sensation absent at 4/10 sites as tested with a [place]-Brandy 5.07 monofilament.   MUSCULOSKELETAL: Muscle strength is 5/5 for foot inverters, everters, plantarflexors, and dorsiflexors. Muscle tone is normal.                   Assessment:       Encounter Diagnoses   Name Primary?     Neuropathic ulcer of right foot with fat layer exposed Yes    Type II diabetes mellitus with peripheral circulatory disorder     Dermatophytosis of nail     Pre-ulcerative calluses - Left Foot     Comprehensive diabetic foot examination, type 2 DM, encounter for          Plan:   Neuropathic ulcer of right foot with fat layer exposed    Type II diabetes mellitus with peripheral circulatory disorder    Dermatophytosis of nail    Pre-ulcerative calluses - Left Foot    Comprehensive diabetic foot examination, type 2 DM, encounter for      I counseled the patient on her conditions, regarding findings of my examination, my impressions, and usual treatment plan.   Greater than 50% of this visit spent on counseling and coordination of care.  Greater than 15 minutes of a 20 minute appointment spent on education about the diabetic foot, neuropathy, and prevention of limb loss.  Shoe inspection. Diabetic Foot Education. Patient reminded of the importance of good nutrition and blood sugar control to help prevent podiatric complications of diabetes. Patient instructed on proper foot hygeine. We discussed wearing proper shoe gear, daily foot inspections, never walking without protective shoe gear, never putting sharp instruments to feet.    With patient's permission, nails 1-5 bilateral were debrided/trimmed in length and thickness aggressively to their soft tissue attachment mechanically and with electric , removing all offending nail and debris. Patient relates relief following the procedure.  With patient's permission, left foot pre-ulcerative callus trimmed in thickness with #15 blade in thickness without incident. The patient is alerted to watch for any signs of infection (redness, pus, pain, increased swelling or fever) and call if such occurs. Home wound care instructions are provided.  With patient's permission, the right foot wound was sharply excisionally debrided of viable and nonviable tissue from the wound  periphery and base. I redefined the wound borders in the process. Debridement was carried into and did include the subcutaneous layer down to good bleeding granular tissue base utilizing sterile #15 blade and tissue nipper and forceps. Wound was irrigated with sterile saline and bleeding was controlled with direct pressure. Minimal blood loss.  Post debridement measurements are contained in the above progress note. The patient tolerated this well. Wound was then dressed with Hydrofera Blue and a Football wrap. Patient was given instructions on daily dressing changes. Patient was also instructed on the importance of keeping the wound and dressings clean dry and intact and keeping pressure off the wound area until complete healing of the wound.The patient is alerted to watch for any signs of infection (redness, pus, pain, increased swelling or fever) and call if such occurs.   Dispensed surgical shoe to be worn daily when ambulating.   Patient  will continue to monitor the areas daily, inspect feet, wear protective shoe gear when ambulatory, moisturizer to maintain skin integrity. Patient reminded of the importance of good nutrition and blood sugar control to help prevent podiatric complications of diabetes.  Patient to return 1 week or sooner if needed.               Viet Muir DPM  [facility]

## 2018-04-13 ENCOUNTER — OFFICE VISIT (OUTPATIENT)
Dept: PODIATRY | Facility: CLINIC | Age: 58
End: 2018-04-13
Payer: MEDICARE

## 2018-04-13 VITALS
BODY MASS INDEX: 38.51 KG/M2 | HEIGHT: 66 IN | DIASTOLIC BLOOD PRESSURE: 62 MMHG | WEIGHT: 239.63 LBS | SYSTOLIC BLOOD PRESSURE: 141 MMHG | HEART RATE: 65 BPM | RESPIRATION RATE: 20 BRPM

## 2018-04-13 DIAGNOSIS — L84 PRE-ULCERATIVE CALLUSES: ICD-10-CM

## 2018-04-13 DIAGNOSIS — M21.41 PES PLANUS OF BOTH FEET: ICD-10-CM

## 2018-04-13 DIAGNOSIS — M21.42 PES PLANUS OF BOTH FEET: ICD-10-CM

## 2018-04-13 DIAGNOSIS — M20.10 HAV (HALLUX ABDUCTO VALGUS), UNSPECIFIED LATERALITY: ICD-10-CM

## 2018-04-13 DIAGNOSIS — E11.51 TYPE II DIABETES MELLITUS WITH PERIPHERAL CIRCULATORY DISORDER: Primary | ICD-10-CM

## 2018-04-13 PROCEDURE — 99999 PR PBB SHADOW E&M-EST. PATIENT-LVL III: CPT | Mod: PBBFAC,,, | Performed by: PODIATRIST

## 2018-04-13 PROCEDURE — 99212 OFFICE O/P EST SF 10 MIN: CPT | Mod: S$GLB,,, | Performed by: PODIATRIST

## 2018-04-13 RX ORDER — NIFEDIPINE 60 MG/1
60 TABLET, EXTENDED RELEASE ORAL DAILY
COMMUNITY

## 2018-04-13 NOTE — PATIENT INSTRUCTIONS
[facility]    [address] [place] [address]  Phone: [phone]  Fax: [phone]      [facility]  [address] [place] [address]   Phone: [phone]  Fax: [phone] [address] [place] [address] [phone]

## 2018-04-23 NOTE — PROGRESS NOTES
Subjective:     Patient ID: Julia Preston is a 57 y.o. female.    Chief Complaint: Foot Problem (right foot wound check, last PCP visit was 2/2018 with Dr. Currie)    Julia is a 57 y.o. female who presents to the clinic for evaluation and treatment of high risk feet. Julia has a past medical history of Congestive heart failure; Diabetes mellitus, type 2; GERD (gastroesophageal reflux disease); High cholesterol; Hyperlipidemia; Hypertension; Pneumonia (6/2017 or 7/2017 per the patient); and Pneumonia (03/2018). The patient's chief complaint is right foot ulcer. Patient states she kept dressing dry, clean, and intact. Patient also states she wore the wedge shoe as instructed.  This patient has documented high risk feet requiring routine maintenance secondary to diabetes mellitis and those secondary complications of diabetes, as mentioned..    PCP: Idania Currie MD    Date Last Seen by PCP: 1/7/018    Current shoe gear:  Affected Foot: Rx diabetic extra depth shoes and custom accommodative insoles     Unaffected Foot: Rx diabetic extra depth shoes and custom accommodative insoles     Patient Active Problem List   Diagnosis    GERD (gastroesophageal reflux disease)    Type II diabetes mellitus with peripheral circulatory disorder    Hyperlipidemia    Hypertension       Medication List with Changes/Refills   Current Medications    AMLODIPINE (NORVASC) 10 MG TABLET    Take 10 mg by mouth once daily.    ASPIRIN (ECOTRIN) 81 MG EC TABLET    Take 81 mg by mouth once daily.    ATORVASTATIN (LIPITOR) 80 MG TABLET    Take 80 mg by mouth once daily.    CARVEDILOL (COREG) 25 MG TABLET    Take 6.25 mg by mouth 2 (two) times daily with meals.     CLONIDINE (CATAPRES) 0.1 MG TABLET    Take 0.1 mg by mouth.    CLOPIDOGREL (PLAVIX) 75 MG TABLET    Take 75 mg by mouth once daily.    FUROSEMIDE (LASIX) 20 MG TABLET        GABAPENTIN (NEURONTIN) 300 MG CAPSULE    Take 300 mg by mouth 2 (two) times daily.    HYDRALAZINE  "(APRESOLINE) 50 MG TABLET    Take 50 mg by mouth 3 (three) times daily.    INSULIN ASPART PROTAMINE-INSULIN ASPART (NOVOLOG 70/30) 100 UNIT/ML (70-30) INPN PEN    Inject 40 Units into the skin every morning.    NIFEDIPINE (PROCARDIA XL) 60 MG (OSM) 24 HR TABLET    Take 60 mg by mouth once daily.    ONDANSETRON (ZOFRAN-ODT) 4 MG TBDL    Take 1 tablet (4 mg total) by mouth every 8 (eight) hours as needed (nausea).    PANTOPRAZOLE (PROTONIX) 40 MG TABLET    Take 40 mg by mouth once daily.       Review of patient's allergies indicates:   Allergen Reactions    Lortab [hydrocodone-acetaminophen] Itching       Past Surgical History:   Procedure Laterality Date    CARDIAC SURGERY  2013       Family History   Problem Relation Age of Onset    Hypertension Mother     Diabetes Mother     Hypertension Brother     Diabetes Brother     Seizures Son        Social History     Social History    Marital status:      Spouse name: N/A    Number of children: N/A    Years of education: N/A     Occupational History    Not on file.     Social History Main Topics    Smoking status: Never Smoker    Smokeless tobacco: Never Used    Alcohol use No    Drug use: No    Sexual activity: Yes     Partners: Male     Other Topics Concern    Not on file     Social History Narrative    No narrative on file       Vitals:    04/13/18 1009   BP: (!) 141/62   Pulse: 65   Resp: 20   Weight: 108.7 kg (239 lb 10.2 oz)   Height: 5' 6" (1.676 m)   PainSc: 0-No pain       Review of Systems   Constitutional: Negative for chills and fever.   Respiratory: Negative for shortness of breath.    Cardiovascular: Negative for chest pain, palpitations, orthopnea, claudication and leg swelling.   Gastrointestinal: Negative for diarrhea, nausea and vomiting.   Musculoskeletal: Negative for joint pain.   Skin: Negative for rash.   Neurological: Positive for tingling and sensory change. Negative for dizziness, focal weakness and weakness. "   Psychiatric/Behavioral: Negative.            Objective:      PHYSICAL EXAM: Apperance: Alert and orient in no distress,well developed, and with good attention to grooming and body habits  Patient presents ambulating in diabetic shoes with inserts.   LOWER EXTREMITY EXAM:  VASCULAR: Dorsalis pedis pulses 0/4(monphasic with doppler) bilateral and Posterior Tibial pulses 1/4 bilateral. Capillary fill time <4 seconds bilateral. No edema observed bilateral. Varicosities present bilateral. Skin temperature of the lower extremities is warm to cool, proximal to distal. Hair growth absent bilateral. (-) cellulits noted to right foot  DERMATOLOGICAL:  (--) edema, (--) erythema, (--) malodor, (--)  drainage, (--) warmth to right foot. Previous ulcer noted to right 1st planar submetatarsal closed with pink epithealized tissue.  The dorsum surface of the feet are soft and supple.  The plantar aspects of both feet are dry and scaly. Nails 1,2,3,4,5 bilateral elongated, thickened, and discolored with subungual debris. Webspaces 1-4 clean, dry and without evidence of break in skin integrity bilateral. Mild hyperkeratotic lesion noted to left plantar 1st submetatarsal. No open area post debridement. No erythema, drainage, or increased temp noted to area.   NEUROLOGICAL: Light touch, sharp-dull, proprioception all present and equal bilaterally.  Vibratory sensation diminished at bilateral hallux. Protective sensation absent at 4/10 sites as tested with a [place]-Brandy 5.07 monofilament.   MUSCULOSKELETAL: Muscle strength is 5/5 for foot inverters, everters, plantarflexors, and dorsiflexors. Muscle tone is normal.           Assessment:       Encounter Diagnoses   Name Primary?    Type II diabetes mellitus with peripheral circulatory disorder Yes    Pre-ulcerative calluses - Left Foot     Pes planus of both feet     Hav (hallux abducto valgus), unspecified laterality          Plan:   Type II diabetes mellitus with peripheral  circulatory disorder  -     DIABETIC SHOES FOR HOME USE    Pre-ulcerative calluses - Left Foot  -     DIABETIC SHOES FOR HOME USE    Pes planus of both feet  -     DIABETIC SHOES FOR HOME USE    Hav (hallux abducto valgus), unspecified laterality  -     DIABETIC SHOES FOR HOME USE      I counseled the patient on her conditions, regarding findings of my examination, my impressions, and usual treatment plan.   Greater than 15 minutes of a 20 minute appointment spent on education about the diabetic foot, neuropathy, and prevention of limb loss.  Shoe inspection. Diabetic Foot Education. Patient reminded of the importance of good nutrition and blood sugar control to help prevent podiatric complications of diabetes. Patient instructed on proper foot hygeine. We discussed wearing proper shoe gear, daily foot inspections, never walking without protective shoe gear, never putting sharp instruments to feet.    Prescription written for Diabetic shoes and inserts.   Wound has healed well with no signs of continued skin breakdown noted  Ok to convert to normal shoe gear   Patient  will continue to monitor the areas daily, inspect feet, wear protective shoe gear when ambulatory, moisturizer to maintain skin integrity. Patient reminded of the importance of good nutrition and blood sugar control to help prevent podiatric complications of diabetes.  Patient to return 1 month or sooner if needed.               Viet Muir DPM  [facility]    16

## 2018-05-17 ENCOUNTER — OFFICE VISIT (OUTPATIENT)
Dept: PODIATRY | Facility: CLINIC | Age: 58
End: 2018-05-17
Payer: MEDICARE

## 2018-05-17 VITALS
HEIGHT: 66 IN | BODY MASS INDEX: 37.49 KG/M2 | SYSTOLIC BLOOD PRESSURE: 135 MMHG | HEART RATE: 60 BPM | DIASTOLIC BLOOD PRESSURE: 60 MMHG | WEIGHT: 233.25 LBS

## 2018-05-17 DIAGNOSIS — L84 PRE-ULCERATIVE CALLUSES: ICD-10-CM

## 2018-05-17 DIAGNOSIS — E11.51 TYPE II DIABETES MELLITUS WITH PERIPHERAL CIRCULATORY DISORDER: ICD-10-CM

## 2018-05-17 DIAGNOSIS — T14.8XXA SKIN ABRASION: Primary | ICD-10-CM

## 2018-05-17 PROCEDURE — 3008F BODY MASS INDEX DOCD: CPT | Mod: CPTII,S$GLB,, | Performed by: PODIATRIST

## 2018-05-17 PROCEDURE — 99999 PR PBB SHADOW E&M-EST. PATIENT-LVL III: CPT | Mod: PBBFAC,,, | Performed by: PODIATRIST

## 2018-05-17 PROCEDURE — 99212 OFFICE O/P EST SF 10 MIN: CPT | Mod: S$GLB,,, | Performed by: PODIATRIST

## 2018-05-17 RX ORDER — NITROGLYCERIN 0.4 MG/1
0.4 TABLET SUBLINGUAL EVERY 5 MIN PRN
COMMUNITY

## 2018-05-17 RX ORDER — CEPHALEXIN 500 MG/1
CAPSULE ORAL
COMMUNITY
Start: 2018-05-15 | End: 2018-10-11

## 2018-05-17 NOTE — PROGRESS NOTES
Subjective:     Patient ID: Julia Preston is a 57 y.o. female.    Chief Complaint: Wound Check (diabetic patient, right foot )    Julia is a 57 y.o. female who presents to the clinic for evaluation and treatment of high risk feet. Julia has a past medical history of Congestive heart failure; Diabetes mellitus, type 2; GERD (gastroesophageal reflux disease); High cholesterol; Hyperlipidemia; Hypertension; Pneumonia (6/2017 or 7/2017 per the patient); and Pneumonia (03/2018). The patient's chief complaint is right foot check and right 5th toe pain. Patient denies any drainage or break in the area where the ulcer was. Patient states the baby toe is painful mostly at night after she takes off her shoes.  This patient has documented high risk feet requiring routine maintenance secondary to diabetes mellitis and those secondary complications of diabetes, as mentioned..    PCP: Idania Currie MD    Date Last Seen by PCP: 1/7/018    Current shoe gear:  Affected Foot: Rx diabetic extra depth shoes and custom accommodative insoles     Unaffected Foot: Rx diabetic extra depth shoes and custom accommodative insoles     Patient Active Problem List   Diagnosis    GERD (gastroesophageal reflux disease)    Type II diabetes mellitus with peripheral circulatory disorder    Hyperlipidemia    Hypertension       Medication List with Changes/Refills   Current Medications    AMLODIPINE (NORVASC) 10 MG TABLET    Take 10 mg by mouth once daily.    ASPIRIN (ECOTRIN) 81 MG EC TABLET    Take 81 mg by mouth once daily.    ATORVASTATIN (LIPITOR) 80 MG TABLET    Take 80 mg by mouth once daily.    CARVEDILOL (COREG) 25 MG TABLET    Take 6.25 mg by mouth 2 (two) times daily with meals.     CEPHALEXIN (KEFLEX) 500 MG CAPSULE        CLONIDINE (CATAPRES) 0.1 MG TABLET    Take 0.1 mg by mouth.    CLOPIDOGREL (PLAVIX) 75 MG TABLET    Take 75 mg by mouth once daily.    ESOMEPRAZOLE MAGNESIUM (NEXIUM ORAL)    Take by mouth.    FUROSEMIDE  "(LASIX) 20 MG TABLET        GABAPENTIN (NEURONTIN) 300 MG CAPSULE    Take 300 mg by mouth 2 (two) times daily.    HYDRALAZINE (APRESOLINE) 50 MG TABLET    Take 50 mg by mouth 3 (three) times daily.    INSULIN ASPART PROTAMINE-INSULIN ASPART (NOVOLOG 70/30) 100 UNIT/ML (70-30) INPN PEN    Inject 40 Units into the skin every morning.    NIFEDIPINE (PROCARDIA XL) 60 MG (OSM) 24 HR TABLET    Take 60 mg by mouth once daily.    NITROGLYCERIN (NITROSTAT) 0.4 MG SL TABLET    Place 0.4 mg under the tongue every 5 (five) minutes as needed for Chest pain.    ONDANSETRON (ZOFRAN-ODT) 4 MG TBDL    Take 1 tablet (4 mg total) by mouth every 8 (eight) hours as needed (nausea).    PANTOPRAZOLE (PROTONIX) 40 MG TABLET    Take 40 mg by mouth once daily.       Review of patient's allergies indicates:   Allergen Reactions    Lortab [hydrocodone-acetaminophen] Itching       Past Surgical History:   Procedure Laterality Date    CARDIAC SURGERY  2013       Family History   Problem Relation Age of Onset    Hypertension Mother     Diabetes Mother     Hypertension Brother     Diabetes Brother     Seizures Son        Social History     Social History    Marital status:      Spouse name: N/A    Number of children: N/A    Years of education: N/A     Occupational History    Not on file.     Social History Main Topics    Smoking status: Never Smoker    Smokeless tobacco: Never Used    Alcohol use No    Drug use: No    Sexual activity: Yes     Partners: Male     Other Topics Concern    Not on file     Social History Narrative    No narrative on file       Vitals:    05/17/18 0842   BP: 135/60   Pulse: 60   Weight: 105.8 kg (233 lb 4 oz)   Height: 5' 6" (1.676 m)   PainSc: 0-No pain       Review of Systems   Constitutional: Negative for chills and fever.   Respiratory: Negative for shortness of breath.    Cardiovascular: Negative for chest pain, palpitations, orthopnea, claudication and leg swelling.   Gastrointestinal: " Negative for diarrhea, nausea and vomiting.   Musculoskeletal: Negative for joint pain.   Skin: Negative for rash.   Neurological: Positive for tingling and sensory change. Negative for dizziness, focal weakness and weakness.   Psychiatric/Behavioral: Negative.            Objective:      PHYSICAL EXAM: Apperance: Alert and orient in no distress,well developed, and with good attention to grooming and body habits  Patient presents ambulating in diabetic shoes with inserts.   LOWER EXTREMITY EXAM:  VASCULAR: Dorsalis pedis pulses 0/4(monphasic with doppler) bilateral and Posterior Tibial pulses 1/4 bilateral. Capillary fill time <4 seconds bilateral. No edema observed bilateral. Varicosities present bilateral. Skin temperature of the lower extremities is warm to cool, proximal to distal. Hair growth absent bilateral. (-) cellulits noted to right foot  DERMATOLOGICAL:  (--) edema, (--) erythema, (--) malodor, (--)  drainage, (--) warmth to right foot. Previous ulcer noted to right 1st planar submetatarsal closed with minimal hyperkeratotic tissue No open lesion noted. pink epithealized tissue.  The dorsum surface of the feet are soft and supple.  The plantar aspects of both feet are dry and scaly. Nails 1,2,3,4,5 bilateral thickened, and discolored with subungual debris. Webspaces 1-4 clean, dry and without evidence of break in skin integrity bilateral. Mild hyperkeratotic lesion noted to left plantar 1st submetatarsal. No open area post debridement. No erythema, drainage, or increased temp noted to area. Dry blood noted to right dorsal 2nd toe.   NEUROLOGICAL: Light touch, sharp-dull, proprioception all present and equal bilaterally.  Vibratory sensation diminished at bilateral hallux. Protective sensation absent at 4/10 sites as tested with a [place]-Brandy 5.07 monofilament.   MUSCULOSKELETAL: Muscle strength is 5/5 for foot inverters, everters, plantarflexors, and dorsiflexors. Muscle tone is normal. No pain on  palpation of right 2nd or 5th toe.           Assessment:       Encounter Diagnoses   Name Primary?    Skin abrasion - Right Foot Yes    Type II diabetes mellitus with peripheral circulatory disorder     Pre-ulcerative calluses          Plan:   Skin abrasion - Right Foot    Type II diabetes mellitus with peripheral circulatory disorder    Pre-ulcerative calluses      I counseled the patient on her conditions, regarding findings of my examination, my impressions, and usual treatment plan.   Greater than 15 minutes of a 20 minute appointment spent on education about the diabetic foot, neuropathy, and prevention of limb loss.  Patient instructed to watch area of skin abrasion to right 2nd toe and to contact office immediatly if any changes are noted.   Shoe inspection. Diabetic Foot Education. Patient reminded of the importance of good nutrition and blood sugar control to help prevent podiatric complications of diabetes. Patient instructed on proper foot hygeine. We discussed wearing proper shoe gear, daily foot inspections, never walking without protective shoe gear, never putting sharp instruments to feet.    Patient  will continue to monitor the areas daily, inspect feet, wear protective shoe gear when ambulatory, moisturizer to maintain skin integrity. Patient reminded of the importance of good nutrition and blood sugar control to help prevent podiatric complications of diabetes.  Patient to return 2 months or sooner if needed.               Viet Muir DPM  [facility]

## 2018-05-25 ENCOUNTER — NURSE TRIAGE (OUTPATIENT)
Dept: ADMINISTRATIVE | Facility: CLINIC | Age: 58
End: 2018-05-25

## 2018-05-25 NOTE — TELEPHONE ENCOUNTER
"    Reason for Disposition   [1] Caller requesting NON-URGENT health information AND [2] PCP's office is the best resource    Answer Assessment - Initial Assessment Questions  1. REASON FOR CALL or QUESTION: "What is your reason for calling today?" or "How can I best help you?" or "What question do you have that I can help answer?"      Ms. Preston went to the ED to have a bug removed from her ear. She states ED physician was unable to remove the bug. She states she has no symptoms at this time. Patient was advised to follow up with PCP for a referral for an ENT doctor. She states her PCP in out until next Tuesday. Patient would like to know if she can wait. Advised patient to call back if symptoms occur or return to the ED.    Protocols used: ST INFORMATION ONLY CALL-A-AH      "

## 2018-08-02 ENCOUNTER — OFFICE VISIT (OUTPATIENT)
Dept: PODIATRY | Facility: CLINIC | Age: 58
End: 2018-08-02
Payer: MEDICARE

## 2018-08-02 VITALS
WEIGHT: 237.88 LBS | BODY MASS INDEX: 38.23 KG/M2 | SYSTOLIC BLOOD PRESSURE: 148 MMHG | HEIGHT: 66 IN | RESPIRATION RATE: 20 BRPM | DIASTOLIC BLOOD PRESSURE: 62 MMHG | HEART RATE: 60 BPM

## 2018-08-02 DIAGNOSIS — B35.1 DERMATOPHYTOSIS OF NAIL: ICD-10-CM

## 2018-08-02 DIAGNOSIS — L84 PRE-ULCERATIVE CALLUSES: ICD-10-CM

## 2018-08-02 DIAGNOSIS — I73.9 PVD (PERIPHERAL VASCULAR DISEASE): ICD-10-CM

## 2018-08-02 DIAGNOSIS — E11.51 TYPE II DIABETES MELLITUS WITH PERIPHERAL CIRCULATORY DISORDER: Primary | ICD-10-CM

## 2018-08-02 PROCEDURE — 11055 PARING/CUTG B9 HYPRKER LES 1: CPT | Mod: Q8,S$GLB,, | Performed by: PODIATRIST

## 2018-08-02 PROCEDURE — 99499 UNLISTED E&M SERVICE: CPT | Mod: S$GLB,,, | Performed by: PODIATRIST

## 2018-08-02 PROCEDURE — 99999 PR PBB SHADOW E&M-EST. PATIENT-LVL III: CPT | Mod: PBBFAC,,, | Performed by: PODIATRIST

## 2018-08-02 PROCEDURE — 11721 DEBRIDE NAIL 6 OR MORE: CPT | Mod: 59,Q8,S$GLB, | Performed by: PODIATRIST

## 2018-08-02 NOTE — PROGRESS NOTES
Subjective:     Patient ID: Julia Preston is a 57 y.o. female.    Chief Complaint: Nail Care (last PCP visit was in June with Dr. Currie)    Julia is a 57 y.o. female who presents to the clinic for evaluation and treatment of high risk feet. Julia has a past medical history of Congestive heart failure, Diabetes mellitus, type 2, GERD (gastroesophageal reflux disease), High cholesterol, Hyperlipidemia, Hypertension, Pneumonia (6/2017 or 7/2017 per the patient), and Pneumonia (03/2018). The patient's chief complaint is long thick toenails. Patient denies any wounds or sores on her feet.  This patient has documented high risk feet requiring routine maintenance secondary to diabetes mellitis and those secondary complications of diabetes, as mentioned..    PCP: Idania Currie MD    Date Last Seen by PCP: 6/7/018    Current shoe gear:  Affected Foot: Rx diabetic extra depth shoes and custom accommodative insoles     Unaffected Foot: Rx diabetic extra depth shoes and custom accommodative insoles     Patient Active Problem List   Diagnosis    GERD (gastroesophageal reflux disease)    Type II diabetes mellitus with peripheral circulatory disorder    Hyperlipidemia    Hypertension    PVD (peripheral vascular disease)       Medication List with Changes/Refills   Current Medications    AMLODIPINE (NORVASC) 10 MG TABLET    Take 10 mg by mouth once daily.    ASPIRIN (ECOTRIN) 81 MG EC TABLET    Take 81 mg by mouth once daily.    ATORVASTATIN (LIPITOR) 80 MG TABLET    Take 80 mg by mouth once daily.    CARVEDILOL (COREG) 25 MG TABLET    Take 6.25 mg by mouth 2 (two) times daily with meals.     CEPHALEXIN (KEFLEX) 500 MG CAPSULE        CLONIDINE (CATAPRES) 0.1 MG TABLET    Take 0.1 mg by mouth.    CLOPIDOGREL (PLAVIX) 75 MG TABLET    Take 75 mg by mouth once daily.    ESOMEPRAZOLE MAGNESIUM (NEXIUM ORAL)    Take by mouth.    FUROSEMIDE (LASIX) 20 MG TABLET        GABAPENTIN (NEURONTIN) 300 MG CAPSULE    Take 300 mg  "by mouth 2 (two) times daily.    HYDRALAZINE (APRESOLINE) 50 MG TABLET    Take 50 mg by mouth 3 (three) times daily.    INSULIN ASPART PROTAMINE-INSULIN ASPART (NOVOLOG 70/30) 100 UNIT/ML (70-30) INPN PEN    Inject 40 Units into the skin every morning.    NIFEDIPINE (PROCARDIA XL) 60 MG (OSM) 24 HR TABLET    Take 60 mg by mouth once daily.    NITROGLYCERIN (NITROSTAT) 0.4 MG SL TABLET    Place 0.4 mg under the tongue every 5 (five) minutes as needed for Chest pain.    ONDANSETRON (ZOFRAN-ODT) 4 MG TBDL    Take 1 tablet (4 mg total) by mouth every 8 (eight) hours as needed (nausea).    PANTOPRAZOLE (PROTONIX) 40 MG TABLET    Take 40 mg by mouth once daily.       Review of patient's allergies indicates:   Allergen Reactions    Lortab [hydrocodone-acetaminophen] Itching       Past Surgical History:   Procedure Laterality Date    CARDIAC SURGERY  2013       Family History   Problem Relation Age of Onset    Hypertension Mother     Diabetes Mother     Hypertension Brother     Diabetes Brother     Seizures Son        Social History     Socioeconomic History    Marital status:      Spouse name: Not on file    Number of children: Not on file    Years of education: Not on file    Highest education level: Not on file   Social Needs    Financial resource strain: Not on file    Food insecurity - worry: Not on file    Food insecurity - inability: Not on file    Transportation needs - medical: Not on file    Transportation needs - non-medical: Not on file   Occupational History    Not on file   Tobacco Use    Smoking status: Never Smoker    Smokeless tobacco: Never Used   Substance and Sexual Activity    Alcohol use: No    Drug use: No    Sexual activity: Yes     Partners: Male   Other Topics Concern    Not on file   Social History Narrative    Not on file       Vitals:    08/02/18 0921   BP: (!) 148/62   Pulse: 60   Resp: 20   Weight: 107.9 kg (237 lb 14 oz)   Height: 5' 6" (1.676 m)   PainSc: 0-No " pain       Review of Systems   Constitutional: Negative for chills and fever.   Respiratory: Negative for shortness of breath.    Cardiovascular: Negative for chest pain, palpitations, orthopnea, claudication and leg swelling.   Gastrointestinal: Negative for diarrhea, nausea and vomiting.   Musculoskeletal: Negative for joint pain.   Skin: Negative for rash.   Neurological: Positive for tingling and sensory change. Negative for dizziness, focal weakness and weakness.   Psychiatric/Behavioral: Negative.            Objective:      PHYSICAL EXAM: Apperance: Alert and orient in no distress,well developed, and with good attention to grooming and body habits  Patient presents ambulating in diabetic shoes with inserts.   LOWER EXTREMITY EXAM:  VASCULAR: Dorsalis pedis pulses 0/4(monphasic with doppler) bilateral and Posterior Tibial pulses 1/4 bilateral. Capillary fill time <4 seconds bilateral. No edema observed bilateral. Varicosities present bilateral. Skin temperature of the lower extremities is warm to cool, proximal to distal. Hair growth absent bilateral. (-) cellulits noted to right foot  DERMATOLOGICAL:  (--) edema, (--) erythema, (--) malodor, (--)  drainage, (--) warmth to right foot. Previous ulcer noted to right 1st planar submetatarsal closed with minimal hyperkeratotic tissue No open lesion noted. pink epithealized tissue.  The dorsum surface of the feet are soft and supple.  The plantar aspects of both feet are dry and scaly. Nails 1,2,3,4,5 bilateral thickened, and discolored with subungual debris. Webspaces 1-4 clean, dry and without evidence of break in skin integrity bilateral. Mild hyperkeratotic lesion noted to left plantar 1st submetatarsal. No open area post debridement. No erythema, drainage, or increased temp noted to area.   NEUROLOGICAL: Light touch, sharp-dull, proprioception all present and equal bilaterally.  Vibratory sensation diminished at bilateral hallux. Protective sensation absent at  4/10 sites as tested with a [place]-Brandy 5.07 monofilament.   MUSCULOSKELETAL: Muscle strength is 5/5 for foot inverters, everters, plantarflexors, and dorsiflexors. Muscle tone is normal. No pain on palpation of right 2nd or 5th toe.           Assessment:       Encounter Diagnoses   Name Primary?    Type II diabetes mellitus with peripheral circulatory disorder Yes    Pre-ulcerative calluses     Dermatophytosis of nail     PVD (peripheral vascular disease)          Plan:   Type II diabetes mellitus with peripheral circulatory disorder    Pre-ulcerative calluses    Dermatophytosis of nail    PVD (peripheral vascular disease)      I counseled the patient on her conditions, regarding findings of my examination, my impressions, and usual treatment plan.   Greater than 15 minutes of a 20 minute appointment spent on education about the diabetic foot, neuropathy, and prevention of limb loss.  Patient instructed to watch area of skin abrasion to right 2nd toe and to contact office immediatly if any changes are noted.   Shoe inspection. Diabetic Foot Education. Patient reminded of the importance of good nutrition and blood sugar control to help prevent podiatric complications of diabetes. Patient instructed on proper foot hygeine. We discussed wearing proper shoe gear, daily foot inspections, never walking without protective shoe gear, never putting sharp instruments to feet.    With patient's permission, nails 1-5 bilateral were debrided/trimmed in length and thickness aggressively to their soft tissue attachment mechanically and with electric , removing all offending nail and debris. Patient relates relief following the procedure.  With patient's permission, right foot callus trimmed in thickness with #15 blade in thickness without incident.   Patient  will continue to monitor the areas daily, inspect feet, wear protective shoe gear when ambulatory, moisturizer to maintain skin integrity. Patient reminded  of the importance of good nutrition and blood sugar control to help prevent podiatric complications of diabetes.  Patient to return 2 months or sooner if needed.               Viet Muir DPM  [facility]

## 2018-08-12 PROBLEM — I73.9 PVD (PERIPHERAL VASCULAR DISEASE): Status: ACTIVE | Noted: 2018-08-12

## 2018-10-11 ENCOUNTER — OFFICE VISIT (OUTPATIENT)
Dept: OBSTETRICS AND GYNECOLOGY | Facility: CLINIC | Age: 58
End: 2018-10-11
Payer: MEDICARE

## 2018-10-11 VITALS
SYSTOLIC BLOOD PRESSURE: 132 MMHG | HEIGHT: 66 IN | BODY MASS INDEX: 38.04 KG/M2 | DIASTOLIC BLOOD PRESSURE: 64 MMHG | WEIGHT: 236.69 LBS

## 2018-10-11 DIAGNOSIS — Z12.4 SCREENING FOR CERVICAL CANCER: ICD-10-CM

## 2018-10-11 DIAGNOSIS — Z12.31 SCREENING MAMMOGRAM, ENCOUNTER FOR: ICD-10-CM

## 2018-10-11 DIAGNOSIS — Z01.419 ENCOUNTER FOR GYNECOLOGICAL EXAMINATION (GENERAL) (ROUTINE) WITHOUT ABNORMAL FINDINGS: Primary | ICD-10-CM

## 2018-10-11 PROCEDURE — 99214 OFFICE O/P EST MOD 30 MIN: CPT | Mod: PBBFAC,PN | Performed by: OBSTETRICS & GYNECOLOGY

## 2018-10-11 PROCEDURE — 99999 PR PBB SHADOW E&M-EST. PATIENT-LVL IV: CPT | Mod: PBBFAC,,, | Performed by: OBSTETRICS & GYNECOLOGY

## 2018-10-11 PROCEDURE — G0101 CA SCREEN;PELVIC/BREAST EXAM: HCPCS | Mod: PBBFAC,PN | Performed by: OBSTETRICS & GYNECOLOGY

## 2018-10-11 PROCEDURE — G0101 CA SCREEN;PELVIC/BREAST EXAM: HCPCS | Mod: S$PBB,,, | Performed by: OBSTETRICS & GYNECOLOGY

## 2018-10-11 RX ORDER — CARVEDILOL 12.5 MG/1
TABLET ORAL
COMMUNITY
Start: 2018-10-09

## 2018-10-11 RX ORDER — LANCETS
EACH MISCELLANEOUS
COMMUNITY
Start: 2018-07-26

## 2018-10-11 RX ORDER — HUMAN INSULIN 100 [USP'U]/ML
INJECTION, SUSPENSION SUBCUTANEOUS
COMMUNITY
Start: 2018-10-09

## 2018-10-11 RX ORDER — BLOOD SUGAR DIAGNOSTIC
STRIP MISCELLANEOUS
COMMUNITY
Start: 2018-07-26

## 2018-10-11 RX ORDER — ISOPROPYL ALCOHOL 0.75 G/1
SWAB TOPICAL
COMMUNITY
Start: 2018-07-26

## 2018-10-11 RX ORDER — HYDRALAZINE HYDROCHLORIDE 100 MG/1
TABLET, FILM COATED ORAL
COMMUNITY
Start: 2018-10-03

## 2018-10-11 RX ORDER — INSULIN ASPART 100 [IU]/ML
INJECTION, SUSPENSION SUBCUTANEOUS
COMMUNITY
Start: 2018-07-31

## 2018-10-11 RX ORDER — MENTHOL 5.8 MG/1
LOZENGE ORAL
COMMUNITY
Start: 2018-07-20 | End: 2020-01-10

## 2018-10-11 NOTE — PROGRESS NOTES
Subjective:       Patient ID: Julia Preston is a 58 y.o. female.    Chief Complaint:  Well Woman      History of Present Illness  HPI  Annual Exam-Postmenopausal  Patient presents for annual exam. The patient has no complaints today. The patient is sexually active.--denies pelvic pain; denies vaginal dryness;  GYN screening history: last pap: was normal and patient does not recall when last pap was and last mammogram: approximate date  and was normal. The patient is not currently taking hormone replacement therapy. Patient denies post-menopausal vaginal bleeding. The patient wears seatbelts: yes. The patient participates in regular exercise: no--limited by leg pains; . Has the patient ever been transfused or tattooed?: yes--trasfused during leg vascular surgery; . The patient reports that there is not domestic violence in her life.    Denies hot flushes, night sweats  No problems with sleep      Denies leak of urine with cough/sneeze         GYN & OB History  Patient's last menstrual period was 10/11/1988 (approximate).   Date of Last Pap: No result found    OB History    Para Term  AB Living   4 4 4     5   SAB TAB Ectopic Multiple Live Births         1 5      # Outcome Date GA Lbr Erik/2nd Weight Sex Delivery Anes PTL Lv   4A Term      CS-Unspec   LINA   4B Term      CS-Unspec   LIAN   3 Term      Vag-Spont   LINA   2 Term      Vag-Spont   LINA   1 Term      Vag-Spont   LINA          Review of Systems  Review of Systems   Musculoskeletal:        Leg pain   All other systems reviewed and are negative.          Objective:      Physical Exam:   Constitutional: She appears well-developed.     Eyes: Conjunctivae and EOM are normal. Pupils are equal, round, and reactive to light.    Neck: Normal range of motion. Neck supple.     Pulmonary/Chest: Effort normal. Right breast exhibits no mass, no nipple discharge, no skin change and no tenderness. Left breast exhibits no mass, no nipple discharge, no skin  change and no tenderness. Breasts are symmetrical.        Abdominal: Soft.     Genitourinary: Rectum normal and vagina normal. Pelvic exam was performed with patient supine. Uterus is absent. Right adnexum displays no mass and no tenderness. Left adnexum displays no tenderness. No erythema, bleeding, rectocele, cystocele or unspecified prolapse of vaginal walls in the vagina. No vaginal discharge found. Vaginal cuff normal.Labial bartholins normal.Cervix exhibits absence.           Musculoskeletal: Normal range of motion.       Neurological: She is alert.    Skin: Skin is warm.    Psychiatric: She has a normal mood and affect.           Assessment:         Encounter Diagnoses   Name Primary?    Screening mammogram, encounter for     Encounter for gynecological examination (general) (routine) without abnormal findings Yes    Screening for cervical cancer              Plan:      Continue annual well woman exam.  Pap not indicated due to hx of nml pap and hx of kusum  mammo ordered, continue yearly until age 73  Osteoporosis prevention  encouraged diet, exercise, weight loss

## 2018-10-12 ENCOUNTER — OFFICE VISIT (OUTPATIENT)
Dept: PODIATRY | Facility: CLINIC | Age: 58
End: 2018-10-12
Payer: MEDICARE

## 2018-10-12 VITALS
HEIGHT: 66 IN | HEART RATE: 69 BPM | DIASTOLIC BLOOD PRESSURE: 67 MMHG | WEIGHT: 237.19 LBS | SYSTOLIC BLOOD PRESSURE: 159 MMHG | BODY MASS INDEX: 38.12 KG/M2

## 2018-10-12 DIAGNOSIS — B35.1 DERMATOPHYTOSIS OF NAIL: ICD-10-CM

## 2018-10-12 DIAGNOSIS — I73.9 PVD (PERIPHERAL VASCULAR DISEASE): ICD-10-CM

## 2018-10-12 DIAGNOSIS — E11.49 TYPE II DIABETES MELLITUS WITH NEUROLOGICAL MANIFESTATIONS: Primary | ICD-10-CM

## 2018-10-12 DIAGNOSIS — R23.4 SKIN FISSURE: ICD-10-CM

## 2018-10-12 DIAGNOSIS — L84 PRE-ULCERATIVE CALLUSES: ICD-10-CM

## 2018-10-12 PROCEDURE — 11056 PARNG/CUTG B9 HYPRKR LES 2-4: CPT | Mod: Q8,PBBFAC,PO | Performed by: PODIATRIST

## 2018-10-12 PROCEDURE — 11721 DEBRIDE NAIL 6 OR MORE: CPT | Mod: Q8,PBBFAC,PO | Performed by: PODIATRIST

## 2018-10-12 PROCEDURE — 99999 PR PBB SHADOW E&M-EST. PATIENT-LVL III: CPT | Mod: PBBFAC,,, | Performed by: PODIATRIST

## 2018-10-12 PROCEDURE — 11721 DEBRIDE NAIL 6 OR MORE: CPT | Mod: 59,Q8,S$PBB, | Performed by: PODIATRIST

## 2018-10-12 PROCEDURE — 99213 OFFICE O/P EST LOW 20 MIN: CPT | Mod: S$PBB,25,, | Performed by: PODIATRIST

## 2018-10-12 PROCEDURE — 3008F BODY MASS INDEX DOCD: CPT | Mod: CPTII,,, | Performed by: PODIATRIST

## 2018-10-12 PROCEDURE — 99213 OFFICE O/P EST LOW 20 MIN: CPT | Mod: PBBFAC,PO,25 | Performed by: PODIATRIST

## 2018-10-12 PROCEDURE — 11056 PARNG/CUTG B9 HYPRKR LES 2-4: CPT | Mod: Q8,S$PBB,, | Performed by: PODIATRIST

## 2018-10-12 RX ORDER — GABAPENTIN 300 MG/1
300 CAPSULE ORAL 2 TIMES DAILY
Qty: 60 CAPSULE | Refills: 6 | Status: SHIPPED | OUTPATIENT
Start: 2018-10-12 | End: 2020-01-10 | Stop reason: ALTCHOICE

## 2018-10-12 NOTE — PROGRESS NOTES
Subjective:     Patient ID: Julia Preston is a 58 y.o. female.    Chief Complaint: Routine Foot Care (PCP Dr. Currie 09/27/18, [facility])    Julia is a 58 y.o. female who presents to the clinic for evaluation and treatment of high risk feet. Julia has a past medical history of Congestive heart failure, Diabetes mellitus, type 2, GERD (gastroesophageal reflux disease), High cholesterol, Hyperlipidemia, Hypertension, Pneumonia (6/2017 or 7/2017 per the patient), and Pneumonia (03/2018). The patient's chief complaint is long thick toenails. Patient denies any wounds or sores on her feet. Patient states there is a crack under her right big toe and she did notice blood on her sock.  This patient has documented high risk feet requiring routine maintenance secondary to diabetes mellitis and those secondary complications of diabetes, as mentioned. Patient also states she would like a refill on her Gabapentin.     PCP: Idania Currie MD    Date Last Seen by PCP: 9/27/18    Current shoe gear:  Affected Foot: Rx diabetic extra depth shoes and custom accommodative insoles     Unaffected Foot: Rx diabetic extra depth shoes and custom accommodative insoles     Patient Active Problem List   Diagnosis    GERD (gastroesophageal reflux disease)    Type II diabetes mellitus with peripheral circulatory disorder    Hyperlipidemia    Hypertension    PVD (peripheral vascular disease)          Medication List           Accurate as of 10/12/18  1:35 PM. If you have any questions, ask your nurse or doctor.               CONTINUE taking these medications    ACCU-CHEK SIOMARA PLUS TEST STRP Strp  Generic drug:  blood sugar diagnostic     ACCU-CHEK SOFTCLIX LANCETS Misc  Generic drug:  lancets     amLODIPine 10 MG tablet  Commonly known as:  NORVASC     aspirin 81 MG EC tablet  Commonly known as:  ECOTRIN     atorvastatin 80 MG tablet  Commonly known as:  LIPITOR     BD ALCOHOL SWABS Padm  Generic drug:  alcohol swabs     carvedilol  12.5 MG tablet  Commonly known as:  COREG     cloNIDine 0.1 MG tablet  Commonly known as:  CATAPRES     clopidogrel 75 mg tablet  Commonly known as:  PLAVIX     furosemide 20 MG tablet  Commonly known as:  LASIX     gabapentin 300 MG capsule  Commonly known as:  NEURONTIN  Take 1 capsule (300 mg total) by mouth 2 (two) times daily.     hydrALAZINE 100 MG tablet  Commonly known as:  APRESOLINE     * insulin aspart protamine-insulin aspart 100 unit/mL (70-30) Inpn pen  Commonly known as:  NovoLOG 70/30     * NovoLOG Mix 70-30 U-100 Insuln 100 unit/mL (70-30) Soln  Generic drug:  insulin asp prt-insulin aspart (NOVOLOG 70/30)     NEXIUM ORAL     nitroGLYCERIN 0.4 MG SL tablet  Commonly known as:  NITROSTAT     NovoLIN 70/30 U-100 Insulin 100 unit/mL (70-30) injection  Generic drug:  insulin NPH-insulin regular (70/30)     pantoprazole 40 MG tablet  Commonly known as:  PROTONIX     PROCARDIA XL 60 MG (OSM) 24 hr tablet  Generic drug:  NIFEdipine     VITAMIN B-12 100 MCG tablet  Generic drug:  cyanocobalamin         * This list has 2 medication(s) that are the same as other medications prescribed for you. Read the directions carefully, and ask your doctor or other care provider to review them with you.               Where to Get Your Medications      These medications were sent to [facility] - [place], [place] - [address]  [address] [facility] [address]    Phone:  [phone]   · gabapentin 300 MG capsule         Review of patient's allergies indicates:   Allergen Reactions    Lortab [hydrocodone-acetaminophen] Itching       Past Surgical History:   Procedure Laterality Date    CARDIAC SURGERY  2013     SECTION      HYSTERECTOMY         Family History   Problem Relation Age of Onset    Hypertension Mother     Diabetes Mother     Hypertension Brother     Diabetes Brother     Seizures Son        Social History     Socioeconomic History    Marital status:       "Spouse name: Not on file    Number of children: Not on file    Years of education: Not on file    Highest education level: Not on file   Social Needs    Financial resource strain: Not on file    Food insecurity - worry: Not on file    Food insecurity - inability: Not on file    Transportation needs - medical: Not on file    Transportation needs - non-medical: Not on file   Occupational History    Not on file   Tobacco Use    Smoking status: Never Smoker    Smokeless tobacco: Never Used   Substance and Sexual Activity    Alcohol use: No    Drug use: No    Sexual activity: Yes     Partners: Male   Other Topics Concern    Not on file   Social History Narrative    Not on file       Vitals:    10/12/18 0938   BP: (!) 159/67   Pulse: 69   Weight: 107.6 kg (237 lb 3.4 oz)   Height: 5' 6" (1.676 m)       Review of Systems   Constitutional: Negative for chills and fever.   Respiratory: Negative for shortness of breath.    Cardiovascular: Negative for chest pain, palpitations, orthopnea, claudication and leg swelling.   Gastrointestinal: Negative for diarrhea, nausea and vomiting.   Musculoskeletal: Negative for joint pain.   Skin: Negative for rash.   Neurological: Positive for tingling and sensory change. Negative for dizziness, focal weakness and weakness.   Psychiatric/Behavioral: Negative.            Objective:      PHYSICAL EXAM: Apperance: Alert and orient in no distress,well developed, and with good attention to grooming and body habits  Patient presents ambulating in diabetic shoes with inserts.   LOWER EXTREMITY EXAM:  VASCULAR: Dorsalis pedis pulses 0/4(monphasic with doppler) bilateral and Posterior Tibial pulses 1/4 bilateral. Capillary fill time <4 seconds bilateral. No edema observed bilateral. Varicosities present bilateral. Skin temperature of the lower extremities is warm to cool, proximal to distal. Hair growth absent bilateral. (-) cellulits noted to right foot  DERMATOLOGICAL:  (--) edema, " (--) erythema, (--) malodor, (--)  drainage, (--) warmth to right foot. Skin fissure noted to right plantar hallux. No erythema, drainage, or increased temp noted to area. The dorsum surface of the feet are soft and supple.  The plantar aspects of both feet are dry and scaly. Nails 1,2,3,4,5 bilateral elongated, thickened, and discolored with subungual debris. Webspaces 1-4 clean, dry and without evidence of break in skin integrity bilateral. Minimal hyperkeratotic lesion noted to bilateral plantar 1st submetatarsal. No open area post debridement. No erythema, drainage, or increased temp noted to area.   NEUROLOGICAL: Light touch, sharp-dull, proprioception all present and equal bilaterally.  Vibratory sensation diminished at bilateral hallux. Protective sensation absent at 4/10 sites as tested with a [place]-Brandy 5.07 monofilament.   MUSCULOSKELETAL: Muscle strength is 5/5 for foot inverters, everters, plantarflexors, and dorsiflexors. Muscle tone is normal. No pain on palpation of right 2nd or 5th toe.           Assessment:       Encounter Diagnoses   Name Primary?    Type II diabetes mellitus with neurological manifestations Yes    Pre-ulcerative calluses     Dermatophytosis of nail     PVD (peripheral vascular disease)     Skin fissure          Plan:   Type II diabetes mellitus with neurological manifestations  -     gabapentin (NEURONTIN) 300 MG capsule; Take 1 capsule (300 mg total) by mouth 2 (two) times daily.  Dispense: 60 capsule; Refill: 6    Pre-ulcerative calluses    Dermatophytosis of nail    PVD (peripheral vascular disease)    Skin fissure      I counseled the patient on her conditions, regarding findings of my examination, my impressions, and usual treatment plan.   Greater than 15 minutes of a 20 minute appointment spent on education about the diabetic foot, neuropathy, and prevention of limb loss.  Patient instructed to watch area of skin fissure to right hallux and to contact office  immediatly if any changes are noted.   Shoe inspection. Diabetic Foot Education. Patient reminded of the importance of good nutrition and blood sugar control to help prevent podiatric complications of diabetes. Patient instructed on proper foot hygeine. We discussed wearing proper shoe gear, daily foot inspections, never walking without protective shoe gear, never putting sharp instruments to feet.    With patient's permission, nails 1-5 bilateral were debrided/trimmed in length and thickness aggressively to their soft tissue attachment mechanically and with electric , removing all offending nail and debris. Patient relates relief following the procedure.  With patient's permission, bilateral foot callus trimmed in thickness with #15 blade in thickness without incident.   Prescription written for Gabapentin 300mg to be taken twice nightly. Informed patient of possible side effects including but not limited to disorientation and drowsiness. Patient instructed to discontinue use if there are any adverse effects. Patient states she understands.   Patient  will continue to monitor the areas daily, inspect feet, wear protective shoe gear when ambulatory, moisturizer to maintain skin integrity. Patient reminded of the importance of good nutrition and blood sugar control to help prevent podiatric complications of diabetes.  Patient to return 2 months or sooner if needed.               Viet Muir DPM  [facility]

## 2018-10-18 ENCOUNTER — HOSPITAL ENCOUNTER (OUTPATIENT)
Dept: RADIOLOGY | Facility: HOSPITAL | Age: 58
Discharge: HOME | End: 2018-10-18
Attending: OBSTETRICS & GYNECOLOGY
Payer: MEDICARE

## 2018-10-18 VITALS — WEIGHT: 237 LBS | BODY MASS INDEX: 38.09 KG/M2 | HEIGHT: 66 IN

## 2018-10-18 DIAGNOSIS — Z01.419 ENCOUNTER FOR GYNECOLOGICAL EXAMINATION (GENERAL) (ROUTINE) WITHOUT ABNORMAL FINDINGS: ICD-10-CM

## 2018-10-18 DIAGNOSIS — Z12.31 SCREENING MAMMOGRAM, ENCOUNTER FOR: ICD-10-CM

## 2018-10-18 PROCEDURE — 77067 SCR MAMMO BI INCL CAD: CPT | Mod: TC,PO

## 2018-10-18 PROCEDURE — 77067 SCR MAMMO BI INCL CAD: CPT | Mod: 26,,, | Performed by: RADIOLOGY

## 2018-11-02 ENCOUNTER — TELEPHONE (OUTPATIENT)
Dept: PODIATRY | Facility: CLINIC | Age: 58
End: 2018-11-02

## 2018-11-02 NOTE — TELEPHONE ENCOUNTER
----- Message from Haydee Ho sent at 11/2/2018 11:51 AM CDT -----  Contact: pt  Pt stated she has fluid coming out of her toe with a odor, she can be reached at [phone] Thanks

## 2018-11-02 NOTE — TELEPHONE ENCOUNTER
Ms. YARIEL Preston was given a return call, and she informed me that her toe was drainage and looked swollen. She was informed that Dr. Muir out of the clinic until Monday afternoon, and she should visit urgent care to have her toe examined. Ms. YARIEL Preston replied saying those places are too high, and she will wait until Dr. Muir returns to clinic on Monday and stay off of her foot until then. Before the call ended she was advised to go to urgent care again, and that Dr. Muir will be made aware of this message when she returns to the clinic on Monday afternoon. Call ended well.

## 2018-11-05 DIAGNOSIS — T14.8XXA SKIN ABRASION: Primary | ICD-10-CM

## 2018-11-05 RX ORDER — DOXYCYCLINE 100 MG/1
100 CAPSULE ORAL 2 TIMES DAILY
Qty: 20 CAPSULE | Refills: 0 | Status: SHIPPED | OUTPATIENT
Start: 2018-11-05 | End: 2020-01-10

## 2018-11-28 ENCOUNTER — OFFICE VISIT (OUTPATIENT)
Dept: PODIATRY | Facility: CLINIC | Age: 58
End: 2018-11-28
Payer: MEDICARE

## 2018-11-28 VITALS
WEIGHT: 240.5 LBS | BODY MASS INDEX: 38.65 KG/M2 | DIASTOLIC BLOOD PRESSURE: 69 MMHG | HEART RATE: 69 BPM | SYSTOLIC BLOOD PRESSURE: 149 MMHG | HEIGHT: 66 IN

## 2018-11-28 DIAGNOSIS — E11.49 TYPE II DIABETES MELLITUS WITH NEUROLOGICAL MANIFESTATIONS: ICD-10-CM

## 2018-11-28 DIAGNOSIS — L97.512 NEUROPATHIC ULCER OF RIGHT FOOT WITH FAT LAYER EXPOSED: Primary | ICD-10-CM

## 2018-11-28 DIAGNOSIS — I73.9 PVD (PERIPHERAL VASCULAR DISEASE): ICD-10-CM

## 2018-11-28 PROCEDURE — 3008F BODY MASS INDEX DOCD: CPT | Mod: CPTII,S$GLB,, | Performed by: PODIATRIST

## 2018-11-28 PROCEDURE — 11042 DBRDMT SUBQ TIS 1ST 20SQCM/<: CPT | Mod: S$GLB,,, | Performed by: PODIATRIST

## 2018-11-28 PROCEDURE — 99999 PR PBB SHADOW E&M-EST. PATIENT-LVL III: CPT | Mod: PBBFAC,,, | Performed by: PODIATRIST

## 2018-11-28 PROCEDURE — 99213 OFFICE O/P EST LOW 20 MIN: CPT | Mod: 25,S$GLB,, | Performed by: PODIATRIST

## 2018-11-28 NOTE — PROGRESS NOTES
Subjective:     Patient ID: Julia rPeston is a 58 y.o. female.    Chief Complaint: Foot Problem (right foot/right great toe, diabetic patient )    Julia is a 58 y.o. female who presents to the clinic for evaluation and treatment of high risk feet. Julia has a past medical history of Congestive heart failure, Congestive heart failure (2018), Diabetes mellitus, type 2, GERD (gastroesophageal reflux disease), High cholesterol, Hyperlipidemia, Hypertension, Pneumonia (6/2017 or 7/2017 per the patient), and Pneumonia (03/2018). The patient's chief complaint is diabetic foot ulcer, right big toe. Patient states she noticed blood on her socks last week. Patient states she is taking the antibiotics as prescribed. Patient also states she has been applying Betadine to the area. This patient has documented high risk feet requiring routine maintenance secondary to diabetes mellitis and those secondary complications of diabetes, as mentioned..    PCP: Idania Currie MD    Date Last Seen by PCP: 9/20/18    Current shoe gear:  Affected Foot: Extra depth shoes     Unaffected Foot: Extra depth shoes    History of Trauma: negative  Sign of Infection: none    Patient Active Problem List   Diagnosis    GERD (gastroesophageal reflux disease)    Type II diabetes mellitus with peripheral circulatory disorder    Hyperlipidemia    Hypertension    PVD (peripheral vascular disease)          Medication List           Accurate as of 11/28/18 11:59 PM. If you have any questions, ask your nurse or doctor.               CONTINUE taking these medications    ACCU-CHEK SIOMARA PLUS TEST STRP Strp  Generic drug:  blood sugar diagnostic     ACCU-CHEK SOFTCLIX LANCETS Misc  Generic drug:  lancets     amLODIPine 10 MG tablet  Commonly known as:  NORVASC     aspirin 81 MG EC tablet  Commonly known as:  ECOTRIN     atorvastatin 80 MG tablet  Commonly known as:  LIPITOR     BD ALCOHOL SWABS Padm  Generic drug:  alcohol swabs     carvedilol  12.5 MG tablet  Commonly known as:  COREG     cloNIDine 0.1 MG tablet  Commonly known as:  CATAPRES     clopidogrel 75 mg tablet  Commonly known as:  PLAVIX     doxycycline 100 MG Cap  Commonly known as:  VIBRAMYCIN  Take 1 capsule (100 mg total) by mouth 2 (two) times daily.     furosemide 20 MG tablet  Commonly known as:  LASIX     gabapentin 300 MG capsule  Commonly known as:  NEURONTIN  Take 1 capsule (300 mg total) by mouth 2 (two) times daily.     hydrALAZINE 100 MG tablet  Commonly known as:  APRESOLINE     * insulin aspart protamine-insulin aspart 100 unit/mL (70-30) Inpn pen  Commonly known as:  NovoLOG 70/30     * NovoLOG Mix 70-30 U-100 Insuln 100 unit/mL (70-30) Soln  Generic drug:  insulin asp prt-insulin aspart (NOVOLOG 70/30)     NEXIUM ORAL     nitroGLYCERIN 0.4 MG SL tablet  Commonly known as:  NITROSTAT     NovoLIN 70/30 U-100 Insulin 100 unit/mL (70-30) injection  Generic drug:  insulin NPH-insulin regular (70/30)     pantoprazole 40 MG tablet  Commonly known as:  PROTONIX     PROCARDIA XL 60 MG (OSM) 24 hr tablet  Generic drug:  NIFEdipine     VITAMIN B-12 100 MCG tablet  Generic drug:  cyanocobalamin         * This list has 2 medication(s) that are the same as other medications prescribed for you. Read the directions carefully, and ask your doctor or other care provider to review them with you.                Review of patient's allergies indicates:   Allergen Reactions    Lortab [hydrocodone-acetaminophen] Itching       Past Surgical History:   Procedure Laterality Date    BREAST BIOPSY      CARDIAC SURGERY  2013     SECTION      HYSTERECTOMY         Family History   Problem Relation Age of Onset    Hypertension Mother     Diabetes Mother     Hypertension Brother     Diabetes Brother     Seizures Son        Social History     Socioeconomic History    Marital status:      Spouse name: Not on file    Number of children: Not on file    Years of education: Not on file  "   Highest education level: Not on file   Social Needs    Financial resource strain: Not on file    Food insecurity - worry: Not on file    Food insecurity - inability: Not on file    Transportation needs - medical: Not on file    Transportation needs - non-medical: Not on file   Occupational History    Not on file   Tobacco Use    Smoking status: Never Smoker    Smokeless tobacco: Never Used   Substance and Sexual Activity    Alcohol use: No    Drug use: No    Sexual activity: Yes     Partners: Male   Other Topics Concern    Not on file   Social History Narrative    Not on file       Vitals:    11/28/18 1516   BP: (!) 149/69   Pulse: 69   Weight: 109.1 kg (240 lb 8.4 oz)   Height: 5' 6" (1.676 m)   PainSc:   3       Review of Systems   Constitutional: Negative for chills and fever.   Respiratory: Negative for shortness of breath.    Cardiovascular: Negative for chest pain, palpitations, orthopnea, claudication and leg swelling.   Gastrointestinal: Negative for diarrhea, nausea and vomiting.   Musculoskeletal: Negative for joint pain.   Skin: Negative for rash.   Neurological: Positive for sensory change. Negative for dizziness, tingling, focal weakness and weakness.   Psychiatric/Behavioral: Negative.              Objective:      PHYSICAL EXAM: Apperance: Alert and orient in no distress,well developed, and with good attention to grooming and body habits  Patient presents ambulating in extra depth shoes.   Lower Extremity Exam  VASCULAR: Dorsalis pedis pulses 0/4(monphasic with doppler) bilateral and Posterior Tibial pulses 1/4 bilateral. Capillary fill time <4 seconds bilateral. No edema observed bilateral. Varicosities present bilateral. Skin temperature of the lower extremities is warm to cool, proximal to distal. Hair growth absent bilateral. (--) lymphangitis or (--) cellulitis noted right.  DERMATOLOGICAL: (+) edema, (--) erythema, (--) malodor, (--) drainage, (+) warmth to right foot.  Ulcer " "noted to right plantar hallux with granular base and with a 2 mm yellow/white border and hyperkeratosis surrounding ulcer. Ulcer measurement (post) 0.7cm x 2.5cm x 0.2cm.  The ulcer does not extend into deeper tissue and (--) sinus tracts exist.  The dorsum surface of the feet are soft and supple.  The plantar aspects of feet are dry and scaly. Webspaces 1,2,3,4 clean, dry and without evidence of break in skin integrity bilateral.   NEUROLOGICAL: Light touch, sharp-dull, proprioception all present and equal bilaterally.  Vibratory sensation diminished at bilateral hallux. Protective sensation absent at 4/10 sites as tested with a [place]-Brandy 5.07 monofilament.   MUSCULOSKELETAL: Muscle strength is 5/5 for foot inverters, everters, plantarflexors, and dorsiflexors. Muscle tone is normal.                   Assessment:       Encounter Diagnoses   Name Primary?    Neuropathic ulcer of right foot with fat layer exposed Yes    Type II diabetes mellitus with neurological manifestations     PVD (peripheral vascular disease)          Plan:   Neuropathic ulcer of right foot with fat layer exposed    Type II diabetes mellitus with neurological manifestations    PVD (peripheral vascular disease)      I counseled the patient on her conditions, regarding findings of my examination, my impressions, and usual treatment plan.   Short-term goals include maintaining good offloading and minimizing bioburden, promoting granulation and epithelialization to healing.  Long-term goals include keeping the wound healed by good offloading and medical management under the direction of internist.    Wound Debridement  Date/Time: 11/28/18 @ 2:50pm  Performed by: TEE HARO DPM  Authorized by: TEE HARO DPM    Time out: Immediately prior to procedure a "time out" was called to verify the correct patient, procedure, equipment, support staff and site/side marked as required.   Consent Done?:  Yes (Verbal)    Preparation: Patient " was prepped and draped in usual sterile fashion    Local anesthesia used?: No       Wound Details:    Location: Right Hallux    Type of Debridement:  Excisional       Length (cm):  0.7cm       Width (cm):  2.5cm       Percent Debrided (%):  100       Depth (cm):  0.2cm           Depth of debridement:  Subcutaneous tissue    Tissue debrided:  Subcutaneous    Devitalized tissue debrided:  Callus, Necrotic/Eschar/Fibrotic and Slough    Instruments:  #15 blade and tissue nipper     Bleeding:  Mild  Hemostasis Achieved: Yes    Method Used:  Pressure      Wound was irrigated with sterile saline and cleansed with wound cleanse cloth. The patient tolerated this well. Wound was then dressed with Silvadene, gauze, and papertape. Patient was given instructions on daily dressing changes. Patient was also instructed on the importance of keeping the wound and dressings clean dry and intact and keeping pressure off the wound area until complete healing of the wound. Home wound care instructions ar provided.  Follow-up:Patient is to return to the clinic in 3 weeks  for follow-up but should call [facility] immediately if any signs of infection, such as fever, chills, sweats, increased redness or pain.                  Viet Muir DPM  [facility]

## 2018-12-28 ENCOUNTER — OFFICE VISIT (OUTPATIENT)
Dept: PODIATRY | Facility: CLINIC | Age: 58
End: 2018-12-28
Payer: MEDICARE

## 2018-12-28 VITALS
BODY MASS INDEX: 39.8 KG/M2 | SYSTOLIC BLOOD PRESSURE: 127 MMHG | RESPIRATION RATE: 16 BRPM | DIASTOLIC BLOOD PRESSURE: 59 MMHG | HEART RATE: 59 BPM | WEIGHT: 247.63 LBS | HEIGHT: 66 IN

## 2018-12-28 DIAGNOSIS — L84 PRE-ULCERATIVE CALLUSES: ICD-10-CM

## 2018-12-28 DIAGNOSIS — E11.49 TYPE II DIABETES MELLITUS WITH NEUROLOGICAL MANIFESTATIONS: Primary | ICD-10-CM

## 2018-12-28 DIAGNOSIS — I73.9 PVD (PERIPHERAL VASCULAR DISEASE): ICD-10-CM

## 2018-12-28 DIAGNOSIS — B35.1 DERMATOPHYTOSIS OF NAIL: ICD-10-CM

## 2018-12-28 PROCEDURE — 99999 PR PBB SHADOW E&M-EST. PATIENT-LVL III: ICD-10-PCS | Mod: PBBFAC,,, | Performed by: PODIATRIST

## 2018-12-28 PROCEDURE — 99499 UNLISTED E&M SERVICE: CPT | Mod: S$GLB,,, | Performed by: PODIATRIST

## 2018-12-28 PROCEDURE — 99999 PR PBB SHADOW E&M-EST. PATIENT-LVL III: CPT | Mod: PBBFAC,,, | Performed by: PODIATRIST

## 2018-12-28 PROCEDURE — 11721 DEBRIDE NAIL 6 OR MORE: CPT | Mod: Q8,S$GLB,, | Performed by: PODIATRIST

## 2018-12-28 PROCEDURE — 99499 NO LOS: ICD-10-PCS | Mod: S$GLB,,, | Performed by: PODIATRIST

## 2018-12-28 PROCEDURE — 11721 PR DEBRIDEMENT OF NAILS, 6 OR MORE: ICD-10-PCS | Mod: Q8,S$GLB,, | Performed by: PODIATRIST

## 2019-01-07 NOTE — PROGRESS NOTES
Subjective:     Patient ID: Julia Preston is a 58 y.o. female.    Chief Complaint: Routine Foot Care (no c/o pain, wears tennis shoes with socks, diabetic Pt, PCP Dr. Currie)    Julia is a 58 y.o. female who presents to the clinic for evaluation and treatment of high risk feet. Julia has a past medical history of Congestive heart failure, Congestive heart failure (2018), Diabetes mellitus, type 2, GERD (gastroesophageal reflux disease), High cholesterol, Hyperlipidemia, Hypertension, Pneumonia (6/2017 or 7/2017 per the patient), and Pneumonia (03/2018). The patient's chief complaint is diabetic right big toe wound and long toenails.  Patient states she noticed blood on her socks last week. Patient states she completed the antibiotics as prescribed. Patient also states she dressed toe ans instructed and the toe is better. This patient has documented high risk feet requiring routine maintenance secondary to diabetes mellitis and those secondary complications of diabetes, as mentioned..    PCP: Idania Currie MD    Date Last Seen by PCP: 9/20/18    Current shoe gear:  Affected Foot: Extra depth shoes     Unaffected Foot: Extra depth shoes    History of Trauma: negative  Sign of Infection: none    Patient Active Problem List   Diagnosis    GERD (gastroesophageal reflux disease)    Type II diabetes mellitus with peripheral circulatory disorder    Hyperlipidemia    Hypertension    PVD (peripheral vascular disease)          Medication List           Accurate as of 12/28/18 11:59 PM. If you have any questions, ask your nurse or doctor.               CONTINUE taking these medications    ACCU-CHEK SIOMARA PLUS TEST STRP Strp  Generic drug:  blood sugar diagnostic     ACCU-CHEK SOFTCLIX LANCETS Misc  Generic drug:  lancets     amLODIPine 10 MG tablet  Commonly known as:  NORVASC     aspirin 81 MG EC tablet  Commonly known as:  ECOTRIN     atorvastatin 80 MG tablet  Commonly known as:  LIPITOR     BD ALCOHOL  SWABS Padm  Generic drug:  alcohol swabs     carvedilol 12.5 MG tablet  Commonly known as:  COREG     cloNIDine 0.1 MG tablet  Commonly known as:  CATAPRES     clopidogrel 75 mg tablet  Commonly known as:  PLAVIX     doxycycline 100 MG Cap  Commonly known as:  VIBRAMYCIN  Take 1 capsule (100 mg total) by mouth 2 (two) times daily.     furosemide 20 MG tablet  Commonly known as:  LASIX     gabapentin 300 MG capsule  Commonly known as:  NEURONTIN  Take 1 capsule (300 mg total) by mouth 2 (two) times daily.     hydrALAZINE 100 MG tablet  Commonly known as:  APRESOLINE     * insulin aspart protamine-insulin aspart 100 unit/mL (70-30) Inpn pen  Commonly known as:  NovoLOG 70/30     * NovoLOG Mix 70-30 U-100 Insuln 100 unit/mL (70-30) Soln  Generic drug:  insulin asp prt-insulin aspart (NOVOLOG 70/30)     NEXIUM ORAL     nitroGLYCERIN 0.4 MG SL tablet  Commonly known as:  NITROSTAT     NovoLIN 70/30 U-100 Insulin 100 unit/mL (70-30) injection  Generic drug:  insulin NPH-insulin regular (70/30)     pantoprazole 40 MG tablet  Commonly known as:  PROTONIX     PROCARDIA XL 60 MG (OSM) 24 hr tablet  Generic drug:  NIFEdipine     VITAMIN B-12 100 MCG tablet  Generic drug:  cyanocobalamin         * This list has 2 medication(s) that are the same as other medications prescribed for you. Read the directions carefully, and ask your doctor or other care provider to review them with you.                Review of patient's allergies indicates:   Allergen Reactions    Lortab [hydrocodone-acetaminophen] Itching       Past Surgical History:   Procedure Laterality Date    BREAST BIOPSY      CARDIAC SURGERY  2013     SECTION      HYSTERECTOMY         Family History   Problem Relation Age of Onset    Hypertension Mother     Diabetes Mother     Hypertension Brother     Diabetes Brother     Seizures Son        Social History     Socioeconomic History    Marital status:      Spouse name: Not on file    Number of  "children: Not on file    Years of education: Not on file    Highest education level: Not on file   Social Needs    Financial resource strain: Not on file    Food insecurity - worry: Not on file    Food insecurity - inability: Not on file    Transportation needs - medical: Not on file    Transportation needs - non-medical: Not on file   Occupational History    Not on file   Tobacco Use    Smoking status: Never Smoker    Smokeless tobacco: Never Used   Substance and Sexual Activity    Alcohol use: No    Drug use: No    Sexual activity: Yes     Partners: Male   Other Topics Concern    Not on file   Social History Narrative    Not on file       Vitals:    12/28/18 0923   BP: (!) 127/59   Pulse: (!) 59   Resp: 16   Weight: 112.3 kg (247 lb 9.9 oz)   Height: 5' 6" (1.676 m)   PainSc: 0-No pain       Review of Systems   Constitutional: Negative for chills and fever.   Respiratory: Negative for shortness of breath.    Cardiovascular: Negative for chest pain, palpitations, orthopnea, claudication and leg swelling.   Gastrointestinal: Negative for diarrhea, nausea and vomiting.   Musculoskeletal: Negative for joint pain.   Skin: Negative for rash.   Neurological: Positive for sensory change. Negative for dizziness, tingling, focal weakness and weakness.   Psychiatric/Behavioral: Negative.              Objective:      PHYSICAL EXAM: Apperance: Alert and orient in no distress,well developed, and with good attention to grooming and body habits  Patient presents ambulating in extra depth shoes.   Lower Extremity Exam  VASCULAR: Dorsalis pedis pulses 0/4(monphasic with doppler) bilateral and Posterior Tibial pulses 1/4 bilateral. Capillary fill time <4 seconds bilateral. No edema observed bilateral. Varicosities present bilateral. Skin temperature of the lower extremities is warm to cool, proximal to distal. Hair growth absent bilateral. (--) lymphangitis or (--) cellulitis noted right.  DERMATOLOGICAL: (-) edema, " (-) erythema, (-) malodor, (-) drainage, (-) warmth to right foot.  Previous ulcer noted to right plantar hallux closed with pink epithealized tissue. The dorsum surface of the feet are soft and supple.  The plantar aspects of feet are dry and scaly. Webspaces 1,2,3,4 clean, dry and without evidence of break in skin integrity bilateral. Nails 1,2,3,4,5 bilateral elongated, thickened, and discolored with subungual debris.  NEUROLOGICAL: Light touch, sharp-dull, proprioception all present and equal bilaterally.  Vibratory sensation diminished at bilateral hallux. Protective sensation absent at 4/10 sites as tested with a [place]-Brandy 5.07 monofilament.   MUSCULOSKELETAL: Muscle strength is 5/5 for foot inverters, everters, plantarflexors, and dorsiflexors. Muscle tone is normal.           Assessment:       Encounter Diagnoses   Name Primary?    Type II diabetes mellitus with neurological manifestations Yes    PVD (peripheral vascular disease)     Dermatophytosis of nail     Pre-ulcerative calluses - Right Foot          Plan:   Type II diabetes mellitus with neurological manifestations    PVD (peripheral vascular disease)    Dermatophytosis of nail    Pre-ulcerative calluses - Right Foot      I counseled the patient on her conditions, regarding findings of my examination, my impressions, and usual treatment plan.   Greater than 15 minutes of a 20 minute appointment spent on education about the diabetic foot, neuropathy, and prevention of limb loss.  Shoe inspection. Diabetic Foot Education. Patient reminded of the importance of good nutrition and blood sugar control to help prevent podiatric complications of diabetes. Patient instructed on proper foot hygeine. We discussed wearing proper shoe gear, daily foot inspections, never walking without protective shoe gear, never putting sharp instruments to feet.    With patient's permission, nails 1-5 bilateral were debrided/trimmed in length and thickness  aggressively to their soft tissue attachment mechanically and with electric , removing all offending nail and debris. Patient relates relief following the procedure.  Patient  will continue to monitor the areas daily, inspect feet, wear protective shoe gear when ambulatory, moisturizer to maintain skin integrity. Patient reminded of the importance of good nutrition and blood sugar control to help prevent podiatric complications of diabetes.  Patient to return 2 months or sooner if needed.                 Viet Muir DPM  [facility]

## 2019-01-18 ENCOUNTER — TELEPHONE (OUTPATIENT)
Dept: PODIATRY | Facility: CLINIC | Age: 59
End: 2019-01-18

## 2019-01-18 NOTE — TELEPHONE ENCOUNTER
Called to discuss any questions Mrs. Preston may have had, there was no answer. I left a message asking for a call back.      ----- Message from Bella Rosas sent at 1/18/2019 11:32 AM CST -----  Contact: Patient  Patient called and stated that she is still having the same issue with her toe; she stated she called in about a week ago to speak with the nurse about no one has called her back. She can be contacted at [phone].    Thanks,  Bella

## 2019-03-01 ENCOUNTER — TELEPHONE (OUTPATIENT)
Dept: PODIATRY | Facility: CLINIC | Age: 59
End: 2019-03-01

## 2019-03-04 ENCOUNTER — TELEPHONE (OUTPATIENT)
Dept: PODIATRY | Facility: CLINIC | Age: 59
End: 2019-03-04

## 2019-03-11 ENCOUNTER — HOSPITAL ENCOUNTER (OUTPATIENT)
Dept: RADIOLOGY | Facility: HOSPITAL | Age: 59
Discharge: HOME | End: 2019-03-11
Attending: PODIATRIST
Payer: MEDICARE

## 2019-03-11 ENCOUNTER — OFFICE VISIT (OUTPATIENT)
Dept: PODIATRY | Facility: CLINIC | Age: 59
End: 2019-03-11
Payer: MEDICARE

## 2019-03-11 VITALS
WEIGHT: 237.19 LBS | SYSTOLIC BLOOD PRESSURE: 113 MMHG | BODY MASS INDEX: 38.12 KG/M2 | DIASTOLIC BLOOD PRESSURE: 47 MMHG | HEIGHT: 66 IN

## 2019-03-11 DIAGNOSIS — E11.49 TYPE II DIABETES MELLITUS WITH NEUROLOGICAL MANIFESTATIONS: ICD-10-CM

## 2019-03-11 DIAGNOSIS — L84 PRE-ULCERATIVE CALLUSES: ICD-10-CM

## 2019-03-11 DIAGNOSIS — I73.9 PVD (PERIPHERAL VASCULAR DISEASE): ICD-10-CM

## 2019-03-11 DIAGNOSIS — B35.1 DERMATOPHYTOSIS OF NAIL: ICD-10-CM

## 2019-03-11 DIAGNOSIS — L84 PRE-ULCERATIVE CALLUSES: Primary | ICD-10-CM

## 2019-03-11 PROCEDURE — 3008F PR BODY MASS INDEX (BMI) DOCUMENTED: ICD-10-PCS | Mod: CPTII,S$GLB,, | Performed by: PODIATRIST

## 2019-03-11 PROCEDURE — 99999 PR PBB SHADOW E&M-EST. PATIENT-LVL III: ICD-10-PCS | Mod: PBBFAC,,, | Performed by: PODIATRIST

## 2019-03-11 PROCEDURE — 99499 UNLISTED E&M SERVICE: CPT | Mod: S$GLB,,, | Performed by: PODIATRIST

## 2019-03-11 PROCEDURE — 73630 X-RAY EXAM OF FOOT: CPT | Mod: TC,RT

## 2019-03-11 PROCEDURE — 11721 DEBRIDE NAIL 6 OR MORE: CPT | Mod: Q8,S$GLB,, | Performed by: PODIATRIST

## 2019-03-11 PROCEDURE — 99213 OFFICE O/P EST LOW 20 MIN: CPT | Mod: 25,S$GLB,, | Performed by: PODIATRIST

## 2019-03-11 PROCEDURE — 99499 RISK ADDL DX/OHS AUDIT: ICD-10-PCS | Mod: S$GLB,,, | Performed by: PODIATRIST

## 2019-03-11 PROCEDURE — 11721 PR DEBRIDEMENT OF NAILS, 6 OR MORE: ICD-10-PCS | Mod: Q8,S$GLB,, | Performed by: PODIATRIST

## 2019-03-11 PROCEDURE — 73630 XR FOOT COMPLETE 3 VIEW RIGHT: ICD-10-PCS | Mod: 26,RT,, | Performed by: RADIOLOGY

## 2019-03-11 PROCEDURE — 99213 PR OFFICE/OUTPT VISIT, EST, LEVL III, 20-29 MIN: ICD-10-PCS | Mod: 25,S$GLB,, | Performed by: PODIATRIST

## 2019-03-11 PROCEDURE — 3008F BODY MASS INDEX DOCD: CPT | Mod: CPTII,S$GLB,, | Performed by: PODIATRIST

## 2019-03-11 PROCEDURE — 99999 PR PBB SHADOW E&M-EST. PATIENT-LVL III: CPT | Mod: PBBFAC,,, | Performed by: PODIATRIST

## 2019-03-11 PROCEDURE — 73630 X-RAY EXAM OF FOOT: CPT | Mod: 26,RT,, | Performed by: RADIOLOGY

## 2019-03-12 ENCOUNTER — TELEPHONE (OUTPATIENT)
Dept: PODIATRY | Facility: CLINIC | Age: 59
End: 2019-03-12

## 2019-03-12 NOTE — TELEPHONE ENCOUNTER
X-rays and labs reviewed with patient and patient to contact office if any clinical signs of infection are noted.

## 2019-03-19 DIAGNOSIS — M86.671 CHRONIC OSTEOMYELITIS OF TOE OF RIGHT FOOT: Primary | ICD-10-CM

## 2019-03-19 RX ORDER — AMOXICILLIN AND CLAVULANATE POTASSIUM 875; 125 MG/1; MG/1
1 TABLET, FILM COATED ORAL EVERY 12 HOURS
Qty: 20 TABLET | Refills: 1 | Status: SHIPPED | OUTPATIENT
Start: 2019-03-19 | End: 2019-03-29

## 2019-03-24 NOTE — PROGRESS NOTES
Subjective:     Patient ID: Julia Preston is a 58 y.o. female.    Chief Complaint: Toe Pain (Pt c/o pain in R 2nd digit.digit appears to have dark discoloration with crust around the boarders. Diabetic Pt. No noted pain. Pt wearing casual shoes with socks. PCP Dr Currie, no noted time of last visit)    Julia is a 58 y.o. female who presents to the clinic for evaluation and treatment of high risk feet. Julia has a past medical history of Congestive heart failure, Congestive heart failure (2018), Diabetes mellitus, type 2, GERD (gastroesophageal reflux disease), High cholesterol, Hyperlipidemia, Hypertension, Pneumonia (6/2017 or 7/2017 per the patient), and Pneumonia (03/2018). The patient's chief complaint is diabetic right big toe wound and long toenails.  Patient states she noticed the toe has been swelling on and off. Patient also states she has been dressing the toe with Betadine when it swells. Patient denies any drainage from the toe.  This patient has documented high risk feet requiring routine maintenance secondary to diabetes mellitis and those secondary complications of diabetes, as mentioned..    PCP: Idania Currie MD    Date Last Seen by PCP: 9/20/18    Current shoe gear:  Affected Foot: Extra depth shoes     Unaffected Foot: Extra depth shoes    History of Trauma: negative  Sign of Infection: none    Patient Active Problem List   Diagnosis    GERD (gastroesophageal reflux disease)    Type II diabetes mellitus with peripheral circulatory disorder    Hyperlipidemia    Hypertension    PVD (peripheral vascular disease)       Medication List with Changes/Refills   New Medications    AMOXICILLIN-CLAVULANATE 875-125MG (AUGMENTIN) 875-125 MG PER TABLET    Take 1 tablet by mouth every 12 (twelve) hours. for 10 days   Current Medications    ACCU-CHEK SIOMARA PLUS TEST STRP STRP        ACCU-CHEK SOFTCLIX LANCETS MISC        AMLODIPINE (NORVASC) 10 MG TABLET    Take 10 mg by mouth once daily.     ASPIRIN (ECOTRIN) 81 MG EC TABLET    Take 81 mg by mouth once daily.    ATORVASTATIN (LIPITOR) 80 MG TABLET    Take 80 mg by mouth once daily.    BD ALCOHOL SWABS PADM        CARVEDILOL (COREG) 12.5 MG TABLET        CLONIDINE (CATAPRES) 0.1 MG TABLET    Take 0.1 mg by mouth.    CLOPIDOGREL (PLAVIX) 75 MG TABLET    Take 75 mg by mouth once daily.    DOXYCYCLINE (VIBRAMYCIN) 100 MG CAP    Take 1 capsule (100 mg total) by mouth 2 (two) times daily.    ESOMEPRAZOLE MAGNESIUM (NEXIUM ORAL)    Take by mouth.    FUROSEMIDE (LASIX) 20 MG TABLET        GABAPENTIN (NEURONTIN) 300 MG CAPSULE    Take 1 capsule (300 mg total) by mouth 2 (two) times daily.    HYDRALAZINE (APRESOLINE) 100 MG TABLET        INSULIN ASPART PROTAMINE-INSULIN ASPART (NOVOLOG 70/30) 100 UNIT/ML (70-30) INPN PEN    Inject 40 Units into the skin every morning.    NIFEDIPINE (PROCARDIA XL) 60 MG (OSM) 24 HR TABLET    Take 60 mg by mouth once daily.    NITROGLYCERIN (NITROSTAT) 0.4 MG SL TABLET    Place 0.4 mg under the tongue every 5 (five) minutes as needed for Chest pain.    NOVOLIN 70/30 U-100 INSULIN 100 UNIT/ML (70-30) INJECTION        NOVOLOG MIX 70-30 U-100 INSULN 100 UNIT/ML (70-30) SOLN        PANTOPRAZOLE (PROTONIX) 40 MG TABLET    Take 40 mg by mouth once daily.    VITAMIN B-12 100 MCG TABLET           Review of patient's allergies indicates:   Allergen Reactions    Lortab [hydrocodone-acetaminophen] Itching       Past Surgical History:   Procedure Laterality Date    BREAST BIOPSY      CARDIAC SURGERY  2013     SECTION      HYSTERECTOMY         Family History   Problem Relation Age of Onset    Hypertension Mother     Diabetes Mother     Hypertension Brother     Diabetes Brother     Seizures Son        Social History     Socioeconomic History    Marital status:      Spouse name: Not on file    Number of children: Not on file    Years of education: Not on file    Highest education level: Not on file   Occupational  "History    Not on file   Social Needs    Financial resource strain: Not on file    Food insecurity:     Worry: Not on file     Inability: Not on file    Transportation needs:     Medical: Not on file     Non-medical: Not on file   Tobacco Use    Smoking status: Never Smoker    Smokeless tobacco: Never Used   Substance and Sexual Activity    Alcohol use: No    Drug use: No    Sexual activity: Yes     Partners: Male   Lifestyle    Physical activity:     Days per week: Not on file     Minutes per session: Not on file    Stress: Not on file   Relationships    Social connections:     Talks on phone: Not on file     Gets together: Not on file     Attends Confucianism service: Not on file     Active member of club or organization: Not on file     Attends meetings of clubs or organizations: Not on file     Relationship status: Not on file    Intimate partner violence:     Fear of current or ex partner: Not on file     Emotionally abused: Not on file     Physically abused: Not on file     Forced sexual activity: Not on file   Other Topics Concern    Not on file   Social History Narrative    Not on file       Vitals:    03/11/19 0818   BP: (!) 113/47   Weight: 107.6 kg (237 lb 3.4 oz)   Height: 5' 6" (1.676 m)   PainSc: 0-No pain       Review of Systems   Constitutional: Negative for chills and fever.   Respiratory: Negative for shortness of breath.    Cardiovascular: Negative for chest pain, palpitations, orthopnea, claudication and leg swelling.   Gastrointestinal: Negative for diarrhea, nausea and vomiting.   Musculoskeletal: Negative for joint pain.   Skin: Negative for rash.   Neurological: Positive for sensory change. Negative for dizziness, tingling, focal weakness and weakness.   Psychiatric/Behavioral: Negative.              Objective:      PHYSICAL EXAM: Apperance: Alert and orient in no distress,well developed, and with good attention to grooming and body habits  Patient presents ambulating in extra " depth shoes.   Lower Extremity Exam  VASCULAR: Dorsalis pedis pulses 0/4(monphasic with doppler) bilateral and Posterior Tibial pulses 1/4 bilateral. Capillary fill time <4 seconds bilateral. No edema observed bilateral. Varicosities present bilateral. Skin temperature of the lower extremities is warm to cool, proximal to distal. Hair growth absent bilateral. (--) lymphangitis or (--) cellulitis noted right.  DERMATOLOGICAL: (-) edema, (-) erythema, (-) malodor, (-) drainage, (-) warmth to right foot.  Previous ulcer noted to right plantar hallux closed with pink epithealized tissue. The dorsum surface of the feet are soft and supple.  The plantar aspects of feet are dry and scaly. Webspaces 1,2,3,4 clean, dry and without evidence of break in skin integrity bilateral. Nails 1,2,3,4,5 bilateral elongated, thickened, and discolored with subungual debris.  NEUROLOGICAL: Light touch, sharp-dull, proprioception all present and equal bilaterally.  Vibratory sensation diminished at bilateral hallux. Protective sensation absent at 4/10 sites as tested with a [place]-Brandy 5.07 monofilament.   MUSCULOSKELETAL: Muscle strength is 5/5 for foot inverters, everters, plantarflexors, and dorsiflexors. Muscle tone is normal.           Assessment:       Encounter Diagnoses   Name Primary?    Pre-ulcerative calluses - Right Foot Yes    Type II diabetes mellitus with neurological manifestations     PVD (peripheral vascular disease)     Dermatophytosis of nail          Plan:   Pre-ulcerative calluses - Right Foot  -     X-Ray Foot Complete Right; Future; Expected date: 03/11/2019  -     CBC auto differential; Future; Expected date: 03/11/2019  -     Sedimentation rate; Future; Expected date: 03/11/2019  -     C-reactive protein; Future; Expected date: 03/11/2019  -     Procalcitonin; Future; Expected date: 03/11/2019    Type II diabetes mellitus with neurological manifestations    PVD (peripheral vascular  disease)    Dermatophytosis of nail      I counseled the patient on her conditions, regarding findings of my examination, my impressions, and usual treatment plan.   Greater than 15 minutes of a 20 minute appointment spent on education about the diabetic foot, neuropathy, and prevention of limb loss.  Shoe inspection. Diabetic Foot Education. Patient reminded of the importance of good nutrition and blood sugar control to help prevent podiatric complications of diabetes. Patient instructed on proper foot hygeine. We discussed wearing proper shoe gear, daily foot inspections, never walking without protective shoe gear, never putting sharp instruments to feet.    With patient's permission, nails 1-5 bilateral were debrided/trimmed in length and thickness aggressively to their soft tissue attachment mechanically and with electric , removing all offending nail and debris. Patient relates relief following the procedure.  Discussed with patient in detail the treatment options for infected ulcer with bone infection, including (1) local wound care with IV antibiotic for 6-8 weeks, or (2) surgical excision(amputation) of infected bone. Risk for treatments including further limb loss, delayed healing, non-healing was also discussed. Patient states she understands both treatment options and would like to proceed with further testing.   Ordered right foot x-rays, cbc, procalcitonin, crp, and esr testing to be completed today.    Patient to return in 1 month or sooner if needed.             Viet Muir DPM  [facility]

## 2019-03-25 NOTE — PROGRESS NOTES
Patient, Julia Preston (MRN #[id number]), presented with a recorded BMI of 38.29 kg/m^2 and a documented comorbidity(s):  - Diabetes Mellitus Type 2  to which the severe obesity is a contributing factor. This is consistent with the definition of severe obesity (BMI 35.0-39.9) with comorbidity (ICD-10 E66.01, Z68.35). The patient's severe obesity was monitored, evaluated, addressed and/or treated. This addendum to the medical record is made on 03/24/2019.

## 2019-04-11 ENCOUNTER — OFFICE VISIT (OUTPATIENT)
Dept: PODIATRY | Facility: CLINIC | Age: 59
End: 2019-04-11
Payer: MEDICARE

## 2019-04-11 ENCOUNTER — HOSPITAL ENCOUNTER (OUTPATIENT)
Dept: RADIOLOGY | Facility: HOSPITAL | Age: 59
Discharge: HOME | End: 2019-04-11
Attending: PODIATRIST
Payer: MEDICARE

## 2019-04-11 VITALS
HEART RATE: 59 BPM | BODY MASS INDEX: 38.25 KG/M2 | WEIGHT: 237 LBS | DIASTOLIC BLOOD PRESSURE: 55 MMHG | SYSTOLIC BLOOD PRESSURE: 127 MMHG

## 2019-04-11 DIAGNOSIS — M86.671 CHRONIC OSTEOMYELITIS OF TOE OF RIGHT FOOT: Primary | ICD-10-CM

## 2019-04-11 DIAGNOSIS — M86.671 CHRONIC OSTEOMYELITIS OF TOE OF RIGHT FOOT: ICD-10-CM

## 2019-04-11 PROCEDURE — 99213 PR OFFICE/OUTPT VISIT, EST, LEVL III, 20-29 MIN: ICD-10-PCS | Mod: S$GLB,,, | Performed by: PODIATRIST

## 2019-04-11 PROCEDURE — 3008F PR BODY MASS INDEX (BMI) DOCUMENTED: ICD-10-PCS | Mod: CPTII,S$GLB,, | Performed by: PODIATRIST

## 2019-04-11 PROCEDURE — 3008F BODY MASS INDEX DOCD: CPT | Mod: CPTII,S$GLB,, | Performed by: PODIATRIST

## 2019-04-11 PROCEDURE — 73630 XR FOOT COMPLETE 3 VIEW RIGHT: ICD-10-PCS | Mod: 26,RT,, | Performed by: RADIOLOGY

## 2019-04-11 PROCEDURE — 99999 PR PBB SHADOW E&M-EST. PATIENT-LVL II: ICD-10-PCS | Mod: PBBFAC,,, | Performed by: PODIATRIST

## 2019-04-11 PROCEDURE — 99213 OFFICE O/P EST LOW 20 MIN: CPT | Mod: S$GLB,,, | Performed by: PODIATRIST

## 2019-04-11 PROCEDURE — 99999 PR PBB SHADOW E&M-EST. PATIENT-LVL II: CPT | Mod: PBBFAC,,, | Performed by: PODIATRIST

## 2019-04-11 PROCEDURE — 73630 X-RAY EXAM OF FOOT: CPT | Mod: 26,RT,, | Performed by: RADIOLOGY

## 2019-04-11 PROCEDURE — 73630 X-RAY EXAM OF FOOT: CPT | Mod: TC,RT

## 2019-04-22 NOTE — PROGRESS NOTES
Subjective:     Patient ID: Julia Preston is a 58 y.o. female.    Chief Complaint: Follow-up (right 2nd toe discolration. swelling. wears tennis shoes with socks. diabetic Pt. last seen on 4/1/19 with PCP Dr. Currie.)    Julia is a 58 y.o. female who presents to the clinic for evaluation and treatment of high risk feet. Julia has a past medical history of Congestive heart failure, Congestive heart failure (2018), Diabetes mellitus, type 2, GERD (gastroesophageal reflux disease), High cholesterol, Hyperlipidemia, Hypertension, Pneumonia (6/2017 or 7/2017 per the patient), and Pneumonia (03/2018). The patient's chief complaint is diabetic right big toe wound and long toenails.  Patient states she noticed the toe has been swelling on and off. Patient also states she has been dressing the toe with Betadine when it swells. Patient denies any drainage from the toe.  This patient has documented high risk feet requiring routine maintenance secondary to diabetes mellitis and those secondary complications of diabetes, as mentioned..    PCP: Idania Currie MD    Date Last Seen by PCP: 9/20/18    Current shoe gear:  Affected Foot: Extra depth shoes     Unaffected Foot: Extra depth shoes    History of Trauma: negative  Sign of Infection: none    Patient Active Problem List   Diagnosis    GERD (gastroesophageal reflux disease)    Type II diabetes mellitus with peripheral circulatory disorder    Hyperlipidemia    Hypertension    PVD (peripheral vascular disease)       Medication List with Changes/Refills   Current Medications    ACCU-CHEK SIOMARA PLUS TEST STRP STRP        ACCU-CHEK SOFTCLIX LANCETS MISC        AMLODIPINE (NORVASC) 10 MG TABLET    Take 10 mg by mouth once daily.    ASPIRIN (ECOTRIN) 81 MG EC TABLET    Take 81 mg by mouth once daily.    ATORVASTATIN (LIPITOR) 80 MG TABLET    Take 80 mg by mouth once daily.    BD ALCOHOL SWABS PADM        CARVEDILOL (COREG) 12.5 MG TABLET        CLONIDINE (CATAPRES)  0.1 MG TABLET    Take 0.1 mg by mouth.    CLOPIDOGREL (PLAVIX) 75 MG TABLET    Take 75 mg by mouth once daily.    DOXYCYCLINE (VIBRAMYCIN) 100 MG CAP    Take 1 capsule (100 mg total) by mouth 2 (two) times daily.    ESOMEPRAZOLE MAGNESIUM (NEXIUM ORAL)    Take by mouth.    FUROSEMIDE (LASIX) 20 MG TABLET        GABAPENTIN (NEURONTIN) 300 MG CAPSULE    Take 1 capsule (300 mg total) by mouth 2 (two) times daily.    HYDRALAZINE (APRESOLINE) 100 MG TABLET        INSULIN ASPART PROTAMINE-INSULIN ASPART (NOVOLOG 70/30) 100 UNIT/ML (70-30) INPN PEN    Inject 40 Units into the skin every morning.    NIFEDIPINE (PROCARDIA XL) 60 MG (OSM) 24 HR TABLET    Take 60 mg by mouth once daily.    NITROGLYCERIN (NITROSTAT) 0.4 MG SL TABLET    Place 0.4 mg under the tongue every 5 (five) minutes as needed for Chest pain.    NOVOLIN 70/30 U-100 INSULIN 100 UNIT/ML (70-30) INJECTION        NOVOLOG MIX 70-30 U-100 INSULN 100 UNIT/ML (70-30) SOLN        PANTOPRAZOLE (PROTONIX) 40 MG TABLET    Take 40 mg by mouth once daily.    VITAMIN B-12 100 MCG TABLET           Review of patient's allergies indicates:   Allergen Reactions    Lortab [hydrocodone-acetaminophen] Itching       Past Surgical History:   Procedure Laterality Date    BREAST BIOPSY      CARDIAC SURGERY  2013     SECTION      HYSTERECTOMY         Family History   Problem Relation Age of Onset    Hypertension Mother     Diabetes Mother     Hypertension Brother     Diabetes Brother     Seizures Son        Social History     Socioeconomic History    Marital status:      Spouse name: Not on file    Number of children: Not on file    Years of education: Not on file    Highest education level: Not on file   Occupational History    Not on file   Social Needs    Financial resource strain: Not on file    Food insecurity:     Worry: Not on file     Inability: Not on file    Transportation needs:     Medical: Not on file     Non-medical: Not on file    Tobacco Use    Smoking status: Never Smoker    Smokeless tobacco: Never Used   Substance and Sexual Activity    Alcohol use: No    Drug use: No    Sexual activity: Yes     Partners: Male   Lifestyle    Physical activity:     Days per week: Not on file     Minutes per session: Not on file    Stress: Not on file   Relationships    Social connections:     Talks on phone: Not on file     Gets together: Not on file     Attends Mosque service: Not on file     Active member of club or organization: Not on file     Attends meetings of clubs or organizations: Not on file     Relationship status: Not on file   Other Topics Concern    Not on file   Social History Narrative    Not on file       Vitals:    04/11/19 0836   BP: (!) 127/55   Pulse: (!) 59   Weight: 107.5 kg (237 lb)       Review of Systems   Constitutional: Negative for chills and fever.   Respiratory: Negative for shortness of breath.    Cardiovascular: Negative for chest pain, palpitations, orthopnea, claudication and leg swelling.   Gastrointestinal: Negative for diarrhea, nausea and vomiting.   Musculoskeletal: Negative for joint pain.   Skin: Negative for rash.   Neurological: Positive for sensory change. Negative for dizziness, tingling, focal weakness and weakness.   Psychiatric/Behavioral: Negative.              Objective:      PHYSICAL EXAM: Apperance: Alert and orient in no distress,well developed, and with good attention to grooming and body habits  Patient presents ambulating in extra depth shoes.   Lower Extremity Exam  VASCULAR: Dorsalis pedis pulses 0/4(monphasic with doppler) bilateral and Posterior Tibial pulses 1/4 bilateral. Capillary fill time <4 seconds bilateral. No edema observed bilateral. Varicosities present bilateral. Skin temperature of the lower extremities is warm to cool, proximal to distal. Hair growth absent bilateral. (--) lymphangitis or (--) cellulitis noted right.  DERMATOLOGICAL: (-) edema, (-) erythema, (-)  malodor, (-) drainage, (-) warmth to right foot.  Previous ulcer noted to right plantar hallux closed with pink epithealized tissue. The dorsum surface of the feet are soft and supple.  The plantar aspects of feet are dry and scaly. Webspaces 1,2,3,4 clean, dry and without evidence of break in skin integrity bilateral. Nails 1,2,3,4,5 bilateral elongated, thickened, and discolored with subungual debris.  NEUROLOGICAL: Light touch, sharp-dull, proprioception all present and equal bilaterally.  Vibratory sensation diminished at bilateral hallux. Protective sensation absent at 4/10 sites as tested with a [place]-Brandy 5.07 monofilament.   MUSCULOSKELETAL: Muscle strength is 5/5 for foot inverters, everters, plantarflexors, and dorsiflexors. Muscle tone is normal.           Assessment:       Encounter Diagnosis   Name Primary?    Chronic osteomyelitis of toe of right foot Yes         Plan:   Chronic osteomyelitis of toe of right foot  -     X-Ray Foot Complete Right; Future; Expected date: 04/11/2019      I counseled the patient on her conditions, regarding findings of my examination, my impressions, and usual treatment plan.   Greater than 15 minutes of a 20 minute appointment spent on education about the diabetic foot, neuropathy, and prevention of limb loss.  Shoe inspection. Diabetic Foot Education. Patient reminded of the importance of good nutrition and blood sugar control to help prevent podiatric complications of diabetes. Patient instructed on proper foot hygeine. We discussed wearing proper shoe gear, daily foot inspections, never walking without protective shoe gear, never putting sharp instruments to feet.    With patient's permission, nails 1-5 bilateral were debrided/trimmed in length and thickness aggressively to their soft tissue attachment mechanically and with electric , removing all offending nail and debris. Patient relates relief following the procedure.  Discussed with patient in detail  the treatment options for infected ulcer with bone infection, including (1) local wound care with IV antibiotic for 6-8 weeks, or (2) surgical excision(amputation) of infected bone. Risk for treatments including further limb loss, delayed healing, non-healing was also discussed. Patient states she understands both treatment options and would like to proceed with further testing.   Ordered right foot x-rays, cbc, procalcitonin, crp, and esr testing to be completed today.    Patient to return in 1 month or sooner if needed.             Viet Muir DPM  [facility]

## 2019-06-14 ENCOUNTER — OFFICE VISIT (OUTPATIENT)
Dept: PODIATRY | Facility: CLINIC | Age: 59
End: 2019-06-14
Payer: MEDICARE

## 2019-06-14 ENCOUNTER — HOSPITAL ENCOUNTER (OUTPATIENT)
Dept: RADIOLOGY | Facility: HOSPITAL | Age: 59
Discharge: HOME | End: 2019-06-14
Attending: PODIATRIST
Payer: MEDICARE

## 2019-06-14 VITALS
DIASTOLIC BLOOD PRESSURE: 60 MMHG | HEART RATE: 60 BPM | HEIGHT: 66 IN | SYSTOLIC BLOOD PRESSURE: 128 MMHG | WEIGHT: 229.94 LBS | BODY MASS INDEX: 36.95 KG/M2

## 2019-06-14 DIAGNOSIS — I73.9 PVD (PERIPHERAL VASCULAR DISEASE): ICD-10-CM

## 2019-06-14 DIAGNOSIS — M86.671 CHRONIC OSTEOMYELITIS OF TOE OF RIGHT FOOT: Primary | ICD-10-CM

## 2019-06-14 DIAGNOSIS — M86.671 CHRONIC OSTEOMYELITIS OF TOE OF RIGHT FOOT: ICD-10-CM

## 2019-06-14 DIAGNOSIS — E11.49 TYPE II DIABETES MELLITUS WITH NEUROLOGICAL MANIFESTATIONS: ICD-10-CM

## 2019-06-14 DIAGNOSIS — L97.512 SKIN ULCER OF TOE OF RIGHT FOOT WITH FAT LAYER EXPOSED: ICD-10-CM

## 2019-06-14 DIAGNOSIS — B35.1 DERMATOPHYTOSIS OF NAIL: ICD-10-CM

## 2019-06-14 PROCEDURE — 99999 PR PBB SHADOW E&M-EST. PATIENT-LVL III: ICD-10-PCS | Mod: PBBFAC,,, | Performed by: PODIATRIST

## 2019-06-14 PROCEDURE — 99213 OFFICE O/P EST LOW 20 MIN: CPT | Mod: 25,S$GLB,, | Performed by: PODIATRIST

## 2019-06-14 PROCEDURE — 99213 PR OFFICE/OUTPT VISIT, EST, LEVL III, 20-29 MIN: ICD-10-PCS | Mod: 25,S$GLB,, | Performed by: PODIATRIST

## 2019-06-14 PROCEDURE — 73630 X-RAY EXAM OF FOOT: CPT | Mod: TC,RT

## 2019-06-14 PROCEDURE — 73630 X-RAY EXAM OF FOOT: CPT | Mod: 26,RT,, | Performed by: RADIOLOGY

## 2019-06-14 PROCEDURE — 3008F BODY MASS INDEX DOCD: CPT | Mod: CPTII,S$GLB,, | Performed by: PODIATRIST

## 2019-06-14 PROCEDURE — 11042 DBRDMT SUBQ TIS 1ST 20SQCM/<: CPT | Mod: S$GLB,,, | Performed by: PODIATRIST

## 2019-06-14 PROCEDURE — 3008F PR BODY MASS INDEX (BMI) DOCUMENTED: ICD-10-PCS | Mod: CPTII,S$GLB,, | Performed by: PODIATRIST

## 2019-06-14 PROCEDURE — 73630 XR FOOT COMPLETE 3 VIEW RIGHT: ICD-10-PCS | Mod: 26,RT,, | Performed by: RADIOLOGY

## 2019-06-14 PROCEDURE — 11721 PR DEBRIDEMENT OF NAILS, 6 OR MORE: ICD-10-PCS | Mod: 59,Q9,S$GLB, | Performed by: PODIATRIST

## 2019-06-14 PROCEDURE — 11721 DEBRIDE NAIL 6 OR MORE: CPT | Mod: 59,Q9,S$GLB, | Performed by: PODIATRIST

## 2019-06-14 PROCEDURE — 11042 PR DEBRIDEMENT, SKIN, SUB-Q TISSUE,=<20 SQ CM: ICD-10-PCS | Mod: S$GLB,,, | Performed by: PODIATRIST

## 2019-06-14 PROCEDURE — 99999 PR PBB SHADOW E&M-EST. PATIENT-LVL III: CPT | Mod: PBBFAC,,, | Performed by: PODIATRIST

## 2019-06-16 NOTE — PROGRESS NOTES
Subjective:     Patient ID: Julia Preston is a 58 y.o. female.    Chief Complaint: Nail Care (RNC. No noted pain. Diabetic pt. Pt wearing tennis shoes w/ socks. PCP DR Currie, last seen March 2019. )    Julia is a 58 y.o. female who presents to the clinic for evaluation and treatment of high risk feet. Julia has a past medical history of Congestive heart failure, Congestive heart failure (2018), Diabetes mellitus, type 2, GERD (gastroesophageal reflux disease), High cholesterol, Hyperlipidemia, Hypertension, Pneumonia (6/2017 or 7/2017 per the patient), and Pneumonia (03/2018). The patient's chief complaint is diabetic right big toe wound and long toenails.Patient states she canceled last appointment due to weather. Patient denies any drainage from the right foot. Patient states she noticed the toe has been swelling on and off. This patient has documented high risk feet requiring routine maintenance secondary to diabetes mellitis and those secondary complications of diabetes, as mentioned..    PCP: Idania Currie MD    Date Last Seen by PCP: 3/12/19    Current shoe gear:  Affected Foot: Extra depth shoes     Unaffected Foot: Extra depth shoes    History of Trauma: negative  Sign of Infection: none    Patient Active Problem List   Diagnosis    GERD (gastroesophageal reflux disease)    Type II diabetes mellitus with peripheral circulatory disorder    Hyperlipidemia    Hypertension    PVD (peripheral vascular disease)       Medication List with Changes/Refills   Current Medications    ACCU-CHEK SIOMARA PLUS TEST STRP STRP        ACCU-CHEK SOFTCLIX LANCETS MISC        AMLODIPINE (NORVASC) 10 MG TABLET    Take 10 mg by mouth once daily.    ASPIRIN (ECOTRIN) 81 MG EC TABLET    Take 81 mg by mouth once daily.    ATORVASTATIN (LIPITOR) 80 MG TABLET    Take 80 mg by mouth once daily.    BD ALCOHOL SWABS PADM        CARVEDILOL (COREG) 12.5 MG TABLET        CLONIDINE (CATAPRES) 0.1 MG TABLET    Take 0.1 mg by  mouth.    CLOPIDOGREL (PLAVIX) 75 MG TABLET    Take 75 mg by mouth once daily.    DOXYCYCLINE (VIBRAMYCIN) 100 MG CAP    Take 1 capsule (100 mg total) by mouth 2 (two) times daily.    ESOMEPRAZOLE MAGNESIUM (NEXIUM ORAL)    Take by mouth.    FUROSEMIDE (LASIX) 20 MG TABLET        GABAPENTIN (NEURONTIN) 300 MG CAPSULE    Take 1 capsule (300 mg total) by mouth 2 (two) times daily.    HYDRALAZINE (APRESOLINE) 100 MG TABLET        INSULIN ASPART PROTAMINE-INSULIN ASPART (NOVOLOG 70/30) 100 UNIT/ML (70-30) INPN PEN    Inject 40 Units into the skin every morning.    NIFEDIPINE (PROCARDIA XL) 60 MG (OSM) 24 HR TABLET    Take 60 mg by mouth once daily.    NITROGLYCERIN (NITROSTAT) 0.4 MG SL TABLET    Place 0.4 mg under the tongue every 5 (five) minutes as needed for Chest pain.    NOVOLIN 70/30 U-100 INSULIN 100 UNIT/ML (70-30) INJECTION        NOVOLOG MIX 70-30 U-100 INSULN 100 UNIT/ML (70-30) SOLN        PANTOPRAZOLE (PROTONIX) 40 MG TABLET    Take 40 mg by mouth once daily.    VITAMIN B-12 100 MCG TABLET           Review of patient's allergies indicates:   Allergen Reactions    Lortab [hydrocodone-acetaminophen] Itching       Past Surgical History:   Procedure Laterality Date    BREAST BIOPSY      CARDIAC SURGERY  2013     SECTION      HYSTERECTOMY         Family History   Problem Relation Age of Onset    Hypertension Mother     Diabetes Mother     Hypertension Brother     Diabetes Brother     Seizures Son        Social History     Socioeconomic History    Marital status:      Spouse name: Not on file    Number of children: Not on file    Years of education: Not on file    Highest education level: Not on file   Occupational History    Not on file   Social Needs    Financial resource strain: Not on file    Food insecurity:     Worry: Not on file     Inability: Not on file    Transportation needs:     Medical: Not on file     Non-medical: Not on file   Tobacco Use    Smoking status: Never  "Smoker    Smokeless tobacco: Never Used   Substance and Sexual Activity    Alcohol use: No    Drug use: No    Sexual activity: Yes     Partners: Male   Lifestyle    Physical activity:     Days per week: Not on file     Minutes per session: Not on file    Stress: Not on file   Relationships    Social connections:     Talks on phone: Not on file     Gets together: Not on file     Attends Pentec[employer]al service: Not on file     Active member of club or organization: Not on file     Attends meetings of clubs or organizations: Not on file     Relationship status: Not on file   Other Topics Concern    Not on file   Social History Narrative    Not on file       Vitals:    06/14/19 0932   BP: 128/60   Pulse: 60   Weight: 104.3 kg (229 lb 15 oz)   Height: 5' 6" (1.676 m)   PainSc: 0-No pain       Review of Systems   Constitutional: Negative for chills and fever.   Respiratory: Negative for shortness of breath.    Cardiovascular: Negative for chest pain, palpitations, orthopnea, claudication and leg swelling.   Gastrointestinal: Negative for diarrhea, nausea and vomiting.   Musculoskeletal: Negative for joint pain.   Skin: Negative for rash.   Neurological: Positive for sensory change. Negative for dizziness, tingling, focal weakness and weakness.   Psychiatric/Behavioral: Negative.              Objective:      PHYSICAL EXAM: Apperance: Alert and orient in no distress,well developed, and with good attention to grooming and body habits  Patient presents ambulating in extra depth shoes.   Lower Extremity Exam  VASCULAR: Dorsalis pedis pulses 0/4(monphasic with doppler) bilateral and Posterior Tibial pulses 1/4 bilateral. Capillary fill time <4 seconds bilateral. No edema observed bilateral. Varicosities present bilateral. Skin temperature of the lower extremities is warm to cool, proximal to distal. Hair growth absent bilateral. (--) lymphangitis or (--) cellulitis noted right.Darkened hyperpigmentation noted to right " forefoot.   DERMATOLOGICAL: (+) edema, (--) erythema, (--) malodor, (--) drainage, (--) warmth to right foot.  Ulcer noted to plantar right 2nd toe  with granular base and with a 1 mm yellow/white border and hyperkeratosis surrounding ulcer. Ulcer measurement (pre) 0.3cm x 0.3cm x 0.1cm (post) 0.5cm x 0.5cm x 0.1cm.  The ulcer does not extend into deeper tissue and (--) sinus tracts exist.  The dorsum surface of the feet are soft and supple.  The plantar aspects of feet are dry and scaly. Webspaces 1,2,3,4 clean, dry and without evidence of break in skin integrity bilateral. Nails 1,2,3,4,5 bilateral elongated, thickened, and discolored with subungual debris.  NEUROLOGICAL: Light touch, sharp-dull, proprioception all present and equal bilaterally.  Vibratory sensation diminished at bilateral hallux. Protective sensation absent at 4/10 sites as tested with a [place]-Brandy 5.07 monofilament.   MUSCULOSKELETAL: Muscle strength is 5/5 for foot inverters, everters, plantarflexors, and dorsiflexors. Muscle tone is normal.           Assessment:       Encounter Diagnoses   Name Primary?    Chronic osteomyelitis of toe of right foot Yes    Skin ulcer of toe of right foot with fat layer exposed     PVD (peripheral vascular disease)     Type II diabetes mellitus with neurological manifestations     Dermatophytosis of nail          Plan:   Chronic osteomyelitis of toe of right foot  -     US Lower Extrem Arteries Bilat with GEETHA; Future; Expected date: 06/15/2019    Skin ulcer of toe of right foot with fat layer exposed  -     US Lower Extrem Arteries Bilat with GEETHA; Future; Expected date: 06/15/2019    PVD (peripheral vascular disease)  -     US Lower Extrem Arteries Bilat with GEETHA; Future; Expected date: 06/15/2019    Type II diabetes mellitus with neurological manifestations    Dermatophytosis of nail      I counseled the patient on her conditions, regarding findings of my examination, my impressions, and usual  treatment plan.   Greater than 15 minutes of a 20 minute appointment spent on education about the diabetic foot, neuropathy, and prevention of limb loss.  Shoe inspection. Diabetic Foot Education. Patient reminded of the importance of good nutrition and blood sugar control to help prevent podiatric complications of diabetes. Patient instructed on proper foot hygeine. We discussed wearing proper shoe gear, daily foot inspections, never walking without protective shoe gear, never putting sharp instruments to feet.    With patient's permission, nails 1-5 bilateral were debrided/trimmed in length and thickness aggressively to their soft tissue attachment mechanically and with electric , removing all offending nail and debris. Patient relates relief following the procedure.  Discussed with patient in detail the treatment options for infected ulcer with bone infection, including (1) local wound care with IV antibiotic for 6-8 weeks, or (2) surgical excision(amputation) of infected bone. Risk for treatments including further limb loss, delayed healing, non-healing was also discussed. Patient states she understands both treatment options and would like to proceed with further testing.   With patient's permission, the right 2nd toe wound was sharply excisionally debrided of viable and nonviable tissue from the wound periphery and base. I redefined the wound borders in the process. Debridement was carried into and did include the subcutaneous layer down to good bleeding granular tissue base utilizing sterile #15 blade and tissue nipper and forceps. Wound was irrigated with sterile saline and bleeding was controlled with direct pressure. Minimal blood loss.  Post debridement measurements are contained in the above progress note. The patient tolerated this well. Wound was then dressed with Betadine, gauze, and papertape. Patient was given instructions on daily dressing changes. Patient was also instructed on the  importance of keeping the wound and dressings clean dry and intact and keeping pressure off the wound area until complete healing of the wound.The patient is alerted to watch for any signs of infection (redness, pus, pain, increased swelling or fever) and call if such occurs. Home wound care instructions are provided.  Ordered lower extremity ultrasounds to rule out PVD due to increased darkened discoloration of right forefoot  Ordered right foot x-rays to be completed today.    Patient to return when arterial ultrasound is completed or sooner if needed.             Viet Muir DPM  [facility]

## 2019-06-17 ENCOUNTER — TELEPHONE (OUTPATIENT)
Dept: PODIATRY | Facility: CLINIC | Age: 59
End: 2019-06-17

## 2019-06-21 ENCOUNTER — HOSPITAL ENCOUNTER (OUTPATIENT)
Dept: RADIOLOGY | Facility: HOSPITAL | Age: 59
Discharge: HOME | End: 2019-06-21
Attending: PODIATRIST
Payer: MEDICARE

## 2019-06-21 DIAGNOSIS — M86.671 CHRONIC OSTEOMYELITIS OF TOE OF RIGHT FOOT: ICD-10-CM

## 2019-06-21 DIAGNOSIS — I73.9 PVD (PERIPHERAL VASCULAR DISEASE): ICD-10-CM

## 2019-06-21 DIAGNOSIS — L97.512 SKIN ULCER OF TOE OF RIGHT FOOT WITH FAT LAYER EXPOSED: ICD-10-CM

## 2019-06-21 PROCEDURE — 93925 LOWER EXTREMITY STUDY: CPT | Mod: 26,,, | Performed by: RADIOLOGY

## 2019-06-21 PROCEDURE — 93925 LOWER EXTREMITY STUDY: CPT | Mod: TC

## 2019-06-21 PROCEDURE — 93925 US LOWER EXTREMITY ARTERIES BILATERAL: ICD-10-PCS | Mod: 26,,, | Performed by: RADIOLOGY

## 2019-06-24 ENCOUNTER — TELEPHONE (OUTPATIENT)
Dept: PODIATRY | Facility: CLINIC | Age: 59
End: 2019-06-24

## 2019-06-24 NOTE — TELEPHONE ENCOUNTER
Spoke w/ Pt about setting an appt w/ vascular. Pt has an appt w/ Dr Alvarenga on June 26, 2019 at 11:20am at the [facility] location. Pt agrred to all terms, gas her the directions to facility.

## 2019-07-01 ENCOUNTER — TELEPHONE (OUTPATIENT)
Dept: PODIATRY | Facility: CLINIC | Age: 59
End: 2019-07-01

## 2019-07-01 NOTE — TELEPHONE ENCOUNTER
Tried following up with Pt in regards to foot. Left detailed message for Pt to contact me back. Will try again at a later time.

## 2019-07-01 NOTE — TELEPHONE ENCOUNTER
----- Message from Vite Muir DPM sent at 6/30/2019  9:11 PM CDT -----  Please follow up with office noted form Dr. Alvarenga's office. Contact patient and schedule her for the week I get back and ask how foot is. If worsened see if Dr. Toro can see her this week.

## 2019-07-02 ENCOUNTER — OFFICE VISIT (OUTPATIENT)
Dept: PODIATRY | Facility: CLINIC | Age: 59
End: 2019-07-02
Payer: MEDICARE

## 2019-07-02 VITALS
DIASTOLIC BLOOD PRESSURE: 54 MMHG | SYSTOLIC BLOOD PRESSURE: 139 MMHG | HEIGHT: 66 IN | WEIGHT: 229.94 LBS | BODY MASS INDEX: 36.95 KG/M2 | HEART RATE: 60 BPM

## 2019-07-02 DIAGNOSIS — M86.671 CHRONIC OSTEOMYELITIS OF TOE OF RIGHT FOOT: Primary | ICD-10-CM

## 2019-07-02 DIAGNOSIS — L97.514 DIABETIC ULCER OF TOE OF RIGHT FOOT ASSOCIATED WITH TYPE 2 DIABETES MELLITUS, WITH NECROSIS OF BONE: ICD-10-CM

## 2019-07-02 DIAGNOSIS — E11.51 TYPE II DIABETES MELLITUS WITH PERIPHERAL CIRCULATORY DISORDER: ICD-10-CM

## 2019-07-02 DIAGNOSIS — E11.621 DIABETIC ULCER OF TOE OF RIGHT FOOT ASSOCIATED WITH TYPE 2 DIABETES MELLITUS, WITH NECROSIS OF BONE: ICD-10-CM

## 2019-07-02 DIAGNOSIS — E11.49 TYPE II DIABETES MELLITUS WITH NEUROLOGICAL MANIFESTATIONS: ICD-10-CM

## 2019-07-02 DIAGNOSIS — E66.01 SEVERE OBESITY (BMI 35.0-39.9) WITH COMORBIDITY: ICD-10-CM

## 2019-07-02 PROCEDURE — 99214 PR OFFICE/OUTPT VISIT, EST, LEVL IV, 30-39 MIN: ICD-10-PCS | Mod: S$GLB,,, | Performed by: PODIATRIST

## 2019-07-02 PROCEDURE — 3008F BODY MASS INDEX DOCD: CPT | Mod: CPTII,S$GLB,, | Performed by: PODIATRIST

## 2019-07-02 PROCEDURE — 99999 PR PBB SHADOW E&M-EST. PATIENT-LVL III: CPT | Mod: PBBFAC,,, | Performed by: PODIATRIST

## 2019-07-02 PROCEDURE — 3008F PR BODY MASS INDEX (BMI) DOCUMENTED: ICD-10-PCS | Mod: CPTII,S$GLB,, | Performed by: PODIATRIST

## 2019-07-02 PROCEDURE — 99999 PR PBB SHADOW E&M-EST. PATIENT-LVL III: ICD-10-PCS | Mod: PBBFAC,,, | Performed by: PODIATRIST

## 2019-07-02 PROCEDURE — 99214 OFFICE O/P EST MOD 30 MIN: CPT | Mod: S$GLB,,, | Performed by: PODIATRIST

## 2019-07-02 RX ORDER — CIPROFLOXACIN 500 MG/1
500 TABLET ORAL EVERY 12 HOURS
Qty: 14 TABLET | Refills: 0 | Status: SHIPPED | OUTPATIENT
Start: 2019-07-02 | End: 2019-07-09 | Stop reason: SDUPTHER

## 2019-07-02 NOTE — PROGRESS NOTES
"Subjective:       Patient ID: Julia Preston is a 58 y.o. female.    Chief Complaint: Wound Check (2nd digit ulcer check. Ulcer 1x 1/2x no noted drainage w/ noted dark discoloration to toe. Diabetic pt. pt wearing tennis shoes w/ socks. " NO dressing" PCP DR Currie.)      HPI: Patient presents to the clinic today, for follow up and treatment concerning an ulceration to the plantar sulcus of the right 2nd toe. Patient's Primary Care Provider is Idania Currie MD. The PMHx. does include DMII with PVD and peripheral neuropathy. Local woundcare product(s) most recently is/are dry dressing. The patient states the ulcerations been present now for several months. Patient did see vascular surgeon Dr. Bill Alvarenga on yesterday where GEETHA/PVR evaluation was performed. Patient has completed a course of PO ABx. Patient denies local or systemic signs of infection.  Patient presents weight-bearing and ambulating with a sneaker at this time.    No results found for: HGBA1C      Review of patient's allergies indicates:   Allergen Reactions    Lortab [hydrocodone-acetaminophen] Itching       Past Medical History:   Diagnosis Date    Congestive heart failure     Congestive heart failure 2018    Diabetes mellitus, type 2     GERD (gastroesophageal reflux disease)     High cholesterol     Hyperlipidemia     Hypertension     Pneumonia 6/2017 or 7/2017 per the patient    Pneumonia 03/2018       Family History   Problem Relation Age of Onset    Hypertension Mother     Diabetes Mother     Hypertension Brother     Diabetes Brother     Seizures Son        Social History     Socioeconomic History    Marital status:      Spouse name: Not on file    Number of children: Not on file    Years of education: Not on file    Highest education level: Not on file   Occupational History    Not on file   Social Needs    Financial resource strain: Not on file    Food insecurity:     Worry: Not on file     Inability: Not on " "file    Transportation needs:     Medical: Not on file     Non-medical: Not on file   Tobacco Use    Smoking status: Never Smoker    Smokeless tobacco: Never Used   Substance and Sexual Activity    Alcohol use: No    Drug use: No    Sexual activity: Yes     Partners: Male   Lifestyle    Physical activity:     Days per week: Not on file     Minutes per session: Not on file    Stress: Not on file   Relationships    Social connections:     Talks on phone: Not on file     Gets together: Not on file     Attends Oriental orthodox service: Not on file     Active member of club or organization: Not on file     Attends meetings of clubs or organizations: Not on file     Relationship status: Not on file   Other Topics Concern    Not on file   Social History Narrative    Not on file       Past Surgical History:   Procedure Laterality Date    BREAST BIOPSY      CARDIAC SURGERY  2013     SECTION      HYSTERECTOMY         Review of Systems   Constitutional: Negative for chills, fatigue and fever.   HENT: Negative for hearing loss.    Eyes: Negative for photophobia and visual disturbance.   Respiratory: Negative for cough, chest tightness, shortness of breath and wheezing.    Cardiovascular: Negative for chest pain, palpitations and leg swelling.   Gastrointestinal: Negative for constipation, diarrhea, nausea and vomiting.   Endocrine: Negative for cold intolerance and heat intolerance.   Genitourinary: Negative for flank pain.   Musculoskeletal: Positive for gait problem. Negative for neck pain and neck stiffness.   Skin: Positive for color change and wound.   Neurological: Positive for numbness. Negative for light-headedness and headaches.   Psychiatric/Behavioral: Negative for sleep disturbance.          Objective:   BP (!) 139/54 (BP Location: Right arm, Patient Position: Sitting, BP Method: Large (Automatic))   Pulse 60   Ht 5' 6" (1.676 m)   Wt 104.3 kg (229 lb 15 oz)   LMP 10/11/1988 (Approximate)   BMI " 37.11 kg/m²     Radiology US Lower Extremity Arteries Bilateral   Order: [id number]   Status:  Final result   Visible to patient:  No (Not Released) Next appt:  07/09/2019 at 09:20 AM in Podiatry (Viet Muir DPM) Dx:  PVD (peripheral vascular disease); Sk...   Details     Reading Physician Reading Date Result Priority   Jaydon Bhardwaj DO 6/21/2019 Routine      Narrative     EXAMINATION:  US LOWER EXTREMITY ARTERIES BILATERAL    CLINICAL HISTORY:  Other chronic osteomyelitis, right ankle and foot    TECHNIQUE:  Bilateral lower extremity arterial duplex ultrasound.  Multiple grayscale and color Doppler images were obtained in addition to waveform analysis.    COMPARISON:  None.    FINDINGS:  The peak systolic velocities on the right are as follows, and measured in cm/sec:    Common femoral artery: 143    Proximal SFA: 173    Mid SFA: 152    Distal SFA: 100    Popliteal artery: 20    Anterior tibial artery: 66    Peroneal artery: 93    Posterior tibial artery: 56    Dorsalis pedis artery: 115    The peak systolic velocities on the left are as follows, and measured in cm/sec:    Common femoral artery: 157    Proximal SFA: 147    Mid SFA: 138    Distal SFA: 146    Popliteal artery: 105    Anterior tibial artery: 33    Peroneal artery: 51    Posterior tibial artery: 71    Dorsalis pedis artery: 70    Primarily multiphasic waveforms noted within both lower extremities with the exception of significantly decreased velocity and monophasic waveforms noted in the region of the right popliteal artery.  Mild-to-moderate atherosclerotic irregularity noted.      Impression       1. Significantly decreased velocities and monophasic waveforms noted in the region of the right popliteal artery.  Significant stenosis in this region is not excluded.  Consider further evaluation with CTA as clinically warranted.      Electronically signed by: Jaydon Bhardwaj DO  Date: 06/21/2019  Time: 11:27             Last Resulted: 06/21/19  11:27           LOWER EXTREMITY PHYSICAL EXAMINATION  VASCULAR: On the right foot, the dorsalis pedis pulse is 1/4 and the posterior tibial pulse is 0/4. Capillary refill time is less than 3 seconds. Hair growth is absent on the dorsum of the foot and at the digits. No rubor is present. Proximal to distal temperature is warm to warm.    NEUROLOGY: Sensation to light touch is intact. Proprioception is intact, bilateral. Sensation to pin prick is reduced to absent. Vibratory sensation is diminished to the left and right lower extremity. Examination with 5.07 [place] Brandy monofilament reveals that protective sensation is not intact to the left and right plantar surfaces of the foot and digits, as the patient has no sensation/detection at greater than 4 distinct points of contact.    ORTHOPEDIC: Manual Muscle Testing is 5/5 in all planes on the right, without pains, with and without resistance. No pains to palpation of the medial or lateral ankle ligaments. No discomfort to palpation of the posterior tibial tendon, peroneal tendon, Achilles tendon or the anterior ankle tendons.  Dactylitis, right 2nd toe.  Hammertoe contractures are noted. Gait pattern is non-antalgic.      DERMATOLOGY:  This ulceration to plantar aspect of the right 2nd toe sulcus.  There is severe malodor.  There is no purulence.  There is no probe to bone or osseous exposure.  Wound does probe deep however.  Granulation tissues are noted deep within the wound.  There is undermining.  The wound measures approximately 0.90 cm x 0.60 cm x 0.4 cm.  Periwound hyperkeratosis and hyperpigmentation is noted. No soft tissue crepitus or fluctuance is noted.    Assessment:     1. Chronic osteomyelitis of toe of right foot    2. Diabetic ulcer of toe of right foot associated with type 2 diabetes mellitus, with necrosis of bone    3. Type II diabetes mellitus with neurological manifestations    4. Type II diabetes mellitus with peripheral circulatory disorder     5. Severe obesity (BMI 35.0-39.9) with comorbidity        Plan:     Chronic osteomyelitis of toe of right foot  Diabetic ulcer of toe of right foot associated with type 2 diabetes mellitus, with necrosis of bone  I do recommend that the RLE 2nd toe be amputated as there is plain film and clinical evidence of OM. Patient did see Dr. Alvarenga on yesterday and did have GEETHA/PVR performed. As per patient, Dr. Alvarenga recs. amputation.  I do tend to agree with this given the clinical presentation and history.  I have called Dr. Alvarenga's office to have the testing forwarded over for my colleague, Dr. Viet Muir.  For now, please restart antibiotic, Ciprofloxacin and continue local wound care daily with Betadine paint. I did discuss IV antibiotics via PICC line as well, but I do not favor this as the patient has chronic osteomyelitis in the setting of hammertoe contractures and neuropathy.    Type II diabetes mellitus with neurological manifestations  Type II diabetes mellitus with peripheral circulatory disorder  I counseled the patient on his/her Diabetic Mellitus regarding today's clinical examination and objection findings. We did also discuss recent medication changes, pertinent labs and imaging evaluations and other medical consultation notes and progress notes. Greater than 50% of this visit was spent on counseling and coordination of care. Greater than 20 minutes of this appt. was spent on education about the diabetic foot, in relation to PVD and/or neuropathy, and the prevention of limb loss.     Shoe gear is inspected and wear and proper fit/type. Patient is reminded of the importance of good nutrition and blood sugar control to help prevent podiatric complications of diabetes. Patient instructed on proper foot hygeine. We discussed wearing proper shoe gear, daily foot inspections, never walking without protective shoe gear, never putting sharp instruments to feet.  Patient  will continue to monitor the areas  daily, inspect feet, wear protective shoe gear when ambulatory, moisturizer to maintain skin integrity.     Patient's DMI/DMII is managed by Primary Care Provider and/or Endocrinology Advanced Practice Provider. As per the most recent glycohemoglobin level, this patient is at goal.    Severe obesity (BMI 35.0-39.9) with comorbidity  Patient is counseled and reminded concerning the importance of good nutrition and healthy eating habits, which may include blood sugar control, to prevent and/or help podiatric foot and ankle complications.        Future Appointments   Date Time Provider Department Center   7/9/2019  9:20 AM [facility] [place]   8/16/2019  8:40 AM [facility]

## 2019-07-09 ENCOUNTER — OFFICE VISIT (OUTPATIENT)
Dept: PODIATRY | Facility: CLINIC | Age: 59
End: 2019-07-09
Payer: MEDICARE

## 2019-07-09 VITALS
WEIGHT: 229.94 LBS | SYSTOLIC BLOOD PRESSURE: 134 MMHG | DIASTOLIC BLOOD PRESSURE: 60 MMHG | HEIGHT: 66 IN | BODY MASS INDEX: 36.95 KG/M2 | HEART RATE: 67 BPM

## 2019-07-09 DIAGNOSIS — L97.514 DIABETIC ULCER OF TOE OF RIGHT FOOT ASSOCIATED WITH TYPE 2 DIABETES MELLITUS, WITH NECROSIS OF BONE: ICD-10-CM

## 2019-07-09 DIAGNOSIS — M86.671 CHRONIC OSTEOMYELITIS OF TOE OF RIGHT FOOT: Primary | ICD-10-CM

## 2019-07-09 DIAGNOSIS — E11.621 DIABETIC ULCER OF TOE OF RIGHT FOOT ASSOCIATED WITH TYPE 2 DIABETES MELLITUS, WITH NECROSIS OF BONE: ICD-10-CM

## 2019-07-09 PROCEDURE — 99213 OFFICE O/P EST LOW 20 MIN: CPT | Mod: S$GLB,,, | Performed by: PODIATRIST

## 2019-07-09 PROCEDURE — 99213 PR OFFICE/OUTPT VISIT, EST, LEVL III, 20-29 MIN: ICD-10-PCS | Mod: S$GLB,,, | Performed by: PODIATRIST

## 2019-07-09 PROCEDURE — 3008F PR BODY MASS INDEX (BMI) DOCUMENTED: ICD-10-PCS | Mod: CPTII,S$GLB,, | Performed by: PODIATRIST

## 2019-07-09 PROCEDURE — 99999 PR PBB SHADOW E&M-EST. PATIENT-LVL III: ICD-10-PCS | Mod: PBBFAC,,, | Performed by: PODIATRIST

## 2019-07-09 PROCEDURE — 99999 PR PBB SHADOW E&M-EST. PATIENT-LVL III: CPT | Mod: PBBFAC,,, | Performed by: PODIATRIST

## 2019-07-09 PROCEDURE — 3008F BODY MASS INDEX DOCD: CPT | Mod: CPTII,S$GLB,, | Performed by: PODIATRIST

## 2019-07-09 RX ORDER — SILVER SULFADIAZINE 10 G/1000G
CREAM TOPICAL 2 TIMES DAILY
Qty: 50 G | Refills: 0 | Status: SHIPPED | OUTPATIENT
Start: 2019-07-09 | End: 2020-01-10

## 2019-07-09 RX ORDER — CIPROFLOXACIN 500 MG/1
500 TABLET ORAL EVERY 12 HOURS
Qty: 14 TABLET | Refills: 0 | Status: SHIPPED | OUTPATIENT
Start: 2019-07-09 | End: 2019-07-16

## 2019-07-09 NOTE — PROGRESS NOTES
Subjective:     Patient ID: Julia Preston is a 58 y.o. female.    Chief Complaint: Wound Check (R second toe wound check. No noted drainage to ulcer. ulcer 1x1/2. necrotic borders around ulcer. No noted pain. Diabetic pt. Pt wearing tennis shoes w/ socks. No bandage. PCP DR Currie, upcoming appt on July 18,2019.)    Julia is a 58 y.o. female who presents to the clinic for evaluation and treatment of high risk feet. Julia has a past medical history of Congestive heart failure, Congestive heart failure (2018), Diabetes mellitus, type 2, GERD (gastroesophageal reflux disease), High cholesterol, Hyperlipidemia, Hypertension, Pneumonia (6/2017 or 7/2017 per the patient), and Pneumonia (03/2018). The patient's chief complaint is right toe ulcer. Patient states she has been taking the antibiotic as prescribed by Dr. Barrera since last week. Patient also states she has been applying the Betadine to the toe as instructed. Patient denies any drainage from the right foot. This patient has documented high risk feet requiring routine maintenance secondary to diabetes mellitis and those secondary complications of diabetes, as mentioned. Patient also states she was evaluated by Dr. Alvarenga and he said there is blood flow, but he did recommend toe amputation.     PCP: Idania Currie MD    Date Last Seen by PCP: 3/12/19    Current shoe gear:  Affected Foot: Extra depth shoes     Unaffected Foot: Extra depth shoes    History of Trauma: negative  Sign of Infection: none    Patient Active Problem List   Diagnosis    GERD (gastroesophageal reflux disease)    Type II diabetes mellitus with peripheral circulatory disorder    Hyperlipidemia    Hypertension    PVD (peripheral vascular disease)    Severe obesity (BMI 35.0-39.9) with comorbidity       Medication List with Changes/Refills   New Medications    SILVER SULFADIAZINE 1% (SILVADENE) 1 % CREAM    Apply topically 2 (two) times daily.   Current Medications    ACCU-CHEK  SIOMARA PLUS TEST STRP STRP        ACCU-CHEK SOFTCLIX LANCETS MISC        AMLODIPINE (NORVASC) 10 MG TABLET    Take 10 mg by mouth once daily.    ASPIRIN (ECOTRIN) 81 MG EC TABLET    Take 81 mg by mouth once daily.    ATORVASTATIN (LIPITOR) 80 MG TABLET    Take 80 mg by mouth once daily.    BD ALCOHOL SWABS PADM        CARVEDILOL (COREG) 12.5 MG TABLET        CLONIDINE (CATAPRES) 0.1 MG TABLET    Take 0.1 mg by mouth.    CLOPIDOGREL (PLAVIX) 75 MG TABLET    Take 75 mg by mouth once daily.    DOXYCYCLINE (VIBRAMYCIN) 100 MG CAP    Take 1 capsule (100 mg total) by mouth 2 (two) times daily.    ESOMEPRAZOLE MAGNESIUM (NEXIUM ORAL)    Take by mouth.    FUROSEMIDE (LASIX) 20 MG TABLET        GABAPENTIN (NEURONTIN) 300 MG CAPSULE    Take 1 capsule (300 mg total) by mouth 2 (two) times daily.    HYDRALAZINE (APRESOLINE) 100 MG TABLET        INSULIN ASPART PROTAMINE-INSULIN ASPART (NOVOLOG 70/30) 100 UNIT/ML (70-30) INPN PEN    Inject 40 Units into the skin every morning.    NIFEDIPINE (PROCARDIA XL) 60 MG (OSM) 24 HR TABLET    Take 60 mg by mouth once daily.    NITROGLYCERIN (NITROSTAT) 0.4 MG SL TABLET    Place 0.4 mg under the tongue every 5 (five) minutes as needed for Chest pain.    NOVOLIN 70/30 U-100 INSULIN 100 UNIT/ML (70-30) INJECTION        NOVOLOG MIX 70-30 U-100 INSULN 100 UNIT/ML (70-30) SOLN        PANTOPRAZOLE (PROTONIX) 40 MG TABLET    Take 40 mg by mouth once daily.    VITAMIN B-12 100 MCG TABLET       Changed and/or Refilled Medications    Modified Medication Previous Medication    CIPROFLOXACIN HCL (CIPRO) 500 MG TABLET ciprofloxacin HCl (CIPRO) 500 MG tablet       Take 1 tablet (500 mg total) by mouth every 12 (twelve) hours. for 7 days    Take 1 tablet (500 mg total) by mouth every 12 (twelve) hours. for 7 days       Review of patient's allergies indicates:   Allergen Reactions    Lortab [hydrocodone-acetaminophen] Itching       Past Surgical History:   Procedure Laterality Date    BREAST BIOPSY       "CARDIAC SURGERY  2013     SECTION      HYSTERECTOMY         Family History   Problem Relation Age of Onset    Hypertension Mother     Diabetes Mother     Hypertension Brother     Diabetes Brother     Seizures Son        Social History     Socioeconomic History    Marital status:      Spouse name: Not on file    Number of children: Not on file    Years of education: Not on file    Highest education level: Not on file   Occupational History    Not on file   Social Needs    Financial resource strain: Not on file    Food insecurity:     Worry: Not on file     Inability: Not on file    Transportation needs:     Medical: Not on file     Non-medical: Not on file   Tobacco Use    Smoking status: Never Smoker    Smokeless tobacco: Never Used   Substance and Sexual Activity    Alcohol use: No    Drug use: No    Sexual activity: Yes     Partners: Male   Lifestyle    Physical activity:     Days per week: Not on file     Minutes per session: Not on file    Stress: Not on file   Relationships    Social connections:     Talks on phone: Not on file     Gets together: Not on file     Attends [employer] service: Not on file     Active member of club or organization: Not on file     Attends meetings of clubs or organizations: Not on file     Relationship status: Not on file   Other Topics Concern    Not on file   Social History Narrative    Not on file       Vitals:    19 0932   BP: 134/60   Pulse: 67   Weight: 104.3 kg (229 lb 15 oz)   Height: 5' 6" (1.676 m)   PainSc: 0-No pain       Review of Systems   Constitutional: Negative for chills and fever.   Respiratory: Negative for shortness of breath.    Cardiovascular: Negative for chest pain, palpitations, orthopnea, claudication and leg swelling.   Gastrointestinal: Negative for diarrhea, nausea and vomiting.   Musculoskeletal: Negative for joint pain.   Skin: Negative for rash.   Neurological: Positive for sensory change. Negative for " dizziness, tingling, focal weakness and weakness.   Psychiatric/Behavioral: Negative.              Objective:      PHYSICAL EXAM: Apperance: Alert and orient in no distress,well developed, and with good attention to grooming and body habits  Patient presents ambulating in extra depth shoes.   Lower Extremity Exam  VASCULAR: Dorsalis pedis pulses 0/4(monphasic with doppler) bilateral and Posterior Tibial pulses 1/4 bilateral. Capillary fill time <4 seconds bilateral. No edema observed bilateral. Varicosities present bilateral. Skin temperature of the lower extremities is warm to cool, proximal to distal. Hair growth absent bilateral. (--) lymphangitis or (--) cellulitis noted right. Decreased darkened hyperpigmentation noted to right forefoot.   DERMATOLOGICAL: (+) edema, (--) erythema, (--) malodor, (--) drainage, (--) warmth to right foot.  Ulcer noted to plantar right 2nd toe  with granular base and with a 1 mm yellow/white border and hyperkeratosis surrounding ulcer. Ulcer measurement (pre) 0.9cm x 0.5cm. The ulcer does not extend into deeper tissue and (--) sinus tracts exist.  The dorsum surface of the feet are soft and supple.  The plantar aspects of feet are dry and scaly. Webspaces 1,2,3,4 clean, dry and without evidence of break in skin integrity bilateral. Nails 1,2,3,4,5 bilateral thickened, and discolored with subungual debris.  NEUROLOGICAL: Light touch, sharp-dull, proprioception all present and equal bilaterally.  Vibratory sensation diminished at bilateral hallux. Protective sensation absent at 4/10 sites as tested with a [place]Brandy 5.07 monofilament.   MUSCULOSKELETAL: Muscle strength is 5/5 for foot inverters, everters, plantarflexors, and dorsiflexors. Muscle tone is normal.     TEST RESULTS: Radiographs of right foot/ankle taken reveals erosive/destructive changes at the distal phalanx 2nd ray are again seen similar to the prior exam.  There are new similar findings now seen at the distal  phalanx 3rd digit.  Findings could be seen with osteomyelitis.  Achilles enthesophyte is noted.  Degenerative changes at the midfoot.  Atherosclerosis is noted.        Assessment:       Encounter Diagnoses   Name Primary?    Chronic osteomyelitis of toe of right foot Yes    Diabetic ulcer of toe of right foot associated with type 2 diabetes mellitus, with necrosis of bone          Plan:   Chronic osteomyelitis of toe of right foot  -     ciprofloxacin HCl (CIPRO) 500 MG tablet; Take 1 tablet (500 mg total) by mouth every 12 (twelve) hours. for 7 days  Dispense: 14 tablet; Refill: 0  -     silver sulfADIAZINE 1% (SILVADENE) 1 % cream; Apply topically 2 (two) times daily.  Dispense: 50 g; Refill: 0    Diabetic ulcer of toe of right foot associated with type 2 diabetes mellitus, with necrosis of bone  -     ciprofloxacin HCl (CIPRO) 500 MG tablet; Take 1 tablet (500 mg total) by mouth every 12 (twelve) hours. for 7 days  Dispense: 14 tablet; Refill: 0  -     silver sulfADIAZINE 1% (SILVADENE) 1 % cream; Apply topically 2 (two) times daily.  Dispense: 50 g; Refill: 0      I counseled the patient on her conditions, regarding findings of my examination, my impressions, and usual treatment plan.   Greater than 15 minutes of a 20 minute appointment spent on education about the diabetic foot, neuropathy, and prevention of limb loss.  Shoe inspection. Diabetic Foot Education. Patient reminded of the importance of good nutrition and blood sugar control to help prevent podiatric complications of diabetes. Patient instructed on proper foot hygeine. We discussed wearing proper shoe gear, daily foot inspections, never walking without protective shoe gear, never putting sharp instruments to feet.    With patient's permission, nails 1-5 bilateral were debrided/trimmed in length and thickness aggressively to their soft tissue attachment mechanically and with electric , removing all offending nail and debris. Patient relates  relief following the procedure.  Discussed with patient in detail the treatment options for infected ulcer with bone infection, including (1) local wound care with IV antibiotic for 6-8 weeks, or (2) surgical excision(amputation) of infected bone. Risk for treatments including further limb loss, delayed healing, non-healing was also discussed. Patient states she understands both treatment options and would like to proceed with further testing.   With patient's permission, the right 2nd toe wound was sharply excisionally debrided of viable and nonviable tissue from the wound periphery and base. I redefined the wound borders in the process. Debridement was carried into and did include the subcutaneous layer down to good bleeding granular tissue base utilizing sterile #15 blade and tissue nipper and forceps. Wound was irrigated with sterile saline and bleeding was controlled with direct pressure. Minimal blood loss.  Post debridement measurements are contained in the above progress note. The patient tolerated this well. Wound was then dressed with Silvadene, gauze, and papertape. Patient was given instructions on daily dressing changes. Patient was also instructed on the importance of keeping the wound and dressings clean dry and intact and keeping pressure off the wound area until complete healing of the wound.The patient is alerted to watch for any signs of infection (redness, pus, pain, increased swelling or fever) and call if such occurs. Home wound care instructions are provided.  Discussed with patient in detail the treatment options for infected ulcer with bone infection, including (1) local wound care with IV antibiotic for 6-8 weeks, or (2) surgical excision(amputation) of infected bone. Risk for treatments including further limb loss, delayed healing, non-healing was also discussed. Patient states she understands both treatment options and would like to try local wound care with oral antibiotics. Patient  states she also understands that she may still need amputation if IV antibiotics and wound care does not work.   Prescription written for Silvadene 1% to be applied to right toe ulcer once daily.   Prescription for Cipro refilled.   Completed referral for [place].     Patient to return in 2 weeks or sooner if needed.             Viet Muir DPM  [facility]

## 2019-07-29 ENCOUNTER — TELEPHONE (OUTPATIENT)
Dept: PODIATRY | Facility: CLINIC | Age: 59
End: 2019-07-29

## 2019-07-29 NOTE — TELEPHONE ENCOUNTER
called in regards to up coming podiatry appointment on Wednesday July 31, 2019 at 3:00pm. No answer, voice mail box full.    ----- Message from Jaclyn Urban sent at 7/29/2019 11:54 AM CDT -----  Contact: Anahy Preston (daughter)  Type:  Sooner Apoointment Request    Caller is requesting a sooner appointment.  Caller declined first available appointment listed below.  Caller will not accept being placed on the waitlist and is requesting a message be sent to doctor.  Name of Caller: Sushma (daughter)  When is the first available appointment? 08/28/2019  Symptoms: hosp f/u  Would the patient rather a call back or a response via [facility]? callback  Best Call Back Number: [phone]  Additional Information: If Anahy is not home, ask for Joseph, Joseph is pt spouse

## 2019-08-29 ENCOUNTER — TELEPHONE (OUTPATIENT)
Dept: HOME HEALTH SERVICES | Facility: HOSPITAL | Age: 59
End: 2019-08-29

## 2019-10-16 ENCOUNTER — OFFICE VISIT (OUTPATIENT)
Dept: PODIATRY | Facility: CLINIC | Age: 59
End: 2019-10-16
Payer: MEDICARE

## 2019-10-16 VITALS
DIASTOLIC BLOOD PRESSURE: 58 MMHG | BODY MASS INDEX: 35.75 KG/M2 | HEIGHT: 66 IN | SYSTOLIC BLOOD PRESSURE: 147 MMHG | HEART RATE: 63 BPM | WEIGHT: 222.44 LBS

## 2019-10-16 DIAGNOSIS — I73.9 PVD (PERIPHERAL VASCULAR DISEASE): ICD-10-CM

## 2019-10-16 DIAGNOSIS — E11.49 TYPE II DIABETES MELLITUS WITH NEUROLOGICAL MANIFESTATIONS: Primary | ICD-10-CM

## 2019-10-16 DIAGNOSIS — B35.1 DERMATOPHYTOSIS OF NAIL: ICD-10-CM

## 2019-10-16 PROCEDURE — 11721 PR DEBRIDEMENT OF NAILS, 6 OR MORE: ICD-10-PCS | Mod: Q8,S$GLB,, | Performed by: PODIATRIST

## 2019-10-16 PROCEDURE — 99499 UNLISTED E&M SERVICE: CPT | Mod: S$GLB,,, | Performed by: PODIATRIST

## 2019-10-16 PROCEDURE — 99499 NO LOS: ICD-10-PCS | Mod: S$GLB,,, | Performed by: PODIATRIST

## 2019-10-16 PROCEDURE — 11721 DEBRIDE NAIL 6 OR MORE: CPT | Mod: Q8,S$GLB,, | Performed by: PODIATRIST

## 2019-10-16 PROCEDURE — 99999 PR PBB SHADOW E&M-EST. PATIENT-LVL III: CPT | Mod: PBBFAC,,, | Performed by: PODIATRIST

## 2019-10-16 PROCEDURE — 3008F PR BODY MASS INDEX (BMI) DOCUMENTED: ICD-10-PCS | Mod: CPTII,S$GLB,, | Performed by: PODIATRIST

## 2019-10-16 PROCEDURE — 3008F BODY MASS INDEX DOCD: CPT | Mod: CPTII,S$GLB,, | Performed by: PODIATRIST

## 2019-10-16 PROCEDURE — 99999 PR PBB SHADOW E&M-EST. PATIENT-LVL III: ICD-10-PCS | Mod: PBBFAC,,, | Performed by: PODIATRIST

## 2019-10-28 NOTE — PROGRESS NOTES
Subjective:     Patient ID: Julia Preston is a 59 y.o. female.    Chief Complaint: Diabetic Foot Exam (no c/o pain. wears tennis shoes with socks. diabetic Pt. PCP Dr. Currie.)    Julia is a 59 y.o. female who presents to the clinic for evaluation and treatment of high risk feet. Julia has a past medical history of Congestive heart failure, Congestive heart failure (2018), Diabetes mellitus, type 2, GERD (gastroesophageal reflux disease), High cholesterol, Hyperlipidemia, Hypertension, Pneumonia (6/2017 or 7/2017 per the patient), and Pneumonia (03/2018). The patient's chief complaint is long, thick toenails. This patient has documented high risk feet requiring routine maintenance secondary to diabetes mellitis and those secondary complications of diabetes, as mentioned..    PCP: Idania Currie MD    Date Last Seen by PCP: 08/18/19    Current shoe gear:  Affected Foot: Tennis shoes     Unaffected Foot: Tennis shoes    No results found for: HGBA1C        Patient Active Problem List   Diagnosis    GERD (gastroesophageal reflux disease)    Type II diabetes mellitus with peripheral circulatory disorder    Hyperlipidemia    Hypertension    PVD (peripheral vascular disease)    Severe obesity (BMI 35.0-39.9) with comorbidity       Medication List with Changes/Refills   Current Medications    ACCU-CHEK SIOMARA PLUS TEST STRP STRP        ACCU-CHEK SOFTCLIX LANCETS MISC        AMLODIPINE (NORVASC) 10 MG TABLET    Take 10 mg by mouth once daily.    ASPIRIN (ECOTRIN) 81 MG EC TABLET    Take 81 mg by mouth once daily.    ATORVASTATIN (LIPITOR) 80 MG TABLET    Take 80 mg by mouth once daily.    BD ALCOHOL SWABS PADM        CARVEDILOL (COREG) 12.5 MG TABLET        CLONIDINE (CATAPRES) 0.1 MG TABLET    Take 0.1 mg by mouth.    CLOPIDOGREL (PLAVIX) 75 MG TABLET    Take 75 mg by mouth once daily.    DOXYCYCLINE (VIBRAMYCIN) 100 MG CAP    Take 1 capsule (100 mg total) by mouth 2 (two) times daily.    ESOMEPRAZOLE  MAGNESIUM (NEXIUM ORAL)    Take by mouth.    FUROSEMIDE (LASIX) 20 MG TABLET        GABAPENTIN (NEURONTIN) 300 MG CAPSULE    Take 1 capsule (300 mg total) by mouth 2 (two) times daily.    HYDRALAZINE (APRESOLINE) 100 MG TABLET        INSULIN ASPART PROTAMINE-INSULIN ASPART (NOVOLOG 70/30) 100 UNIT/ML (70-30) INPN PEN    Inject 40 Units into the skin every morning.    NIFEDIPINE (PROCARDIA XL) 60 MG (OSM) 24 HR TABLET    Take 60 mg by mouth once daily.    NITROGLYCERIN (NITROSTAT) 0.4 MG SL TABLET    Place 0.4 mg under the tongue every 5 (five) minutes as needed for Chest pain.    NOVOLIN 70/30 U-100 INSULIN 100 UNIT/ML (70-30) INJECTION        NOVOLOG MIX 70-30 U-100 INSULN 100 UNIT/ML (70-30) SOLN        PANTOPRAZOLE (PROTONIX) 40 MG TABLET    Take 40 mg by mouth once daily.    SILVER SULFADIAZINE 1% (SILVADENE) 1 % CREAM    Apply topically 2 (two) times daily.    VITAMIN B-12 100 MCG TABLET           Review of patient's allergies indicates:   Allergen Reactions    Lortab [hydrocodone-acetaminophen] Itching       Past Surgical History:   Procedure Laterality Date    BREAST BIOPSY      CARDIAC SURGERY  2013     SECTION      HYSTERECTOMY         Family History   Problem Relation Age of Onset    Hypertension Mother     Diabetes Mother     Hypertension Brother     Diabetes Brother     Seizures Son        Social History     Socioeconomic History    Marital status:      Spouse name: Not on file    Number of children: Not on file    Years of education: Not on file    Highest education level: Not on file   Occupational History    Not on file   Social Needs    Financial resource strain: Not on file    Food insecurity:     Worry: Not on file     Inability: Not on file    Transportation needs:     Medical: Not on file     Non-medical: Not on file   Tobacco Use    Smoking status: Never Smoker    Smokeless tobacco: Never Used   Substance and Sexual Activity    Alcohol use: No    Drug use: No  "   Sexual activity: Yes     Partners: Male   Lifestyle    Physical activity:     Days per week: Not on file     Minutes per session: Not on file    Stress: Not on file   Relationships    Social connections:     Talks on phone: Not on file     Gets together: Not on file     Attends Tenriism service: Not on file     Active member of club or organization: Not on file     Attends meetings of clubs or organizations: Not on file     Relationship status: Not on file   Other Topics Concern    Not on file   Social History Narrative    Not on file       Vitals:    10/16/19 0858   BP: (!) 147/58   Pulse: 63   Weight: 100.9 kg (222 lb 7.1 oz)   Height: 5' 6" (1.676 m)   PainSc: 0-No pain   PainLoc: Foot       Review of Systems   Constitutional: Negative for chills and fever.   Respiratory: Negative for shortness of breath.    Cardiovascular: Negative for chest pain, palpitations, orthopnea, claudication and leg swelling.   Gastrointestinal: Negative for diarrhea, nausea and vomiting.   Musculoskeletal: Negative for joint pain.   Skin: Negative for rash.   Neurological: Positive for tingling and sensory change.   Psychiatric/Behavioral: Negative.              Objective:   PHYSICAL EXAM: Apperance: Alert and orient in no distress,well developed, and with good attention to grooming and body habits  Patient presents ambulating in tennis shoes.   LOWER EXTREMITY EXAM:  VASCULAR: Dorsalis pedis pulses 0/4(monphasic with doppler) bilateral and Posterior Tibial pulses 1/4 bilateral. Capillary fill time <4 seconds bilateral. No edema observed bilateral. Varicosities present bilateral. Skin temperature of the lower extremities is warm to cool, proximal to distal. Hair growth absent bilateral. (--) lymphangitis or (--) cellulitis noted right.  DERMATOLOGICAL: (-) edema, (-) erythema, (-) malodor, (-) drainage, (-) warmth to right foot.  Previous ulcer noted to right plantar hallux and 2nd toe closed with pink epithealized tissue. " The dorsum surface of the feet are soft and supple.  The plantar aspects of feet are dry and scaly. Webspaces 1,2,3,4 clean, dry and without evidence of break in skin integrity bilateral. Nails 1,2,3,4,5 bilateral elongated, thickened, and discolored with subungual debris.  NEUROLOGICAL: Light touch, sharp-dull, proprioception all present and equal bilaterally.  Vibratory sensation diminished at bilateral hallux. Protective sensation absent at 4/10 sites as tested with a [place]-Brandy 5.07 monofilament.   MUSCULOSKELETAL: Muscle strength is 5/5 for foot inverters, everters, plantarflexors, and dorsiflexors. Muscle tone is normal.       Assessment:   Type II diabetes mellitus with neurological manifestations    PVD (peripheral vascular disease)    Dermatophytosis of nail          Plan:   Type II diabetes mellitus with neurological manifestations    PVD (peripheral vascular disease)    Dermatophytosis of nail      I counseled the patient on her conditions, regarding findings of my examination, my impressions, and usual treatment plan.   Greater than 50% of this visit spent on counseling and coordination of care.  Greater than 15 minutes of a 20 minute appointment spent on education about the diabetic foot, neuropathy, and prevention of limb loss.  Shoe inspection. Diabetic Foot Education. Patient reminded of the importance of good nutrition and blood sugar control to help prevent podiatric complications of diabetes. Patient instructed on proper foot hygeine. We discussed wearing proper shoe gear, daily foot inspections, never walking without protective shoe gear, never putting sharp instruments to feet.    With patient's permission, nails 1-5 bilateral were debrided/trimmed in length and thickness aggressively to their soft tissue attachment mechanically and with electric , removing all offending nail and debris. Patient relates relief following the procedure.  Patient  will continue to monitor the areas  daily, inspect feet, wear protective shoe gear when ambulatory, moisturizer to maintain skin integrity. Patient reminded of the importance of good nutrition and blood sugar control to help prevent podiatric complications of diabetes.  Patient to return 2 months or sooner if needed.                 Viet Muir DPM  [facility]

## 2020-01-10 ENCOUNTER — OFFICE VISIT (OUTPATIENT)
Dept: OBSTETRICS AND GYNECOLOGY | Facility: CLINIC | Age: 60
End: 2020-01-10
Payer: MEDICARE

## 2020-01-10 VITALS
WEIGHT: 209.31 LBS | SYSTOLIC BLOOD PRESSURE: 122 MMHG | DIASTOLIC BLOOD PRESSURE: 68 MMHG | BODY MASS INDEX: 33.64 KG/M2 | HEIGHT: 66 IN

## 2020-01-10 DIAGNOSIS — Z01.419 ENCOUNTER FOR GYNECOLOGICAL EXAMINATION (GENERAL) (ROUTINE) WITHOUT ABNORMAL FINDINGS: Primary | ICD-10-CM

## 2020-01-10 DIAGNOSIS — Z12.4 SCREENING FOR CERVICAL CANCER: ICD-10-CM

## 2020-01-10 DIAGNOSIS — Z12.31 SCREENING MAMMOGRAM, ENCOUNTER FOR: ICD-10-CM

## 2020-01-10 PROCEDURE — G0101 PR CA SCREEN;PELVIC/BREAST EXAM: ICD-10-PCS | Mod: S$GLB,,, | Performed by: OBSTETRICS & GYNECOLOGY

## 2020-01-10 PROCEDURE — 99999 PR PBB SHADOW E&M-EST. PATIENT-LVL III: CPT | Mod: PBBFAC,,, | Performed by: OBSTETRICS & GYNECOLOGY

## 2020-01-10 PROCEDURE — 99999 PR PBB SHADOW E&M-EST. PATIENT-LVL III: ICD-10-PCS | Mod: PBBFAC,,, | Performed by: OBSTETRICS & GYNECOLOGY

## 2020-01-10 PROCEDURE — G0101 CA SCREEN;PELVIC/BREAST EXAM: HCPCS | Mod: S$GLB,,, | Performed by: OBSTETRICS & GYNECOLOGY

## 2020-01-10 RX ORDER — CLONIDINE HYDROCHLORIDE 0.1 MG/1
1 TABLET ORAL
COMMUNITY
End: 2022-06-20

## 2020-01-10 RX ORDER — ACETAMINOPHEN 500 MG
1-2 TABLET ORAL
COMMUNITY

## 2020-01-10 RX ORDER — ERGOCALCIFEROL 1.25 MG/1
1 CAPSULE ORAL
COMMUNITY
Start: 2019-05-31

## 2020-01-10 RX ORDER — INSULIN ASPART 100 [IU]/ML
40 INJECTION, SOLUTION INTRAVENOUS; SUBCUTANEOUS
COMMUNITY

## 2020-01-10 RX ORDER — RIVAROXABAN 2.5 MG/1
TABLET, FILM COATED ORAL
COMMUNITY
Start: 2020-01-09

## 2020-01-10 NOTE — PROGRESS NOTES
Subjective:       Patient ID: Julia Preston is a 59 y.o. female.    Chief Complaint:  Well Woman      History of Present Illness  HPI  Annual Exam-Postmenopausal  Patient presents for annual exam. The patient has no complaints today. The patient is sexually active--denies vaginal dryness; . GYN screening history: last pap: was normal and patient does not recall when last pap was and last mammogram: approximate date 2018 and was normal. The patient is not currently taking hormone replacement therapy. Patient denies post-menopausal vaginal bleeding. The patient wears seatbelts: yes. The patient participates in regular exercise: yes.--exercise bike--30-45 min/d;  Has the patient ever been transfused or tattooed?: yes. --iron transfusion--2019; The patient reports that there is not domestic violence in her life.    Denies leak of urine with cough/sneeze    occas problems sleeping  Denies hot flushes, night sweats        GYN & OB History  Patient's last menstrual period was 10/11/1988 (approximate).   Date of Last Pap: No result found    OB History    Para Term  AB Living   4 4 4     5   SAB TAB Ectopic Multiple Live Births         1 5      # Outcome Date GA Lbr Erik/2nd Weight Sex Delivery Anes PTL Lv   4A Term      CS-Unspec   LINA   4B Term      CS-Unspec   LINA   3 Term      Vag-Spont   LINA   2 Term      Vag-Spont   LINA   1 Term      Vag-Spont   LINA       Review of Systems  Review of Systems   All other systems reviewed and are negative.          Objective:      Physical Exam:   Constitutional: She appears well-developed.     Eyes: Pupils are equal, round, and reactive to light. Conjunctivae and EOM are normal.    Neck: Normal range of motion. Neck supple.     Pulmonary/Chest: Effort normal. Right breast exhibits no mass, no nipple discharge, no skin change and no tenderness. Left breast exhibits no mass, no nipple discharge, no skin change and no tenderness. Breasts are symmetrical.        Abdominal:  Soft.     Genitourinary: Rectum normal and vagina normal. Pelvic exam was performed with patient supine. Uterus is absent. Right adnexum displays no mass and no tenderness. Left adnexum displays no mass and no tenderness. No erythema, bleeding, rectocele, cystocele or unspecified prolapse of vaginal walls in the vagina. No vaginal discharge found. Vaginal cuff normal.Labial bartholins normal.Cervix exhibits absence.   Genitourinary Comments: atrophic           Musculoskeletal: Normal range of motion.       Neurological: She is alert.    Skin: Skin is warm.    Psychiatric: She has a normal mood and affect.           Assessment:         Encounter Diagnoses   Name Primary?    Screening mammogram, encounter for     Encounter for gynecological examination (general) (routine) without abnormal findings Yes    Screening for cervical cancer                 Plan:      Continue annual well woman exam.  Pap not indicated due to hx of nml pap and hx of kusum  mammo ordered, continue yearly until age 73  Encouraged  diet, exercise, weight loss  Osteoporosis prevention; 1200mg calcium/d with source of vitamin d   pt to scheduled colonoscopy--due this year

## 2020-01-22 ENCOUNTER — OFFICE VISIT (OUTPATIENT)
Dept: PODIATRY | Facility: CLINIC | Age: 60
End: 2020-01-22
Payer: MEDICARE

## 2020-01-22 VITALS
SYSTOLIC BLOOD PRESSURE: 146 MMHG | HEART RATE: 68 BPM | DIASTOLIC BLOOD PRESSURE: 63 MMHG | BODY MASS INDEX: 33.66 KG/M2 | WEIGHT: 209.44 LBS | HEIGHT: 66 IN

## 2020-01-22 DIAGNOSIS — B35.1 DERMATOPHYTOSIS OF NAIL: ICD-10-CM

## 2020-01-22 DIAGNOSIS — I73.9 PVD (PERIPHERAL VASCULAR DISEASE): ICD-10-CM

## 2020-01-22 DIAGNOSIS — E11.49 TYPE II DIABETES MELLITUS WITH NEUROLOGICAL MANIFESTATIONS: Primary | ICD-10-CM

## 2020-01-22 PROCEDURE — 99499 UNLISTED E&M SERVICE: CPT | Mod: S$GLB,,, | Performed by: PODIATRIST

## 2020-01-22 PROCEDURE — 99999 PR PBB SHADOW E&M-EST. PATIENT-LVL III: ICD-10-PCS | Mod: PBBFAC,,, | Performed by: PODIATRIST

## 2020-01-22 PROCEDURE — 11721 DEBRIDE NAIL 6 OR MORE: CPT | Mod: Q8,S$GLB,, | Performed by: PODIATRIST

## 2020-01-22 PROCEDURE — 99499 NO LOS: ICD-10-PCS | Mod: S$GLB,,, | Performed by: PODIATRIST

## 2020-01-22 PROCEDURE — 11721 PR DEBRIDEMENT OF NAILS, 6 OR MORE: ICD-10-PCS | Mod: Q8,S$GLB,, | Performed by: PODIATRIST

## 2020-01-22 PROCEDURE — 99999 PR PBB SHADOW E&M-EST. PATIENT-LVL III: CPT | Mod: PBBFAC,,, | Performed by: PODIATRIST

## 2020-01-22 NOTE — PROGRESS NOTES
Subjective:     Patient ID: Julia Preston is a 59 y.o. female.    Chief Complaint: Diabetic Foot Exam (Diabetic Foot Check. c/o no pain. Pt states at night she feels some tenderness in both feet. Diabetic pt. Wears tennis shoes. PCP DR Currie, Next visit [facility] 5,2020. )    Julia is a 59 y.o. female who presents to the clinic for evaluation and treatment of high risk feet. Julia has a past medical history of CHF (congestive heart failure), Congestive heart failure, Congestive heart failure (2018), Diabetes mellitus, type 2, GERD (gastroesophageal reflux disease), High cholesterol, Hyperlipidemia, Hypertension, Pneumonia (6/2017 or 7/2017 per the patient), and Pneumonia (03/2018). The patient's chief complaint is long, thick toenails. This patient has documented high risk feet requiring routine maintenance secondary to diabetes mellitis and those secondary complications of diabetes, as mentioned..    PCP: Idania Currie MD    Date Last Seen by PCP: 08/18/19 (3/5/2020)    Current shoe gear:  Affected Foot: Diabetic shoes     Unaffected Foot: Diabetic shoes      Patient Active Problem List   Diagnosis    GERD (gastroesophageal reflux disease)    Type II diabetes mellitus with peripheral circulatory disorder    Hyperlipidemia    Hypertension    PVD (peripheral vascular disease)    Severe obesity (BMI 35.0-39.9) with comorbidity       Medication List with Changes/Refills   Current Medications    ACCU-CHEK SIOMARA PLUS TEST STRP STRP        ACCU-CHEK SOFTCLIX LANCETS MISC        ACETAMINOPHEN (TYLENOL) 500 MG TABLET    Take 1-2 tablets by mouth.    AMLODIPINE (NORVASC) 10 MG TABLET    Take 10 mg by mouth once daily.    ASPIRIN (ECOTRIN) 81 MG EC TABLET    Take 81 mg by mouth once daily.    ATORVASTATIN (LIPITOR) 80 MG TABLET    Take 80 mg by mouth once daily.    BD ALCOHOL SWABS PADM        CARVEDILOL (COREG) 12.5 MG TABLET        CLONIDINE (CATAPRES) 0.1 MG TABLET    Take 1 tablet by mouth.     ERGOCALCIFEROL (ERGOCALCIFEROL) 50,000 UNIT CAP    Take 1 capsule by mouth.    FUROSEMIDE (LASIX) 20 MG TABLET        HYDRALAZINE (APRESOLINE) 100 MG TABLET        INSULIN ASPART PROTAMINE-INSULIN ASPART (NOVOLOG 70/30) 100 UNIT/ML (70-30) INPN PEN    Inject 40 Units into the skin every morning.    INSULIN ASPART U-100 (NOVOLOG) 100 UNIT/ML INJECTION    Inject 40 Units into the skin.    NIFEDIPINE (PROCARDIA XL) 60 MG (OSM) 24 HR TABLET    Take 60 mg by mouth once daily.    NITROGLYCERIN (NITROSTAT) 0.4 MG SL TABLET    Place 0.4 mg under the tongue every 5 (five) minutes as needed for Chest pain.    NOVOLIN 70/30 U-100 INSULIN 100 UNIT/ML (70-30) INJECTION        NOVOLOG MIX 70-30 U-100 INSULN 100 UNIT/ML (70-30) SOLN        PAZEO 0.7 % DROP        RANITIDINE (ZANTAC) 75 MG TABLET    Take 1 tablet by mouth.    XARELTO 2.5 MG TAB           Review of patient's allergies indicates:   Allergen Reactions    Lortab [hydrocodone-acetaminophen] Itching    Morphine Other (See Comments)     Hallucinations  Hallucinations         Past Surgical History:   Procedure Laterality Date    BREAST BIOPSY      CARDIAC SURGERY  2013     SECTION      HYSTERECTOMY         Family History   Problem Relation Age of Onset    Hypertension Mother     Diabetes Mother     Hypertension Brother     Diabetes Brother     Seizures Son        Social History     Socioeconomic History    Marital status:      Spouse name: Not on file    Number of children: Not on file    Years of education: Not on file    Highest education level: Not on file   Occupational History    Not on file   Social Needs    Financial resource strain: Not on file    Food insecurity:     Worry: Not on file     Inability: Not on file    Transportation needs:     Medical: Not on file     Non-medical: Not on file   Tobacco Use    Smoking status: Never Smoker    Smokeless tobacco: Never Used   Substance and Sexual Activity    Alcohol use: No    Drug  "use: No    Sexual activity: Yes     Partners: Male   Lifestyle    Physical activity:     Days per week: Not on file     Minutes per session: Not on file    Stress: Not on file   Relationships    Social connections:     Talks on phone: Not on file     Gets together: Not on file     Attends Latter day service: Not on file     Active member of club or organization: Not on file     Attends meetings of clubs or organizations: Not on file     Relationship status: Not on file   Other Topics Concern    Not on file   Social History Narrative    Not on file       Vitals:    01/22/20 1340   BP: (!) 146/63   Pulse: 68   Weight: 95 kg (209 lb 7 oz)   Height: 5' 6" (1.676 m)   PainSc: 0-No pain       Review of Systems   Constitutional: Negative for chills and fever.   Respiratory: Negative for shortness of breath.    Cardiovascular: Negative for chest pain, palpitations, orthopnea, claudication and leg swelling.   Gastrointestinal: Negative for diarrhea, nausea and vomiting.   Musculoskeletal: Negative for joint pain.   Skin: Negative for rash.   Neurological: Positive for tingling and sensory change.   Psychiatric/Behavioral: Negative.              Objective:   PHYSICAL EXAM: Apperance: Alert and orient in no distress,well developed, and with good attention to grooming and body habits  Patient presents ambulating in tennis shoes.   LOWER EXTREMITY EXAM:  VASCULAR: Dorsalis pedis pulses 0/4(monphasic with doppler) bilateral and Posterior Tibial pulses 1/4 bilateral. Capillary fill time <4 seconds bilateral. No edema observed bilateral. Varicosities present bilateral. Skin temperature of the lower extremities is warm to cool, proximal to distal. Hair growth absent bilateral. (--) lymphangitis or (--) cellulitis noted right.  DERMATOLOGICAL: (-) edema, (-) erythema, (-) malodor, (-) drainage, (-) warmth to right foot.  Previous ulcer noted to right plantar hallux and 2nd toe closed with pink epithealized tissue. The dorsum " surface of the feet are soft and supple.  The plantar aspects of feet are dry and scaly. Webspaces 1,2,3,4 clean, dry and without evidence of break in skin integrity bilateral. Nails 1,2,3,4,5 bilateral elongated, thickened, and discolored with subungual debris.  NEUROLOGICAL: Light touch, sharp-dull, proprioception all present and equal bilaterally.  Vibratory sensation diminished at bilateral hallux. Protective sensation absent at 4/10 sites as tested with a [place]-Brandy 5.07 monofilament.   MUSCULOSKELETAL: Muscle strength is 5/5 for foot inverters, everters, plantarflexors, and dorsiflexors. Muscle tone is normal.       Assessment:   Type II diabetes mellitus with neurological manifestations    PVD (peripheral vascular disease)    Dermatophytosis of nail          Plan:   Type II diabetes mellitus with neurological manifestations    PVD (peripheral vascular disease)    Dermatophytosis of nail      I counseled the patient on her conditions, regarding findings of my examination, my impressions, and usual treatment plan.   Greater than 50% of this visit spent on counseling and coordination of care.  Greater than 15 minutes of a 20 minute appointment spent on education about the diabetic foot, neuropathy, and prevention of limb loss.  Shoe inspection. Diabetic Foot Education. Patient reminded of the importance of good nutrition and blood sugar control to help prevent podiatric complications of diabetes. Patient instructed on proper foot hygeine. We discussed wearing proper shoe gear, daily foot inspections, never walking without protective shoe gear, never putting sharp instruments to feet.    With patient's permission, nails 1-5 bilateral were debrided/trimmed in length and thickness aggressively to their soft tissue attachment mechanically and with electric , removing all offending nail and debris. Patient relates relief following the procedure.  Patient instructed to continue Eloquis as prescribed.    Patient  will continue to monitor the areas daily, inspect feet, wear protective shoe gear when ambulatory, moisturizer to maintain skin integrity. Patient reminded of the importance of good nutrition and blood sugar control to help prevent podiatric complications of diabetes.  Patient to return 2 months or sooner if needed.                 Viet Muir DPM  [facility]

## 2020-01-31 ENCOUNTER — HOSPITAL ENCOUNTER (OUTPATIENT)
Dept: RADIOLOGY | Facility: HOSPITAL | Age: 60
Discharge: HOME | End: 2020-01-31
Attending: OBSTETRICS & GYNECOLOGY
Payer: MEDICARE

## 2020-01-31 VITALS — HEIGHT: 66 IN | WEIGHT: 209.44 LBS | BODY MASS INDEX: 33.66 KG/M2

## 2020-01-31 DIAGNOSIS — Z12.31 SCREENING MAMMOGRAM, ENCOUNTER FOR: ICD-10-CM

## 2020-01-31 PROCEDURE — 77067 SCR MAMMO BI INCL CAD: CPT | Mod: 26,,, | Performed by: RADIOLOGY

## 2020-01-31 PROCEDURE — 77067 MAMMO DIGITAL SCREENING BILAT WITH CAD: ICD-10-PCS | Mod: 26,,, | Performed by: RADIOLOGY

## 2020-01-31 PROCEDURE — 77067 SCR MAMMO BI INCL CAD: CPT | Mod: TC

## 2020-04-14 ENCOUNTER — TELEPHONE (OUTPATIENT)
Dept: PODIATRY | Facility: CLINIC | Age: 60
End: 2020-04-14

## 2020-04-14 NOTE — TELEPHONE ENCOUNTER
Called to let patient know that we are canceling/rescheduling podiatry appointment on 04/22/2020 due to Covid-19.    There was no answer. I left a detailed voice message stating Patient may call back to reschedule appointment within 60 days.

## 2020-06-15 ENCOUNTER — OFFICE VISIT (OUTPATIENT)
Dept: PODIATRY | Facility: CLINIC | Age: 60
End: 2020-06-15
Payer: MEDICARE

## 2020-06-15 VITALS
WEIGHT: 209 LBS | DIASTOLIC BLOOD PRESSURE: 61 MMHG | HEART RATE: 70 BPM | BODY MASS INDEX: 33.73 KG/M2 | SYSTOLIC BLOOD PRESSURE: 143 MMHG

## 2020-06-15 DIAGNOSIS — I73.9 PVD (PERIPHERAL VASCULAR DISEASE): ICD-10-CM

## 2020-06-15 DIAGNOSIS — E11.49 TYPE II DIABETES MELLITUS WITH NEUROLOGICAL MANIFESTATIONS: Primary | ICD-10-CM

## 2020-06-15 DIAGNOSIS — B35.1 DERMATOPHYTOSIS OF NAIL: ICD-10-CM

## 2020-06-15 PROCEDURE — 99499 UNLISTED E&M SERVICE: CPT | Mod: S$GLB,,, | Performed by: PODIATRIST

## 2020-06-15 PROCEDURE — 99999 PR PBB SHADOW E&M-EST. PATIENT-LVL IV: CPT | Mod: PBBFAC,,, | Performed by: PODIATRIST

## 2020-06-15 PROCEDURE — 99499 NO LOS: ICD-10-PCS | Mod: S$GLB,,, | Performed by: PODIATRIST

## 2020-06-15 PROCEDURE — 11721 PR DEBRIDEMENT OF NAILS, 6 OR MORE: ICD-10-PCS | Mod: Q8,S$GLB,, | Performed by: PODIATRIST

## 2020-06-15 PROCEDURE — 99999 PR PBB SHADOW E&M-EST. PATIENT-LVL IV: ICD-10-PCS | Mod: PBBFAC,,, | Performed by: PODIATRIST

## 2020-06-15 PROCEDURE — 11721 DEBRIDE NAIL 6 OR MORE: CPT | Mod: Q8,S$GLB,, | Performed by: PODIATRIST

## 2020-06-15 NOTE — PROGRESS NOTES
Subjective:     Patient ID: Julia Preston is a 59 y.o. female.    Chief Complaint: Nail Care (no c/o pain. wears tennis shoes with socks. diabetic Pt. PCP Dr. Currie.)    Julia is a 59 y.o. female who presents to the clinic for evaluation and treatment of high risk feet. Julia has a past medical history of CHF (congestive heart failure), Congestive heart failure, Congestive heart failure (2018), Diabetes mellitus, type 2, GERD (gastroesophageal reflux disease), High cholesterol, Hyperlipidemia, Hypertension, Pneumonia (6/2017 or 7/2017 per the patient), and Pneumonia (03/2018). The patient's chief complaint is long, thick toenails. This patient has documented high risk feet requiring routine maintenance secondary to diabetes mellitis and those secondary complications of diabetes, as mentioned. Patient states she has scheduled to have her circulation test done again to have it checked.     PCP: Idania Currie MD    Date Last Seen by PCP: 3/5/2020    Current shoe gear:  Affected Foot: Diabetic shoes     Unaffected Foot: Diabetic shoes      Patient Active Problem List   Diagnosis    GERD (gastroesophageal reflux disease)    Type II diabetes mellitus with peripheral circulatory disorder    Hyperlipidemia    Hypertension    PVD (peripheral vascular disease)    Severe obesity (BMI 35.0-39.9) with comorbidity       Medication List with Changes/Refills   Current Medications    ACCU-CHEK SIOMARA PLUS TEST STRP STRP        ACCU-CHEK SOFTCLIX LANCETS MISC        ACETAMINOPHEN (TYLENOL) 500 MG TABLET    Take 1-2 tablets by mouth.    AMLODIPINE (NORVASC) 10 MG TABLET    Take 10 mg by mouth once daily.    ASPIRIN (ECOTRIN) 81 MG EC TABLET    Take 81 mg by mouth once daily.    ATORVASTATIN (LIPITOR) 80 MG TABLET    Take 80 mg by mouth once daily.    BD ALCOHOL SWABS PADM        CARVEDILOL (COREG) 12.5 MG TABLET        CLONIDINE (CATAPRES) 0.1 MG TABLET    Take 1 tablet by mouth.    ERGOCALCIFEROL (ERGOCALCIFEROL)  50,000 UNIT CAP    Take 1 capsule by mouth.    FUROSEMIDE (LASIX) 20 MG TABLET        HYDRALAZINE (APRESOLINE) 100 MG TABLET        INSULIN ASPART PROTAMINE-INSULIN ASPART (NOVOLOG 70/30) 100 UNIT/ML (70-30) INPN PEN    Inject 40 Units into the skin every morning.    INSULIN ASPART U-100 (NOVOLOG) 100 UNIT/ML INJECTION    Inject 40 Units into the skin.    NIFEDIPINE (PROCARDIA XL) 60 MG (OSM) 24 HR TABLET    Take 60 mg by mouth once daily.    NITROGLYCERIN (NITROSTAT) 0.4 MG SL TABLET    Place 0.4 mg under the tongue every 5 (five) minutes as needed for Chest pain.    NOVOLIN 70/30 U-100 INSULIN 100 UNIT/ML (70-30) INJECTION        NOVOLOG MIX 70-30 U-100 INSULN 100 UNIT/ML (70-30) SOLN        PAZEO 0.7 % DROP        RANITIDINE (ZANTAC) 75 MG TABLET    Take 1 tablet by mouth.    XARELTO 2.5 MG TAB           Review of patient's allergies indicates:   Allergen Reactions    Lortab [hydrocodone-acetaminophen] Itching    Morphine Other (See Comments)     Hallucinations  Hallucinations         Past Surgical History:   Procedure Laterality Date    BREAST BIOPSY      benign, pt states she was about 27yrs old    CARDIAC SURGERY  2013     SECTION      HYSTERECTOMY      OOPHORECTOMY         Family History   Problem Relation Age of Onset    Hypertension Mother     Diabetes Mother     Hypertension Brother     Diabetes Brother     Seizures Son        Social History     Socioeconomic History    Marital status:      Spouse name: Not on file    Number of children: Not on file    Years of education: Not on file    Highest education level: Not on file   Occupational History    Not on file   Social Needs    Financial resource strain: Not on file    Food insecurity     Worry: Not on file     Inability: Not on file    Transportation needs     Medical: Not on file     Non-medical: Not on file   Tobacco Use    Smoking status: Never Smoker    Smokeless tobacco: Never Used   Substance and Sexual Activity     Alcohol use: No    Drug use: No    Sexual activity: Yes     Partners: Male   Lifestyle    Physical activity     Days per week: Not on file     Minutes per session: Not on file    Stress: Not on file   Relationships    Social connections     Talks on phone: Not on file     Gets together: Not on file     Attends [employer] service: Not on file     Active member of club or organization: Not on file     Attends meetings of clubs or organizations: Not on file     Relationship status: Not on file   Other Topics Concern    Not on file   Social History Narrative    Not on file       Vitals:    06/15/20 0850   BP: (!) 143/61   Pulse: 70   Weight: 94.8 kg (209 lb)   PainSc: 0-No pain   PainLoc: Foot       Review of Systems   Constitutional: Negative for chills and fever.   Respiratory: Negative for shortness of breath.    Cardiovascular: Negative for chest pain, palpitations, orthopnea, claudication and leg swelling.   Gastrointestinal: Negative for diarrhea, nausea and vomiting.   Musculoskeletal: Negative for joint pain.   Skin: Negative for rash.   Neurological: Positive for tingling and sensory change.   Psychiatric/Behavioral: Negative.              Objective:   PHYSICAL EXAM: Apperance: Alert and orient in no distress,well developed, and with good attention to grooming and body habits  Patient presents ambulating in tennis shoes.   LOWER EXTREMITY EXAM:  VASCULAR: Dorsalis pedis pulses 0/4(monphasic with doppler) bilateral and Posterior Tibial pulses 1/4 bilateral. Capillary fill time <4 seconds bilateral. No edema observed bilateral. Varicosities present bilateral. Skin temperature of the lower extremities is warm to cool, proximal to distal. Hair growth absent bilateral. (--) lymphangitis or (--) cellulitis noted right.  DERMATOLOGICAL: (-) edema, (-) erythema, (-) malodor, (-) drainage, (-) warmth to right foot.  Previous ulcer noted to right plantar hallux and 2nd toe closed with pink epithealized tissue.  The dorsum surface of the feet are soft and supple.  The plantar aspects of feet are dry and scaly. Webspaces 1,2,3,4 clean, dry and without evidence of break in skin integrity bilateral. Nails 1,2,3,4,5 bilateral elongated, thickened, and discolored with subungual debris.  NEUROLOGICAL: Light touch, sharp-dull, proprioception all present and equal bilaterally.  Vibratory sensation diminished at bilateral hallux. Protective sensation absent at 4/10 sites as tested with a [place]-Brandy 5.07 monofilament.   MUSCULOSKELETAL: Muscle strength is 5/5 for foot inverters, everters, plantarflexors, and dorsiflexors. Muscle tone is normal.       Assessment:   Type II diabetes mellitus with neurological manifestations    PVD (peripheral vascular disease)    Dermatophytosis of nail          Plan:   Type II diabetes mellitus with neurological manifestations    PVD (peripheral vascular disease)    Dermatophytosis of nail      I counseled the patient on her conditions, regarding findings of my examination, my impressions, and usual treatment plan.   Greater than 15 minutes of a 20 minute appointment spent on education about the diabetic foot, neuropathy, and prevention of limb loss.  Shoe inspection. Diabetic Foot Education. Patient reminded of the importance of good nutrition and blood sugar control to help prevent podiatric complications of diabetes. Patient instructed on proper foot hygeine. We discussed wearing proper shoe gear, daily foot inspections, never walking without protective shoe gear, never putting sharp instruments to feet.    With patient's permission, nails 1-5 bilateral were debrided/trimmed in length and thickness aggressively to their soft tissue attachment mechanically and with electric , removing all offending nail and debris. Patient relates relief following the procedure.  Patient instructed to continue Eloquis as prescribed.   Patient  will continue to monitor the areas daily, inspect feet, wear  protective shoe gear when ambulatory, moisturizer to maintain skin integrity. Patient reminded of the importance of good nutrition and blood sugar control to help prevent podiatric complications of diabetes.  Patient to return 2 months or sooner if needed.                 Viet Muir DPM  [facility]

## 2020-08-21 ENCOUNTER — OFFICE VISIT (OUTPATIENT)
Dept: PODIATRY | Facility: CLINIC | Age: 60
End: 2020-08-21
Payer: MEDICARE

## 2020-08-21 VITALS
SYSTOLIC BLOOD PRESSURE: 124 MMHG | DIASTOLIC BLOOD PRESSURE: 54 MMHG | BODY MASS INDEX: 33.59 KG/M2 | HEART RATE: 69 BPM | HEIGHT: 66 IN | WEIGHT: 209 LBS

## 2020-08-21 DIAGNOSIS — E11.49 TYPE II DIABETES MELLITUS WITH NEUROLOGICAL MANIFESTATIONS: Primary | ICD-10-CM

## 2020-08-21 DIAGNOSIS — B35.1 DERMATOPHYTOSIS OF NAIL: ICD-10-CM

## 2020-08-21 DIAGNOSIS — L84 PRE-ULCERATIVE CALLUSES: ICD-10-CM

## 2020-08-21 DIAGNOSIS — I73.9 PVD (PERIPHERAL VASCULAR DISEASE): ICD-10-CM

## 2020-08-21 PROCEDURE — 99999 PR PBB SHADOW E&M-EST. PATIENT-LVL IV: ICD-10-PCS | Mod: PBBFAC,,, | Performed by: PODIATRIST

## 2020-08-21 PROCEDURE — 11721 DEBRIDE NAIL 6 OR MORE: CPT | Mod: Q8,59,S$GLB, | Performed by: PODIATRIST

## 2020-08-21 PROCEDURE — 3044F PR MOST RECENT HEMOGLOBIN A1C LEVEL <7.0%: ICD-10-PCS | Mod: CPTII,S$GLB,, | Performed by: PODIATRIST

## 2020-08-21 PROCEDURE — 3044F HG A1C LEVEL LT 7.0%: CPT | Mod: CPTII,S$GLB,, | Performed by: PODIATRIST

## 2020-08-21 PROCEDURE — 99213 PR OFFICE/OUTPT VISIT, EST, LEVL III, 20-29 MIN: ICD-10-PCS | Mod: 25,S$GLB,, | Performed by: PODIATRIST

## 2020-08-21 PROCEDURE — 3008F PR BODY MASS INDEX (BMI) DOCUMENTED: ICD-10-PCS | Mod: CPTII,S$GLB,, | Performed by: PODIATRIST

## 2020-08-21 PROCEDURE — 99213 OFFICE O/P EST LOW 20 MIN: CPT | Mod: 25,S$GLB,, | Performed by: PODIATRIST

## 2020-08-21 PROCEDURE — 3008F BODY MASS INDEX DOCD: CPT | Mod: CPTII,S$GLB,, | Performed by: PODIATRIST

## 2020-08-21 PROCEDURE — 11055 PR TRIM HYPERKERATOTIC SKIN LESION, ONE: ICD-10-PCS | Mod: Q8,S$GLB,, | Performed by: PODIATRIST

## 2020-08-21 PROCEDURE — 11055 PARING/CUTG B9 HYPRKER LES 1: CPT | Mod: Q8,S$GLB,, | Performed by: PODIATRIST

## 2020-08-21 PROCEDURE — 11721 PR DEBRIDEMENT OF NAILS, 6 OR MORE: ICD-10-PCS | Mod: Q8,59,S$GLB, | Performed by: PODIATRIST

## 2020-08-21 PROCEDURE — 99999 PR PBB SHADOW E&M-EST. PATIENT-LVL IV: CPT | Mod: PBBFAC,,, | Performed by: PODIATRIST

## 2020-08-21 NOTE — PROGRESS NOTES
Subjective:     Patient ID: Julia Preston is a 59 y.o. female.    Chief Complaint: Nail Care (no c/o pain.wears tennis shoes with socks. diabetic Pt. last seen on 07/13/20 with PCP Dr. Lutz)    Julia is a 59 y.o. female who presents to the clinic for evaluation and treatment of high risk feet. Julia has a past medical history of CHF (congestive heart failure), Congestive heart failure, Congestive heart failure (2018), Diabetes mellitus, type 2, GERD (gastroesophageal reflux disease), High cholesterol, Hyperlipidemia, Hypertension, Pneumonia (6/2017 or 7/2017 per the patient), and Pneumonia (03/2018). The patient's chief complaint is long, thick toenails. This patient has documented high risk feet requiring routine maintenance secondary to diabetes mellitis and those secondary complications of diabetes, as mentioned.      PCP: Mana Garsia MD    Date Last Seen by PCP: 07/31/2020    Current shoe gear:  Affected Foot: Diabetic shoes     Unaffected Foot: Diabetic shoes      Patient Active Problem List   Diagnosis    GERD (gastroesophageal reflux disease)    Type II diabetes mellitus with peripheral circulatory disorder    Hyperlipidemia    Hypertension    PVD (peripheral vascular disease)    Severe obesity (BMI 35.0-39.9) with comorbidity       Medication List with Changes/Refills   Current Medications    ACCU-CHEK SIOMARA PLUS TEST STRP STRP        ACCU-CHEK SOFTCLIX LANCETS MISC        ACETAMINOPHEN (TYLENOL) 500 MG TABLET    Take 1-2 tablets by mouth.    AMLODIPINE (NORVASC) 10 MG TABLET    Take 10 mg by mouth once daily.    ASPIRIN (ECOTRIN) 81 MG EC TABLET    Take 81 mg by mouth once daily.    ATORVASTATIN (LIPITOR) 80 MG TABLET    Take 80 mg by mouth once daily.    BD ALCOHOL SWABS PADM        CARVEDILOL (COREG) 12.5 MG TABLET        CLONIDINE (CATAPRES) 0.1 MG TABLET    Take 1 tablet by mouth.    ERGOCALCIFEROL (ERGOCALCIFEROL) 50,000 UNIT CAP    Take 1 capsule by mouth.    FUROSEMIDE (LASIX)  20 MG TABLET        HYDRALAZINE (APRESOLINE) 100 MG TABLET        INSULIN ASPART PROTAMINE-INSULIN ASPART (NOVOLOG 70/30) 100 UNIT/ML (70-30) INPN PEN    Inject 40 Units into the skin every morning.    INSULIN ASPART U-100 (NOVOLOG) 100 UNIT/ML INJECTION    Inject 40 Units into the skin.    NIFEDIPINE (PROCARDIA XL) 60 MG (OSM) 24 HR TABLET    Take 60 mg by mouth once daily.    NITROGLYCERIN (NITROSTAT) 0.4 MG SL TABLET    Place 0.4 mg under the tongue every 5 (five) minutes as needed for Chest pain.    NOVOLIN 70/30 U-100 INSULIN 100 UNIT/ML (70-30) INJECTION        NOVOLOG MIX 70-30 U-100 INSULN 100 UNIT/ML (70-30) SOLN        PAZEO 0.7 % DROP        RANITIDINE (ZANTAC) 75 MG TABLET    Take 1 tablet by mouth.    XARELTO 2.5 MG TAB           Review of patient's allergies indicates:   Allergen Reactions    Lortab [hydrocodone-acetaminophen] Itching    Morphine Other (See Comments)     Hallucinations  Hallucinations         Past Surgical History:   Procedure Laterality Date    BREAST BIOPSY      benign, pt states she was about 27yrs old    CARDIAC SURGERY  2013     SECTION      HYSTERECTOMY      OOPHORECTOMY         Family History   Problem Relation Age of Onset    Hypertension Mother     Diabetes Mother     Hypertension Brother     Diabetes Brother     Seizures Son        Social History     Socioeconomic History    Marital status:      Spouse name: Not on file    Number of children: Not on file    Years of education: Not on file    Highest education level: Not on file   Occupational History    Not on file   Social Needs    Financial resource strain: Not on file    Food insecurity     Worry: Not on file     Inability: Not on file    Transportation needs     Medical: Not on file     Non-medical: Not on file   Tobacco Use    Smoking status: Never Smoker    Smokeless tobacco: Never Used   Substance and Sexual Activity    Alcohol use: No    Drug use: No    Sexual activity: Yes      "Partners: Male   Lifestyle    Physical activity     Days per week: Not on file     Minutes per session: Not on file    Stress: Not on file   Relationships    Social connections     Talks on phone: Not on file     Gets together: Not on file     Attends [employer] service: Not on file     Active member of club or organization: Not on file     Attends meetings of clubs or organizations: Not on file     Relationship status: Not on file   Other Topics Concern    Not on file   Social History Narrative    Not on file       Vitals:    08/21/20 0826   BP: (!) 124/54   Pulse: 69   Weight: 94.8 kg (209 lb)   Height: 5' 6" (1.676 m)   PainSc: 0-No pain   PainLoc: Foot       Review of Systems   Constitutional: Negative for chills and fever.   Respiratory: Negative for shortness of breath.    Cardiovascular: Negative for chest pain, palpitations, orthopnea, claudication and leg swelling.   Gastrointestinal: Negative for diarrhea, nausea and vomiting.   Musculoskeletal: Negative for joint pain.   Skin: Negative for rash.   Neurological: Positive for tingling and sensory change.   Psychiatric/Behavioral: Negative.              Objective:   PHYSICAL EXAM: Apperance: Alert and orient in no distress,well developed, and with good attention to grooming and body habits  Patient presents ambulating in tennis shoes.   LOWER EXTREMITY EXAM:  VASCULAR: Dorsalis pedis pulses 0/4(monphasic with doppler) bilateral and Posterior Tibial pulses 1/4 bilateral. Capillary fill time <4 seconds bilateral. No edema observed bilateral. Varicosities present bilateral. Skin temperature of the lower extremities is warm to cool, proximal to distal. Hair growth absent bilateral.   DERMATOLOGICAL: No skin rashes, subcutaneous nodules, lesions, or ulcers observed bilateral. Nails 1,2,3,4,5 bilateral elongated, thickened, and discolored with subungual debris. Webspaces 1,2,3,4 clean, dry and without evidence of break in skin integrity bilateral. Mild " hyperkeratotic tissue noted to left plantar hallux.   NEUROLOGICAL: Light touch, sharp-dull, proprioception all present and equal bilaterally.  Vibratory sensation diminished at bilateral hallux. Protective sensation absent at 4/10 sites as tested with a [place]-Brandy 5.07 monofilament.   MUSCULOSKELETAL: Muscle strength is 5/5 for foot inverters, everters, plantarflexors, and dorsiflexors. Muscle tone is normal.       Assessment:   Type II diabetes mellitus with neurological manifestations    PVD (peripheral vascular disease)    Dermatophytosis of nail    Pre-ulcerative calluses - Left Foot          Plan:   Type II diabetes mellitus with neurological manifestations    PVD (peripheral vascular disease)    Dermatophytosis of nail    Pre-ulcerative calluses - Left Foot      I counseled the patient on her conditions, regarding findings of my examination, my impressions, and usual treatment plan.   Greater than 50% of this visit spent on counseling and coordination of care.  Greater than 15 minutes of a 20 minute appointment spent on education about the diabetic foot, neuropathy, and prevention of limb loss.  Shoe inspection. Diabetic Foot Education. Patient reminded of the importance of good nutrition and blood sugar control to help prevent podiatric complications of diabetes. Patient instructed on proper foot hygeine. We discussed wearing proper shoe gear, daily foot inspections, never walking without protective shoe gear, never putting sharp instruments to feet.    With patient's permission, nails 1-5 bilateral were debrided/trimmed in length and thickness aggressively to their soft tissue attachment mechanically and with electric , removing all offending nail and debris. Patient relates relief following the procedure.  With patient's permission, left hallux callus trimmed in thickness with #15 blade in thickness without incident.   Patient  will continue to monitor the areas daily, inspect feet, wear  protective shoe gear when ambulatory, moisturizer to maintain skin integrity. Patient reminded of the importance of good nutrition and blood sugar control to help prevent podiatric complications of diabetes.  Patient to return 2 months or sooner if needed.                 Viet Muir DPM  [facility]

## 2020-10-23 ENCOUNTER — OFFICE VISIT (OUTPATIENT)
Dept: PODIATRY | Facility: CLINIC | Age: 60
End: 2020-10-23
Payer: MEDICARE

## 2020-10-23 VITALS
DIASTOLIC BLOOD PRESSURE: 54 MMHG | SYSTOLIC BLOOD PRESSURE: 121 MMHG | WEIGHT: 209 LBS | BODY MASS INDEX: 33.59 KG/M2 | HEART RATE: 59 BPM | HEIGHT: 66 IN

## 2020-10-23 DIAGNOSIS — B35.1 DERMATOPHYTOSIS OF NAIL: ICD-10-CM

## 2020-10-23 DIAGNOSIS — E11.49 TYPE II DIABETES MELLITUS WITH NEUROLOGICAL MANIFESTATIONS: Primary | ICD-10-CM

## 2020-10-23 DIAGNOSIS — I73.9 PVD (PERIPHERAL VASCULAR DISEASE): ICD-10-CM

## 2020-10-23 DIAGNOSIS — L84 PRE-ULCERATIVE CALLUSES: ICD-10-CM

## 2020-10-23 PROCEDURE — 99999 PR PBB SHADOW E&M-EST. PATIENT-LVL IV: ICD-10-PCS | Mod: PBBFAC,,, | Performed by: PODIATRIST

## 2020-10-23 PROCEDURE — 99499 NO LOS: ICD-10-PCS | Mod: S$GLB,,, | Performed by: PODIATRIST

## 2020-10-23 PROCEDURE — 99499 UNLISTED E&M SERVICE: CPT | Mod: S$GLB,,, | Performed by: PODIATRIST

## 2020-10-23 PROCEDURE — 11721 PR DEBRIDEMENT OF NAILS, 6 OR MORE: ICD-10-PCS | Mod: Q8,S$GLB,, | Performed by: PODIATRIST

## 2020-10-23 PROCEDURE — 11721 DEBRIDE NAIL 6 OR MORE: CPT | Mod: Q8,S$GLB,, | Performed by: PODIATRIST

## 2020-10-23 PROCEDURE — 99999 PR PBB SHADOW E&M-EST. PATIENT-LVL IV: CPT | Mod: PBBFAC,,, | Performed by: PODIATRIST

## 2020-10-23 NOTE — PROGRESS NOTES
Subjective:     Patient ID: Julia Preston is a 60 y.o. female.    Chief Complaint: Nail Care (no c/o pain. wears diabetic shoes with socks. diabetic Pt. last seen on 10/13/20 with PCP Dr. Garsia.)    Julia is a 60 y.o. female who presents to the clinic for evaluation and treatment of high risk feet. Julia has a past medical history of CHF (congestive heart failure), Congestive heart failure, Congestive heart failure (2018), Diabetes mellitus, type 2, GERD (gastroesophageal reflux disease), High cholesterol, Hyperlipidemia, Hypertension, Pneumonia (6/2017 or 7/2017 per the patient), and Pneumonia (03/2018). The patient's chief complaint is long, thick toenails. This patient has documented high risk feet requiring routine maintenance secondary to diabetes mellitis and those secondary complications of diabetes, as mentioned.      PCP: Mana Garsia MD    Date Last Seen by PCP: 10/13/2020    Current shoe gear:  Affected Foot: Diabetic shoes     Unaffected Foot: Diabetic shoes      Patient Active Problem List   Diagnosis    GERD (gastroesophageal reflux disease)    Type II diabetes mellitus with peripheral circulatory disorder    Hyperlipidemia    Hypertension    PVD (peripheral vascular disease)    Severe obesity (BMI 35.0-39.9) with comorbidity       Medication List with Changes/Refills   Current Medications    ACCU-CHEK [place] PLUS TEST STRP STRP        ACCU-CHEK SOFTCLIX LANCETS MISC        ACETAMINOPHEN (TYLENOL) 500 MG TABLET    Take 1-2 tablets by mouth.    AMLODIPINE (NORVASC) 10 MG TABLET    Take 10 mg by mouth once daily.    ASPIRIN (ECOTRIN) 81 MG EC TABLET    Take 81 mg by mouth once daily.    ATORVASTATIN (LIPITOR) 80 MG TABLET    Take 80 mg by mouth once daily.    BD ALCOHOL SWABS PADM        CARVEDILOL (COREG) 12.5 MG TABLET        CLONIDINE (CATAPRES) 0.1 MG TABLET    Take 1 tablet by mouth.    ERGOCALCIFEROL (ERGOCALCIFEROL) 50,000 UNIT CAP    Take 1 capsule by mouth.    FUROSEMIDE  (LASIX) 20 MG TABLET        HYDRALAZINE (APRESOLINE) 100 MG TABLET        INSULIN ASPART PROTAMINE-INSULIN ASPART (NOVOLOG 70/30) 100 UNIT/ML (70-30) INPN PEN    Inject 40 Units into the skin every morning.    INSULIN ASPART U-100 (NOVOLOG) 100 UNIT/ML INJECTION    Inject 40 Units into the skin.    NIFEDIPINE (PROCARDIA XL) 60 MG (OSM) 24 HR TABLET    Take 60 mg by mouth once daily.    NITROGLYCERIN (NITROSTAT) 0.4 MG SL TABLET    Place 0.4 mg under the tongue every 5 (five) minutes as needed for Chest pain.    NOVOLIN 70/30 U-100 INSULIN 100 UNIT/ML (70-30) INJECTION        NOVOLOG MIX 70-30 U-100 INSULN 100 UNIT/ML (70-30) SOLN        PAZEO 0.7 % DROP        RANITIDINE (ZANTAC) 75 MG TABLET    Take 1 tablet by mouth.    XARELTO 2.5 MG TAB           Review of patient's allergies indicates:   Allergen Reactions    Lortab [hydrocodone-acetaminophen] Itching    Morphine Other (See Comments)     Hallucinations  Hallucinations         Past Surgical History:   Procedure Laterality Date    BREAST BIOPSY      benign, pt states she was about 27yrs old    CARDIAC SURGERY  2013     SECTION      HYSTERECTOMY      OOPHORECTOMY         Family History   Problem Relation Age of Onset    Hypertension Mother     Diabetes Mother     Hypertension Brother     Diabetes Brother     Seizures Son        Social History     Socioeconomic History    Marital status:      Spouse name: Not on file    Number of children: Not on file    Years of education: Not on file    Highest education level: Not on file   Occupational History    Not on file   Social Needs    Financial resource strain: Not on file    Food insecurity     Worry: Not on file     Inability: Not on file    Transportation needs     Medical: Not on file     Non-medical: Not on file   Tobacco Use    Smoking status: Never Smoker    Smokeless tobacco: Never Used   Substance and Sexual Activity    Alcohol use: No    Drug use: No    Sexual activity:  "Yes     Partners: Male   Lifestyle    Physical activity     Days per week: Not on file     Minutes per session: Not on file    Stress: Not on file   Relationships    Social connections     Talks on phone: Not on file     Gets together: Not on file     Attends [employer] service: Not on file     Active member of club or organization: Not on file     Attends meetings of clubs or organizations: Not on file     Relationship status: Not on file   Other Topics Concern    Not on file   Social History Narrative    Not on file       Vitals:    10/23/20 0827   BP: (!) 121/54   Pulse: (!) 59   Weight: 94.8 kg (209 lb)   Height: 5' 6" (1.676 m)   PainSc: 0-No pain   PainLoc: Foot       Review of Systems   Constitutional: Negative for chills and fever.   Respiratory: Negative for shortness of breath.    Cardiovascular: Negative for chest pain, palpitations, orthopnea, claudication and leg swelling.   Gastrointestinal: Negative for diarrhea, nausea and vomiting.   Musculoskeletal: Negative for joint pain.   Skin: Negative for rash.   Neurological: Positive for tingling and sensory change.   Psychiatric/Behavioral: Negative.              Objective:   PHYSICAL EXAM: Apperance: Alert and orient in no distress,well developed, and with good attention to grooming and body habits  Patient presents ambulating in tennis shoes.   LOWER EXTREMITY EXAM:  VASCULAR: Dorsalis pedis pulses 0/4(monphasic with doppler) bilateral and Posterior Tibial pulses 1/4 bilateral. Capillary fill time <4 seconds bilateral. No edema observed bilateral. Varicosities present bilateral. Skin temperature of the lower extremities is warm to cool, proximal to distal. Hair growth absent bilateral.   DERMATOLOGICAL: No skin rashes, subcutaneous nodules, lesions, or ulcers observed bilateral. Nails 1,2,3,4,5 bilateral elongated, thickened, and discolored with subungual debris. Webspaces 1,2,3,4 clean, dry and without evidence of break in skin integrity bilateral. " Minimal hyperkeratotic tissue noted to left plantar hallux.   NEUROLOGICAL: Light touch, sharp-dull, proprioception all present and equal bilaterally.  Vibratory sensation diminished at bilateral hallux. Protective sensation absent at 4/10 sites as tested with a [place]-Brandy 5.07 monofilament.   MUSCULOSKELETAL: Muscle strength is 5/5 for foot inverters, everters, plantarflexors, and dorsiflexors. Muscle tone is normal.       Assessment:   Type II diabetes mellitus with neurological manifestations    PVD (peripheral vascular disease)    Dermatophytosis of nail    Pre-ulcerative calluses          Plan:   Type II diabetes mellitus with neurological manifestations    PVD (peripheral vascular disease)    Dermatophytosis of nail    Pre-ulcerative calluses      I counseled the patient on her conditions, regarding findings of my examination, my impressions, and usual treatment plan.   Greater than 15 minutes of a 20 minute appointment spent on education about the diabetic foot, neuropathy, and prevention of limb loss.  Shoe inspection. Diabetic Foot Education. Patient reminded of the importance of good nutrition and blood sugar control to help prevent podiatric complications of diabetes. Patient instructed on proper foot hygeine. We discussed wearing proper shoe gear, daily foot inspections, never walking without protective shoe gear, never putting sharp instruments to feet.    With patient's permission, nails 1-5 bilateral were debrided/trimmed in length and thickness aggressively to their soft tissue attachment mechanically and with electric , removing all offending nail and debris. Patient relates relief following the procedure.  Patient  will continue to monitor the areas daily, inspect feet, wear protective shoe gear when ambulatory, moisturizer to maintain skin integrity. Patient reminded of the importance of good nutrition and blood sugar control to help prevent podiatric complications of  diabetes.  Patient to return 2 months or sooner if needed.                 Viet Muir DPM  [facility]

## 2020-12-10 ENCOUNTER — TELEPHONE (OUTPATIENT)
Dept: OBSTETRICS AND GYNECOLOGY | Facility: CLINIC | Age: 60
End: 2020-12-10

## 2020-12-10 DIAGNOSIS — Z12.31 ENCOUNTER FOR SCREENING MAMMOGRAM FOR MALIGNANT NEOPLASM OF BREAST: Primary | ICD-10-CM

## 2020-12-10 NOTE — TELEPHONE ENCOUNTER
----- Message from Selma Underwood sent at 12/10/2020  1:41 PM CST -----  Regarding: Order  Contact: [phone]  Would like to consult with the nurse, patient would like to get an Order for a Mammogram, please call back thanks sj   .Type:  Mammogram    Caller is requesting to schedule their annual mammogram appointment.  Order is not listed in EPIC.  Please enter order and contact patient to schedule.  Name of Caller:  Ms Preston  Where would they like the mammogram performed? [facility]  Would the patient rather a call back or a response via [facility]? callback  Best Call Back Number:[phone]  Additional Information:

## 2021-01-08 ENCOUNTER — OFFICE VISIT (OUTPATIENT)
Dept: PODIATRY | Facility: CLINIC | Age: 61
End: 2021-01-08
Payer: MEDICARE

## 2021-01-08 VITALS
SYSTOLIC BLOOD PRESSURE: 123 MMHG | BODY MASS INDEX: 33.66 KG/M2 | WEIGHT: 209.44 LBS | HEIGHT: 66 IN | DIASTOLIC BLOOD PRESSURE: 57 MMHG | HEART RATE: 64 BPM

## 2021-01-08 DIAGNOSIS — I73.9 PVD (PERIPHERAL VASCULAR DISEASE): ICD-10-CM

## 2021-01-08 DIAGNOSIS — E11.49 TYPE II DIABETES MELLITUS WITH NEUROLOGICAL MANIFESTATIONS: Primary | ICD-10-CM

## 2021-01-08 DIAGNOSIS — B35.1 DERMATOPHYTOSIS OF NAIL: ICD-10-CM

## 2021-01-08 PROCEDURE — 99499 NO LOS: ICD-10-PCS | Mod: S$GLB,,, | Performed by: PODIATRIST

## 2021-01-08 PROCEDURE — 99999 PR PBB SHADOW E&M-EST. PATIENT-LVL IV: ICD-10-PCS | Mod: PBBFAC,,, | Performed by: PODIATRIST

## 2021-01-08 PROCEDURE — 99999 PR PBB SHADOW E&M-EST. PATIENT-LVL IV: CPT | Mod: PBBFAC,,, | Performed by: PODIATRIST

## 2021-01-08 PROCEDURE — 11721 DEBRIDE NAIL 6 OR MORE: CPT | Mod: Q8,S$GLB,, | Performed by: PODIATRIST

## 2021-01-08 PROCEDURE — 3008F PR BODY MASS INDEX (BMI) DOCUMENTED: ICD-10-PCS | Mod: CPTII,S$GLB,, | Performed by: PODIATRIST

## 2021-01-08 PROCEDURE — 11721 PR DEBRIDEMENT OF NAILS, 6 OR MORE: ICD-10-PCS | Mod: Q8,S$GLB,, | Performed by: PODIATRIST

## 2021-01-08 PROCEDURE — 99499 UNLISTED E&M SERVICE: CPT | Mod: S$GLB,,, | Performed by: PODIATRIST

## 2021-01-08 PROCEDURE — 3008F BODY MASS INDEX DOCD: CPT | Mod: CPTII,S$GLB,, | Performed by: PODIATRIST

## 2021-02-01 ENCOUNTER — HOSPITAL ENCOUNTER (OUTPATIENT)
Dept: RADIOLOGY | Facility: HOSPITAL | Age: 61
Discharge: HOME | End: 2021-02-01
Attending: OBSTETRICS & GYNECOLOGY
Payer: MEDICARE

## 2021-02-01 VITALS — HEIGHT: 66 IN | WEIGHT: 209.44 LBS | BODY MASS INDEX: 33.66 KG/M2

## 2021-02-01 DIAGNOSIS — Z12.31 ENCOUNTER FOR SCREENING MAMMOGRAM FOR MALIGNANT NEOPLASM OF BREAST: ICD-10-CM

## 2021-02-01 PROCEDURE — 77067 SCR MAMMO BI INCL CAD: CPT | Mod: 26,,, | Performed by: RADIOLOGY

## 2021-02-01 PROCEDURE — 77067 MAMMO DIGITAL SCREENING BILAT: ICD-10-PCS | Mod: 26,,, | Performed by: RADIOLOGY

## 2021-02-01 PROCEDURE — 77067 SCR MAMMO BI INCL CAD: CPT | Mod: TC

## 2021-03-08 ENCOUNTER — OFFICE VISIT (OUTPATIENT)
Dept: PODIATRY | Facility: CLINIC | Age: 61
End: 2021-03-08
Payer: MEDICARE

## 2021-03-08 ENCOUNTER — LAB VISIT (OUTPATIENT)
Dept: LAB | Facility: HOSPITAL | Age: 61
End: 2021-03-08
Attending: PODIATRIST
Payer: MEDICARE

## 2021-03-08 VITALS
DIASTOLIC BLOOD PRESSURE: 63 MMHG | WEIGHT: 209.44 LBS | BODY MASS INDEX: 33.66 KG/M2 | HEIGHT: 66 IN | SYSTOLIC BLOOD PRESSURE: 142 MMHG | HEART RATE: 67 BPM

## 2021-03-08 DIAGNOSIS — E11.51 TYPE II DIABETES MELLITUS WITH PERIPHERAL CIRCULATORY DISORDER: ICD-10-CM

## 2021-03-08 DIAGNOSIS — M10.9 ACUTE GOUT OF LEFT FOOT, UNSPECIFIED CAUSE: ICD-10-CM

## 2021-03-08 DIAGNOSIS — B35.1 DERMATOPHYTOSIS OF NAIL: ICD-10-CM

## 2021-03-08 DIAGNOSIS — I73.9 PVD (PERIPHERAL VASCULAR DISEASE): ICD-10-CM

## 2021-03-08 DIAGNOSIS — E11.49 TYPE II DIABETES MELLITUS WITH NEUROLOGICAL MANIFESTATIONS: ICD-10-CM

## 2021-03-08 DIAGNOSIS — M10.9 ACUTE GOUT OF LEFT FOOT, UNSPECIFIED CAUSE: Primary | ICD-10-CM

## 2021-03-08 LAB
CRP SERPL-MCNC: 53.2 MG/L (ref 0–8.2)
ERYTHROCYTE [SEDIMENTATION RATE] IN BLOOD BY WESTERGREN METHOD: >120 MM/HR (ref 0–36)
URATE SERPL-MCNC: 11.5 MG/DL (ref 2.4–5.7)

## 2021-03-08 PROCEDURE — 99999 PR PBB SHADOW E&M-EST. PATIENT-LVL IV: ICD-10-PCS | Mod: PBBFAC,,, | Performed by: PODIATRIST

## 2021-03-08 PROCEDURE — 99999 PR PBB SHADOW E&M-EST. PATIENT-LVL IV: CPT | Mod: PBBFAC,,, | Performed by: PODIATRIST

## 2021-03-08 PROCEDURE — 99213 OFFICE O/P EST LOW 20 MIN: CPT | Mod: 25,S$GLB,, | Performed by: PODIATRIST

## 2021-03-08 PROCEDURE — 11721 PR DEBRIDEMENT OF NAILS, 6 OR MORE: ICD-10-PCS | Mod: Q8,S$GLB,, | Performed by: PODIATRIST

## 2021-03-08 PROCEDURE — 86140 C-REACTIVE PROTEIN: CPT | Performed by: PODIATRIST

## 2021-03-08 PROCEDURE — 99213 PR OFFICE/OUTPT VISIT, EST, LEVL III, 20-29 MIN: ICD-10-PCS | Mod: 25,S$GLB,, | Performed by: PODIATRIST

## 2021-03-08 PROCEDURE — 11721 DEBRIDE NAIL 6 OR MORE: CPT | Mod: Q8,S$GLB,, | Performed by: PODIATRIST

## 2021-03-08 PROCEDURE — 3044F PR MOST RECENT HEMOGLOBIN A1C LEVEL <7.0%: ICD-10-PCS | Mod: CPTII,S$GLB,, | Performed by: PODIATRIST

## 2021-03-08 PROCEDURE — 84550 ASSAY OF BLOOD/URIC ACID: CPT | Performed by: PODIATRIST

## 2021-03-08 PROCEDURE — 36415 COLL VENOUS BLD VENIPUNCTURE: CPT | Performed by: PODIATRIST

## 2021-03-08 PROCEDURE — 3044F HG A1C LEVEL LT 7.0%: CPT | Mod: CPTII,S$GLB,, | Performed by: PODIATRIST

## 2021-03-08 PROCEDURE — 3008F BODY MASS INDEX DOCD: CPT | Mod: CPTII,S$GLB,, | Performed by: PODIATRIST

## 2021-03-08 PROCEDURE — 85652 RBC SED RATE AUTOMATED: CPT | Performed by: PODIATRIST

## 2021-03-08 PROCEDURE — 3008F PR BODY MASS INDEX (BMI) DOCUMENTED: ICD-10-PCS | Mod: CPTII,S$GLB,, | Performed by: PODIATRIST

## 2021-03-09 DIAGNOSIS — M10.9 ACUTE GOUT OF LEFT FOOT, UNSPECIFIED CAUSE: Primary | ICD-10-CM

## 2021-03-09 RX ORDER — COLCHICINE 0.6 MG/1
0.6 TABLET ORAL DAILY
Qty: 6 TABLET | Refills: 0 | Status: SHIPPED | OUTPATIENT
Start: 2021-03-09 | End: 2022-06-20

## 2021-03-09 RX ORDER — ALLOPURINOL 300 MG/1
300 TABLET ORAL DAILY
Qty: 30 TABLET | Refills: 0 | Status: SHIPPED | OUTPATIENT
Start: 2021-03-09 | End: 2021-03-29

## 2021-03-09 RX ORDER — METHYLPREDNISOLONE 4 MG/1
4 TABLET ORAL DAILY
Qty: 1 PACKAGE | Refills: 0 | Status: SHIPPED | OUTPATIENT
Start: 2021-03-09 | End: 2021-03-29

## 2021-03-29 ENCOUNTER — OFFICE VISIT (OUTPATIENT)
Dept: OBSTETRICS AND GYNECOLOGY | Facility: CLINIC | Age: 61
End: 2021-03-29
Payer: MEDICARE

## 2021-03-29 VITALS
DIASTOLIC BLOOD PRESSURE: 50 MMHG | BODY MASS INDEX: 37.88 KG/M2 | SYSTOLIC BLOOD PRESSURE: 118 MMHG | WEIGHT: 235.69 LBS | HEIGHT: 66 IN

## 2021-03-29 DIAGNOSIS — Z12.31 SCREENING MAMMOGRAM, ENCOUNTER FOR: ICD-10-CM

## 2021-03-29 DIAGNOSIS — Z13.820 SCREENING FOR OSTEOPOROSIS: ICD-10-CM

## 2021-03-29 DIAGNOSIS — Z12.4 SCREENING FOR CERVICAL CANCER: ICD-10-CM

## 2021-03-29 DIAGNOSIS — N95.8 OTHER SPECIFIED MENOPAUSAL AND PERIMENOPAUSAL DISORDERS: ICD-10-CM

## 2021-03-29 DIAGNOSIS — Z01.419 ENCOUNTER FOR GYNECOLOGICAL EXAMINATION (GENERAL) (ROUTINE) WITHOUT ABNORMAL FINDINGS: Primary | ICD-10-CM

## 2021-03-29 PROCEDURE — 1126F PR PAIN SEVERITY QUANTIFIED, NO PAIN PRESENT: ICD-10-PCS | Mod: S$GLB,,, | Performed by: OBSTETRICS & GYNECOLOGY

## 2021-03-29 PROCEDURE — G0101 PR CA SCREEN;PELVIC/BREAST EXAM: ICD-10-PCS | Mod: S$GLB,,, | Performed by: OBSTETRICS & GYNECOLOGY

## 2021-03-29 PROCEDURE — 3008F PR BODY MASS INDEX (BMI) DOCUMENTED: ICD-10-PCS | Mod: CPTII,S$GLB,, | Performed by: OBSTETRICS & GYNECOLOGY

## 2021-03-29 PROCEDURE — 1126F AMNT PAIN NOTED NONE PRSNT: CPT | Mod: S$GLB,,, | Performed by: OBSTETRICS & GYNECOLOGY

## 2021-03-29 PROCEDURE — G0101 CA SCREEN;PELVIC/BREAST EXAM: HCPCS | Mod: S$GLB,,, | Performed by: OBSTETRICS & GYNECOLOGY

## 2021-03-29 PROCEDURE — 99999 PR PBB SHADOW E&M-EST. PATIENT-LVL IV: ICD-10-PCS | Mod: PBBFAC,,, | Performed by: OBSTETRICS & GYNECOLOGY

## 2021-03-29 PROCEDURE — 99999 PR PBB SHADOW E&M-EST. PATIENT-LVL IV: CPT | Mod: PBBFAC,,, | Performed by: OBSTETRICS & GYNECOLOGY

## 2021-03-29 PROCEDURE — 3008F BODY MASS INDEX DOCD: CPT | Mod: CPTII,S$GLB,, | Performed by: OBSTETRICS & GYNECOLOGY

## 2021-03-29 RX ORDER — PANTOPRAZOLE SODIUM 40 MG/1
40 TABLET, DELAYED RELEASE ORAL
COMMUNITY
Start: 2020-07-01

## 2021-03-29 RX ORDER — FLUTICASONE PROPIONATE 50 MCG
1 SPRAY, SUSPENSION (ML) NASAL
COMMUNITY

## 2021-03-29 RX ORDER — UBIDECARENONE 75 MG
500 CAPSULE ORAL
COMMUNITY
Start: 2021-01-15

## 2021-03-29 RX ORDER — FOLIC ACID 1 MG/1
1 TABLET ORAL
COMMUNITY
Start: 2020-11-13 | End: 2021-11-14

## 2021-04-12 ENCOUNTER — TELEPHONE (OUTPATIENT)
Dept: OBSTETRICS AND GYNECOLOGY | Facility: CLINIC | Age: 61
End: 2021-04-12

## 2021-04-12 ENCOUNTER — APPOINTMENT (OUTPATIENT)
Dept: RADIOLOGY | Facility: HOSPITAL | Age: 61
End: 2021-04-12
Attending: OBSTETRICS & GYNECOLOGY
Payer: MEDICARE

## 2021-04-12 DIAGNOSIS — N95.8 OTHER SPECIFIED MENOPAUSAL AND PERIMENOPAUSAL DISORDERS: ICD-10-CM

## 2021-04-12 DIAGNOSIS — Z13.820 SCREENING FOR OSTEOPOROSIS: ICD-10-CM

## 2021-04-12 PROCEDURE — 77080 DXA BONE DENSITY AXIAL: CPT | Mod: TC

## 2021-04-12 PROCEDURE — 77080 DEXA BONE DENSITY SPINE HIP: ICD-10-PCS | Mod: 26,,, | Performed by: RADIOLOGY

## 2021-04-12 PROCEDURE — 77080 DXA BONE DENSITY AXIAL: CPT | Mod: 26,,, | Performed by: RADIOLOGY

## 2021-05-19 ENCOUNTER — OFFICE VISIT (OUTPATIENT)
Dept: PODIATRY | Facility: CLINIC | Age: 61
End: 2021-05-19
Payer: MEDICARE

## 2021-05-19 VITALS
DIASTOLIC BLOOD PRESSURE: 63 MMHG | HEIGHT: 66 IN | BODY MASS INDEX: 37.6 KG/M2 | HEART RATE: 72 BPM | SYSTOLIC BLOOD PRESSURE: 140 MMHG | WEIGHT: 233.94 LBS

## 2021-05-19 DIAGNOSIS — I73.9 PVD (PERIPHERAL VASCULAR DISEASE): ICD-10-CM

## 2021-05-19 DIAGNOSIS — E11.49 TYPE II DIABETES MELLITUS WITH NEUROLOGICAL MANIFESTATIONS: Primary | ICD-10-CM

## 2021-05-19 DIAGNOSIS — B35.1 DERMATOPHYTOSIS OF NAIL: ICD-10-CM

## 2021-05-19 PROCEDURE — 99499 NO LOS: ICD-10-PCS | Mod: S$GLB,,, | Performed by: PODIATRIST

## 2021-05-19 PROCEDURE — 1126F AMNT PAIN NOTED NONE PRSNT: CPT | Mod: S$GLB,,, | Performed by: PODIATRIST

## 2021-05-19 PROCEDURE — 11721 DEBRIDE NAIL 6 OR MORE: CPT | Mod: Q8,S$GLB,, | Performed by: PODIATRIST

## 2021-05-19 PROCEDURE — 99999 PR PBB SHADOW E&M-EST. PATIENT-LVL V: ICD-10-PCS | Mod: PBBFAC,,, | Performed by: PODIATRIST

## 2021-05-19 PROCEDURE — 99999 PR PBB SHADOW E&M-EST. PATIENT-LVL V: CPT | Mod: PBBFAC,,, | Performed by: PODIATRIST

## 2021-05-19 PROCEDURE — 3008F BODY MASS INDEX DOCD: CPT | Mod: CPTII,S$GLB,, | Performed by: PODIATRIST

## 2021-05-19 PROCEDURE — 3008F PR BODY MASS INDEX (BMI) DOCUMENTED: ICD-10-PCS | Mod: CPTII,S$GLB,, | Performed by: PODIATRIST

## 2021-05-19 PROCEDURE — 11721 PR DEBRIDEMENT OF NAILS, 6 OR MORE: ICD-10-PCS | Mod: Q8,S$GLB,, | Performed by: PODIATRIST

## 2021-05-19 PROCEDURE — 99499 UNLISTED E&M SERVICE: CPT | Mod: S$GLB,,, | Performed by: PODIATRIST

## 2021-05-19 PROCEDURE — 1126F PR PAIN SEVERITY QUANTIFIED, NO PAIN PRESENT: ICD-10-PCS | Mod: S$GLB,,, | Performed by: PODIATRIST

## 2021-07-28 ENCOUNTER — OFFICE VISIT (OUTPATIENT)
Dept: PODIATRY | Facility: CLINIC | Age: 61
End: 2021-07-28
Payer: MEDICARE

## 2021-07-28 VITALS
WEIGHT: 233 LBS | BODY MASS INDEX: 37.45 KG/M2 | DIASTOLIC BLOOD PRESSURE: 57 MMHG | HEART RATE: 68 BPM | SYSTOLIC BLOOD PRESSURE: 134 MMHG | HEIGHT: 66 IN

## 2021-07-28 DIAGNOSIS — E11.49 TYPE II DIABETES MELLITUS WITH NEUROLOGICAL MANIFESTATIONS: Primary | ICD-10-CM

## 2021-07-28 DIAGNOSIS — I73.9 PVD (PERIPHERAL VASCULAR DISEASE): ICD-10-CM

## 2021-07-28 DIAGNOSIS — B35.1 DERMATOPHYTOSIS OF NAIL: ICD-10-CM

## 2021-07-28 PROCEDURE — 11721 DEBRIDE NAIL 6 OR MORE: CPT | Mod: Q8,S$GLB,, | Performed by: PODIATRIST

## 2021-07-28 PROCEDURE — 3008F BODY MASS INDEX DOCD: CPT | Mod: CPTII,S$GLB,, | Performed by: PODIATRIST

## 2021-07-28 PROCEDURE — 3078F PR MOST RECENT DIASTOLIC BLOOD PRESSURE < 80 MM HG: ICD-10-PCS | Mod: CPTII,S$GLB,, | Performed by: PODIATRIST

## 2021-07-28 PROCEDURE — 99999 PR PBB SHADOW E&M-EST. PATIENT-LVL IV: CPT | Mod: PBBFAC,,, | Performed by: PODIATRIST

## 2021-07-28 PROCEDURE — 1160F RVW MEDS BY RX/DR IN RCRD: CPT | Mod: CPTII,S$GLB,, | Performed by: PODIATRIST

## 2021-07-28 PROCEDURE — 1159F PR MEDICATION LIST DOCUMENTED IN MEDICAL RECORD: ICD-10-PCS | Mod: CPTII,S$GLB,, | Performed by: PODIATRIST

## 2021-07-28 PROCEDURE — 3008F PR BODY MASS INDEX (BMI) DOCUMENTED: ICD-10-PCS | Mod: CPTII,S$GLB,, | Performed by: PODIATRIST

## 2021-07-28 PROCEDURE — 3075F SYST BP GE 130 - 139MM HG: CPT | Mod: CPTII,S$GLB,, | Performed by: PODIATRIST

## 2021-07-28 PROCEDURE — 3075F PR MOST RECENT SYSTOLIC BLOOD PRESS GE 130-139MM HG: ICD-10-PCS | Mod: CPTII,S$GLB,, | Performed by: PODIATRIST

## 2021-07-28 PROCEDURE — 99213 OFFICE O/P EST LOW 20 MIN: CPT | Mod: 25,S$GLB,, | Performed by: PODIATRIST

## 2021-07-28 PROCEDURE — 99213 PR OFFICE/OUTPT VISIT, EST, LEVL III, 20-29 MIN: ICD-10-PCS | Mod: 25,S$GLB,, | Performed by: PODIATRIST

## 2021-07-28 PROCEDURE — 3078F DIAST BP <80 MM HG: CPT | Mod: CPTII,S$GLB,, | Performed by: PODIATRIST

## 2021-07-28 PROCEDURE — 1160F PR REVIEW ALL MEDS BY PRESCRIBER/CLIN PHARMACIST DOCUMENTED: ICD-10-PCS | Mod: CPTII,S$GLB,, | Performed by: PODIATRIST

## 2021-07-28 PROCEDURE — 1159F MED LIST DOCD IN RCRD: CPT | Mod: CPTII,S$GLB,, | Performed by: PODIATRIST

## 2021-07-28 PROCEDURE — 99999 PR PBB SHADOW E&M-EST. PATIENT-LVL IV: ICD-10-PCS | Mod: PBBFAC,,, | Performed by: PODIATRIST

## 2021-07-28 PROCEDURE — 11721 PR DEBRIDEMENT OF NAILS, 6 OR MORE: ICD-10-PCS | Mod: Q8,S$GLB,, | Performed by: PODIATRIST

## 2021-10-04 ENCOUNTER — OFFICE VISIT (OUTPATIENT)
Dept: PODIATRY | Facility: CLINIC | Age: 61
End: 2021-10-04
Payer: MEDICARE

## 2021-10-04 ENCOUNTER — HOSPITAL ENCOUNTER (OUTPATIENT)
Dept: RADIOLOGY | Facility: HOSPITAL | Age: 61
Discharge: HOME | End: 2021-10-04
Attending: PODIATRIST
Payer: MEDICARE

## 2021-10-04 VITALS
HEIGHT: 66 IN | DIASTOLIC BLOOD PRESSURE: 60 MMHG | SYSTOLIC BLOOD PRESSURE: 114 MMHG | HEART RATE: 72 BPM | WEIGHT: 233 LBS | BODY MASS INDEX: 37.45 KG/M2

## 2021-10-04 DIAGNOSIS — R22.41 MASS OF LESSER TOE OF RIGHT FOOT: Primary | ICD-10-CM

## 2021-10-04 DIAGNOSIS — B35.1 DERMATOPHYTOSIS OF NAIL: ICD-10-CM

## 2021-10-04 DIAGNOSIS — E11.49 TYPE II DIABETES MELLITUS WITH NEUROLOGICAL MANIFESTATIONS: ICD-10-CM

## 2021-10-04 DIAGNOSIS — I73.9 PVD (PERIPHERAL VASCULAR DISEASE): ICD-10-CM

## 2021-10-04 DIAGNOSIS — R22.41 MASS OF LESSER TOE OF RIGHT FOOT: ICD-10-CM

## 2021-10-04 PROCEDURE — 3074F PR MOST RECENT SYSTOLIC BLOOD PRESSURE < 130 MM HG: ICD-10-PCS | Mod: CPTII,S$GLB,, | Performed by: PODIATRIST

## 2021-10-04 PROCEDURE — 1159F PR MEDICATION LIST DOCUMENTED IN MEDICAL RECORD: ICD-10-PCS | Mod: CPTII,S$GLB,, | Performed by: PODIATRIST

## 2021-10-04 PROCEDURE — 11721 DEBRIDE NAIL 6 OR MORE: CPT | Mod: Q8,S$GLB,, | Performed by: PODIATRIST

## 2021-10-04 PROCEDURE — 99213 PR OFFICE/OUTPT VISIT, EST, LEVL III, 20-29 MIN: ICD-10-PCS | Mod: 25,S$GLB,, | Performed by: PODIATRIST

## 2021-10-04 PROCEDURE — 3074F SYST BP LT 130 MM HG: CPT | Mod: CPTII,S$GLB,, | Performed by: PODIATRIST

## 2021-10-04 PROCEDURE — 1160F RVW MEDS BY RX/DR IN RCRD: CPT | Mod: CPTII,S$GLB,, | Performed by: PODIATRIST

## 2021-10-04 PROCEDURE — 3008F PR BODY MASS INDEX (BMI) DOCUMENTED: ICD-10-PCS | Mod: CPTII,S$GLB,, | Performed by: PODIATRIST

## 2021-10-04 PROCEDURE — 73660 X-RAY EXAM OF TOE(S): CPT | Mod: TC,RT

## 2021-10-04 PROCEDURE — 73660 XR TOE 2 OR MORE VIEWS RIGHT: ICD-10-PCS | Mod: 26,RT,, | Performed by: RADIOLOGY

## 2021-10-04 PROCEDURE — 1160F PR REVIEW ALL MEDS BY PRESCRIBER/CLIN PHARMACIST DOCUMENTED: ICD-10-PCS | Mod: CPTII,S$GLB,, | Performed by: PODIATRIST

## 2021-10-04 PROCEDURE — 3078F PR MOST RECENT DIASTOLIC BLOOD PRESSURE < 80 MM HG: ICD-10-PCS | Mod: CPTII,S$GLB,, | Performed by: PODIATRIST

## 2021-10-04 PROCEDURE — 99999 PR PBB SHADOW E&M-EST. PATIENT-LVL V: CPT | Mod: PBBFAC,,, | Performed by: PODIATRIST

## 2021-10-04 PROCEDURE — 11721 PR DEBRIDEMENT OF NAILS, 6 OR MORE: ICD-10-PCS | Mod: Q8,S$GLB,, | Performed by: PODIATRIST

## 2021-10-04 PROCEDURE — 99213 OFFICE O/P EST LOW 20 MIN: CPT | Mod: 25,S$GLB,, | Performed by: PODIATRIST

## 2021-10-04 PROCEDURE — 99999 PR PBB SHADOW E&M-EST. PATIENT-LVL V: ICD-10-PCS | Mod: PBBFAC,,, | Performed by: PODIATRIST

## 2021-10-04 PROCEDURE — 73660 X-RAY EXAM OF TOE(S): CPT | Mod: 26,RT,, | Performed by: RADIOLOGY

## 2021-10-04 PROCEDURE — 3078F DIAST BP <80 MM HG: CPT | Mod: CPTII,S$GLB,, | Performed by: PODIATRIST

## 2021-10-04 PROCEDURE — 1159F MED LIST DOCD IN RCRD: CPT | Mod: CPTII,S$GLB,, | Performed by: PODIATRIST

## 2021-10-04 PROCEDURE — 3008F BODY MASS INDEX DOCD: CPT | Mod: CPTII,S$GLB,, | Performed by: PODIATRIST

## 2021-12-29 ENCOUNTER — OFFICE VISIT (OUTPATIENT)
Dept: PODIATRY | Facility: CLINIC | Age: 61
End: 2021-12-29
Payer: MEDICARE

## 2021-12-29 VITALS
HEART RATE: 65 BPM | WEIGHT: 233 LBS | SYSTOLIC BLOOD PRESSURE: 134 MMHG | DIASTOLIC BLOOD PRESSURE: 60 MMHG | BODY MASS INDEX: 37.45 KG/M2 | HEIGHT: 66 IN

## 2021-12-29 DIAGNOSIS — E11.49 TYPE II DIABETES MELLITUS WITH NEUROLOGICAL MANIFESTATIONS: Primary | ICD-10-CM

## 2021-12-29 DIAGNOSIS — I73.9 PVD (PERIPHERAL VASCULAR DISEASE): ICD-10-CM

## 2021-12-29 DIAGNOSIS — B35.1 DERMATOPHYTOSIS OF NAIL: ICD-10-CM

## 2021-12-29 PROCEDURE — 1160F PR REVIEW ALL MEDS BY PRESCRIBER/CLIN PHARMACIST DOCUMENTED: ICD-10-PCS | Mod: CPTII,S$GLB,, | Performed by: PODIATRIST

## 2021-12-29 PROCEDURE — 3078F PR MOST RECENT DIASTOLIC BLOOD PRESSURE < 80 MM HG: ICD-10-PCS | Mod: CPTII,S$GLB,, | Performed by: PODIATRIST

## 2021-12-29 PROCEDURE — 3008F PR BODY MASS INDEX (BMI) DOCUMENTED: ICD-10-PCS | Mod: CPTII,S$GLB,, | Performed by: PODIATRIST

## 2021-12-29 PROCEDURE — 99499 UNLISTED E&M SERVICE: CPT | Mod: S$GLB,,, | Performed by: PODIATRIST

## 2021-12-29 PROCEDURE — 1159F PR MEDICATION LIST DOCUMENTED IN MEDICAL RECORD: ICD-10-PCS | Mod: CPTII,S$GLB,, | Performed by: PODIATRIST

## 2021-12-29 PROCEDURE — 99999 PR PBB SHADOW E&M-EST. PATIENT-LVL IV: CPT | Mod: PBBFAC,,, | Performed by: PODIATRIST

## 2021-12-29 PROCEDURE — 3008F BODY MASS INDEX DOCD: CPT | Mod: CPTII,S$GLB,, | Performed by: PODIATRIST

## 2021-12-29 PROCEDURE — 3075F PR MOST RECENT SYSTOLIC BLOOD PRESS GE 130-139MM HG: ICD-10-PCS | Mod: CPTII,S$GLB,, | Performed by: PODIATRIST

## 2021-12-29 PROCEDURE — 99499 NO LOS: ICD-10-PCS | Mod: S$GLB,,, | Performed by: PODIATRIST

## 2021-12-29 PROCEDURE — 1159F MED LIST DOCD IN RCRD: CPT | Mod: CPTII,S$GLB,, | Performed by: PODIATRIST

## 2021-12-29 PROCEDURE — 1160F RVW MEDS BY RX/DR IN RCRD: CPT | Mod: CPTII,S$GLB,, | Performed by: PODIATRIST

## 2021-12-29 PROCEDURE — 99999 PR PBB SHADOW E&M-EST. PATIENT-LVL IV: ICD-10-PCS | Mod: PBBFAC,,, | Performed by: PODIATRIST

## 2021-12-29 PROCEDURE — 3078F DIAST BP <80 MM HG: CPT | Mod: CPTII,S$GLB,, | Performed by: PODIATRIST

## 2021-12-29 PROCEDURE — 11721 DEBRIDE NAIL 6 OR MORE: CPT | Mod: Q8,S$GLB,, | Performed by: PODIATRIST

## 2021-12-29 PROCEDURE — 11721 PR DEBRIDEMENT OF NAILS, 6 OR MORE: ICD-10-PCS | Mod: Q8,S$GLB,, | Performed by: PODIATRIST

## 2021-12-29 PROCEDURE — 3075F SYST BP GE 130 - 139MM HG: CPT | Mod: CPTII,S$GLB,, | Performed by: PODIATRIST

## 2021-12-29 RX ORDER — BUMETANIDE 2 MG/1
2 TABLET ORAL
COMMUNITY
Start: 2021-11-17 | End: 2022-11-17

## 2021-12-29 RX ORDER — GABAPENTIN 300 MG/1
300 CAPSULE ORAL 2 TIMES DAILY
Qty: 60 CAPSULE | Refills: 5 | Status: SHIPPED | OUTPATIENT
Start: 2021-12-29 | End: 2023-03-09 | Stop reason: ALTCHOICE

## 2021-12-29 NOTE — PROGRESS NOTES
Subjective:     Patient ID: Julia Preston is a 61 y.o. female.    Chief Complaint: Nail Care (2mo nail care, diabetic pt wears tennis shoes w/o socks , PCP Dr. Garsia last seen 12-10-21)    Julia is a 61 y.o. female who presents to the clinic for evaluation and treatment of high risk feet. Julia has a past medical history of CHF (congestive heart failure), Congestive heart failure, Congestive heart failure (2018), Diabetes mellitus, type 2, GERD (gastroesophageal reflux disease), High cholesterol, Hyperlipidemia, Hypertension, Pneumonia (6/2017 or 7/2017 per the patient), and Pneumonia (03/2018). The patient's chief complaint is foot check and nail care. This patient has documented high risk feet requiring routine maintenance secondary to diabetes mellitis and those secondary complications of diabetes, as mentioned.      PCP: Mana Garsia MD    Date Last Seen by PCP: 12/10/2021    Current shoe gear:  Affected Foot: Diabetic shoes     Unaffected Foot: Diabetic shoes      Patient Active Problem List   Diagnosis    GERD (gastroesophageal reflux disease)    Type II diabetes mellitus with peripheral circulatory disorder    Hyperlipidemia    Hypertension    PVD (peripheral vascular disease)    Severe obesity (BMI 35.0-39.9) with comorbidity       Medication List with Changes/Refills   Current Medications    ACCU-CHEK SIOMARA PLUS TEST STRP STRP        ACCU-CHEK SOFTCLIX LANCETS MISC        ACETAMINOPHEN (TYLENOL) 500 MG TABLET    Take 1-2 tablets by mouth.    ASPIRIN (ECOTRIN) 81 MG EC TABLET    Take 81 mg by mouth once daily.    ATORVASTATIN (LIPITOR) 80 MG TABLET    Take 80 mg by mouth once daily.    BD ALCOHOL SWABS PADM        BUMETANIDE (BUMEX) 2 MG TABLET    Take 2 mg by mouth.    CARVEDILOL (COREG) 12.5 MG TABLET        CLONIDINE (CATAPRES) 0.1 MG TABLET    Take 1 tablet by mouth.    COLCHICINE (COLCRYS) 0.6 MG TABLET    Take 1 tablet (0.6 mg total) by mouth once daily. Take one tablet daily.  for 6 days    CYANOCOBALAMIN 500 MCG TABLET    Take 500 mcg by mouth.    ERGOCALCIFEROL (ERGOCALCIFEROL) 50,000 UNIT CAP    Take 1 capsule by mouth.    FLUTICASONE PROPIONATE (FLONASE) 50 MCG/ACTUATION NASAL SPRAY    1 spray by Nasal route.    FOLIC ACID (FOLVITE) 1 MG TABLET    Take 1 tablet by mouth.    FUROSEMIDE (LASIX) 20 MG TABLET        HYDRALAZINE (APRESOLINE) 100 MG TABLET        INSULIN ASPART PROTAMINE-INSULIN ASPART (NOVOLOG 70/30) 100 UNIT/ML (70-30) INPN PEN    Inject 40 Units into the skin every morning.    INSULIN ASPART U-100 (NOVOLOG) 100 UNIT/ML INJECTION    Inject 40 Units into the skin.    LINACLOTIDE (LINZESS) 145 MCG CAP CAPSULE    Take 1 capsule by mouth.    NIFEDIPINE (PROCARDIA-XL) 60 MG (OSM) 24 HR TABLET    Take 60 mg by mouth once daily.    NITROGLYCERIN (NITROSTAT) 0.4 MG SL TABLET    Place 0.4 mg under the tongue every 5 (five) minutes as needed for Chest pain.    NOVOLIN 70/30 U-100 INSULIN 100 UNIT/ML (70-30) INJECTION        NOVOLOG MIX 70-30 U-100 INSULN 100 UNIT/ML (70-30) SOLN        PANTOPRAZOLE (PROTONIX) 40 MG TABLET    Take 40 mg by mouth.    PAZEO 0.7 % DROP        XARELTO 2.5 MG TAB           Review of patient's allergies indicates:   Allergen Reactions    Lortab [hydrocodone-acetaminophen] Itching    Morphine Other (See Comments)     Hallucinations  Hallucinations         Past Surgical History:   Procedure Laterality Date    BREAST BIOPSY      benign, pt states she was about 27yrs old    CARDIAC SURGERY  2013    CATARACT EXTRACTION Right 05/10/2021     SECTION      HYSTERECTOMY      OOPHORECTOMY         Family History   Problem Relation Age of Onset    Hypertension Mother     Diabetes Mother     Hypertension Brother     Diabetes Brother     Seizures Son        Social History     Socioeconomic History    Marital status:    Tobacco Use    Smoking status: Never Smoker    Smokeless tobacco: Never Used   Substance and Sexual Activity    Alcohol  "use: No    Drug use: No    Sexual activity: Yes     Partners: Male       Vitals:    12/29/21 1551   Weight: 105.7 kg (233 lb 0.4 oz)   Height: 5' 6" (1.676 m)       Review of Systems   Constitutional: Negative for chills and fever.   Respiratory: Negative for shortness of breath.    Cardiovascular: Negative for chest pain, palpitations, orthopnea, claudication and leg swelling.   Gastrointestinal: Negative for diarrhea, nausea and vomiting.   Musculoskeletal: Negative for joint pain.   Skin: Negative for rash.   Neurological: Positive for tingling and sensory change.   Psychiatric/Behavioral: Negative.              Objective:   PHYSICAL EXAM: Apperance: Alert and orient in no distress,well developed, and with good attention to grooming and body habits  Patient presents ambulating in tennis shoes.   LOWER EXTREMITY EXAM:  VASCULAR: Dorsalis pedis pulses 0/4(monphasic with doppler) bilateral and Posterior Tibial pulses 1/4 bilateral. Capillary fill time <4 seconds bilateral. No edema observed bilateral. Varicosities present bilateral. Skin temperature of the lower extremities is warm to warm, proximal to distal. Hair growth absent bilateral.   DERMATOLOGICAL: No skin rashes, lesions, or ulcers observed bilateral. Nails 1,2,3,4,5 bilateral elongated, thickened, and discolored with subungual debris. Webspaces 1,2,3,4 clean, dry and without evidence of break in skin integrity bilateral. Minimal hyperkeratotic tissue noted to left plantar hallux. Palpable fixated subcutaneous nodule noted to right medial 2nd toe.  NEUROLOGICAL: Light touch, sharp-dull, proprioception all present and equal bilaterally.  Vibratory sensation diminished at bilateral hallux. Protective sensation absent at 4/10 sites as tested with a [place]-Brandy 5.07 monofilament.   MUSCULOSKELETAL: Muscle strength is 5/5 for foot inverters, everters, plantarflexors, and dorsiflexors. Muscle tone is normal. (-) pain on palpation of right 2nd toe. "     TEST RESULTS: Radiographs of right foot/ankle taken reveals there has been interval development of significant lateral subluxation of the middle phalanx in relation to the proximal phalanx of the 2nd digit.  There is also medial subluxation of the distal phalanx in relation to the middle phalanx of the 2nd digit.  There is some soft tissue swelling surrounding the 2nd digit.  There is chronic appearing erosive change with cortication seen along the distal phalanx of the 3rd digit.  A hallux valgus deformity is noted.        Assessment:   Type II diabetes mellitus with neurological manifestations    PVD (peripheral vascular disease)    Dermatophytosis of nail          Plan:   Type II diabetes mellitus with neurological manifestations    PVD (peripheral vascular disease)    Dermatophytosis of nail      I counseled the patient on her conditions, regarding findings of my examination, my impressions, and usual treatment plan.   Greater than 15 minutes of a 20 minute appointment spent on education about the diabetic foot, neuropathy, and prevention of limb loss.  Shoe inspection. Diabetic Foot Education. Patient reminded of the importance of good nutrition and blood sugar control to help prevent podiatric complications of diabetes. Patient instructed on proper foot hygeine. We discussed wearing proper shoe gear, daily foot inspections, never walking without protective shoe gear, never putting sharp instruments to feet.    With patient's permission, nails 1-5 bilateral were debrided/trimmed in length and thickness aggressively to their soft tissue attachment mechanically and with electric , removing all offending nail and debris. Patient relates relief following the procedure.  Reviewed right foot x-rays in exam room with patient.   Patient  will continue to monitor the areas daily, inspect feet, wear protective shoe gear when ambulatory, moisturizer to maintain skin integrity. Patient reminded of the importance  of good nutrition and blood sugar control to help prevent podiatric complications of diabetes.  Patient to return 2 months or sooner if needed.                 Viet Muir DPM  [facility]

## 2022-02-28 ENCOUNTER — OFFICE VISIT (OUTPATIENT)
Dept: PODIATRY | Facility: CLINIC | Age: 62
End: 2022-02-28
Payer: MEDICARE

## 2022-02-28 ENCOUNTER — LAB VISIT (OUTPATIENT)
Dept: LAB | Facility: HOSPITAL | Age: 62
End: 2022-02-28
Attending: PODIATRIST
Payer: MEDICARE

## 2022-02-28 VITALS
SYSTOLIC BLOOD PRESSURE: 124 MMHG | BODY MASS INDEX: 37.45 KG/M2 | HEIGHT: 66 IN | DIASTOLIC BLOOD PRESSURE: 54 MMHG | HEART RATE: 63 BPM | WEIGHT: 233 LBS

## 2022-02-28 DIAGNOSIS — E11.49 TYPE II DIABETES MELLITUS WITH NEUROLOGICAL MANIFESTATIONS: ICD-10-CM

## 2022-02-28 DIAGNOSIS — I73.9 PVD (PERIPHERAL VASCULAR DISEASE): ICD-10-CM

## 2022-02-28 DIAGNOSIS — B35.1 DERMATOPHYTOSIS OF NAIL: ICD-10-CM

## 2022-02-28 DIAGNOSIS — M10.9 ACUTE GOUT OF RIGHT FOOT, UNSPECIFIED CAUSE: ICD-10-CM

## 2022-02-28 DIAGNOSIS — M10.9 ACUTE GOUT OF RIGHT FOOT, UNSPECIFIED CAUSE: Primary | ICD-10-CM

## 2022-02-28 LAB
BASOPHILS # BLD AUTO: 0.04 K/UL (ref 0–0.2)
BASOPHILS NFR BLD: 0.4 % (ref 0–1.9)
CRP SERPL-MCNC: 5.9 MG/L (ref 0–8.2)
DIFFERENTIAL METHOD: ABNORMAL
EOSINOPHIL # BLD AUTO: 0.2 K/UL (ref 0–0.5)
EOSINOPHIL NFR BLD: 1.9 % (ref 0–8)
ERYTHROCYTE [DISTWIDTH] IN BLOOD BY AUTOMATED COUNT: 15.2 % (ref 11.5–14.5)
ERYTHROCYTE [SEDIMENTATION RATE] IN BLOOD BY WESTERGREN METHOD: 79 MM/HR (ref 0–36)
HCT VFR BLD AUTO: 34.3 % (ref 37–48.5)
HGB BLD-MCNC: 10 G/DL (ref 12–16)
IMM GRANULOCYTES # BLD AUTO: 0.02 K/UL (ref 0–0.04)
IMM GRANULOCYTES NFR BLD AUTO: 0.2 % (ref 0–0.5)
LYMPHOCYTES # BLD AUTO: 2.5 K/UL (ref 1–4.8)
LYMPHOCYTES NFR BLD: 26.2 % (ref 18–48)
MCH RBC QN AUTO: 30.8 PG (ref 27–31)
MCHC RBC AUTO-ENTMCNC: 29.2 G/DL (ref 32–36)
MCV RBC AUTO: 106 FL (ref 82–98)
MONOCYTES # BLD AUTO: 0.7 K/UL (ref 0.3–1)
MONOCYTES NFR BLD: 7.6 % (ref 4–15)
NEUTROPHILS # BLD AUTO: 6.1 K/UL (ref 1.8–7.7)
NEUTROPHILS NFR BLD: 63.7 % (ref 38–73)
NRBC BLD-RTO: 0 /100 WBC
PLATELET # BLD AUTO: 290 K/UL (ref 150–450)
PMV BLD AUTO: 9.7 FL (ref 9.2–12.9)
RBC # BLD AUTO: 3.25 M/UL (ref 4–5.4)
URATE SERPL-MCNC: 11.2 MG/DL (ref 2.4–5.7)
WBC # BLD AUTO: 9.57 K/UL (ref 3.9–12.7)

## 2022-02-28 PROCEDURE — 85652 RBC SED RATE AUTOMATED: CPT | Performed by: PODIATRIST

## 2022-02-28 PROCEDURE — 3008F PR BODY MASS INDEX (BMI) DOCUMENTED: ICD-10-PCS | Mod: CPTII,S$GLB,, | Performed by: PODIATRIST

## 2022-02-28 PROCEDURE — 99213 OFFICE O/P EST LOW 20 MIN: CPT | Mod: 25,S$GLB,, | Performed by: PODIATRIST

## 2022-02-28 PROCEDURE — 3078F PR MOST RECENT DIASTOLIC BLOOD PRESSURE < 80 MM HG: ICD-10-PCS | Mod: CPTII,S$GLB,, | Performed by: PODIATRIST

## 2022-02-28 PROCEDURE — 4010F PR ACE/ARB THEARPY RXD/TAKEN: ICD-10-PCS | Mod: CPTII,S$GLB,, | Performed by: PODIATRIST

## 2022-02-28 PROCEDURE — 3008F BODY MASS INDEX DOCD: CPT | Mod: CPTII,S$GLB,, | Performed by: PODIATRIST

## 2022-02-28 PROCEDURE — 1159F PR MEDICATION LIST DOCUMENTED IN MEDICAL RECORD: ICD-10-PCS | Mod: CPTII,S$GLB,, | Performed by: PODIATRIST

## 2022-02-28 PROCEDURE — 3051F HG A1C>EQUAL 7.0%<8.0%: CPT | Mod: CPTII,S$GLB,, | Performed by: PODIATRIST

## 2022-02-28 PROCEDURE — 36415 COLL VENOUS BLD VENIPUNCTURE: CPT | Performed by: PODIATRIST

## 2022-02-28 PROCEDURE — 3078F DIAST BP <80 MM HG: CPT | Mod: CPTII,S$GLB,, | Performed by: PODIATRIST

## 2022-02-28 PROCEDURE — 3051F PR MOST RECENT HEMOGLOBIN A1C LEVEL 7.0 - < 8.0%: ICD-10-PCS | Mod: CPTII,S$GLB,, | Performed by: PODIATRIST

## 2022-02-28 PROCEDURE — 11721 DEBRIDE NAIL 6 OR MORE: CPT | Mod: Q8,S$GLB,, | Performed by: PODIATRIST

## 2022-02-28 PROCEDURE — 84550 ASSAY OF BLOOD/URIC ACID: CPT | Performed by: PODIATRIST

## 2022-02-28 PROCEDURE — 85025 COMPLETE CBC W/AUTO DIFF WBC: CPT | Performed by: PODIATRIST

## 2022-02-28 PROCEDURE — 1159F MED LIST DOCD IN RCRD: CPT | Mod: CPTII,S$GLB,, | Performed by: PODIATRIST

## 2022-02-28 PROCEDURE — 3074F PR MOST RECENT SYSTOLIC BLOOD PRESSURE < 130 MM HG: ICD-10-PCS | Mod: CPTII,S$GLB,, | Performed by: PODIATRIST

## 2022-02-28 PROCEDURE — 4010F ACE/ARB THERAPY RXD/TAKEN: CPT | Mod: CPTII,S$GLB,, | Performed by: PODIATRIST

## 2022-02-28 PROCEDURE — 1160F PR REVIEW ALL MEDS BY PRESCRIBER/CLIN PHARMACIST DOCUMENTED: ICD-10-PCS | Mod: CPTII,S$GLB,, | Performed by: PODIATRIST

## 2022-02-28 PROCEDURE — 99999 PR PBB SHADOW E&M-EST. PATIENT-LVL V: ICD-10-PCS | Mod: PBBFAC,,, | Performed by: PODIATRIST

## 2022-02-28 PROCEDURE — 99999 PR PBB SHADOW E&M-EST. PATIENT-LVL V: CPT | Mod: PBBFAC,,, | Performed by: PODIATRIST

## 2022-02-28 PROCEDURE — 86140 C-REACTIVE PROTEIN: CPT | Performed by: PODIATRIST

## 2022-02-28 PROCEDURE — 3074F SYST BP LT 130 MM HG: CPT | Mod: CPTII,S$GLB,, | Performed by: PODIATRIST

## 2022-02-28 PROCEDURE — 1160F RVW MEDS BY RX/DR IN RCRD: CPT | Mod: CPTII,S$GLB,, | Performed by: PODIATRIST

## 2022-02-28 PROCEDURE — 11721 PR DEBRIDEMENT OF NAILS, 6 OR MORE: ICD-10-PCS | Mod: Q8,S$GLB,, | Performed by: PODIATRIST

## 2022-02-28 PROCEDURE — 99213 PR OFFICE/OUTPT VISIT, EST, LEVL III, 20-29 MIN: ICD-10-PCS | Mod: 25,S$GLB,, | Performed by: PODIATRIST

## 2022-02-28 NOTE — PROGRESS NOTES
Subjective:     Patient ID: Julia Preston is a 61 y.o. female.    Chief Complaint: Nail Care (C/o bilateral throbbing foot pain, rates pain 8/10, states pain occurs during the night, Wears tennis shoes with socks, diabetic Pt, PCP Dr. Mana Garsia.)    Julia is a 61 y.o. female who presents to the clinic for evaluation and treatment of high risk feet. Julia has a past medical history of CHF (congestive heart failure), Congestive heart failure, Congestive heart failure (2018), Diabetes mellitus, type 2, GERD (gastroesophageal reflux disease), High cholesterol, Hyperlipidemia, Hypertension, Pneumonia (6/2017 or 7/2017 per the patient), and Pneumonia (03/2018). The patient's chief complaint is foot check and nail care. This patient has documented high risk feet requiring routine maintenance secondary to diabetes mellitis and those secondary complications of diabetes, as mentioned.  Patient states right foot has been swollen and painful for about 1 week. Patent points to right lateral foot for pain. Patient states pain is worse at nighttime.     PCP: Mana Garsia MD    Date Last Seen by PCP: 12/10/2021    Current shoe gear:  Affected Foot: Diabetic shoes     Unaffected Foot: Diabetic shoes      Patient Active Problem List   Diagnosis    GERD (gastroesophageal reflux disease)    Type II diabetes mellitus with peripheral circulatory disorder    Hyperlipidemia    Hypertension    PVD (peripheral vascular disease)    Severe obesity (BMI 35.0-39.9) with comorbidity       Medication List with Changes/Refills   Current Medications    ACCU-CHEK [place] PLUS TEST STRP STRP        ACCU-CHEK SOFTCLIX LANCETS MISC        ACETAMINOPHEN (TYLENOL) 500 MG TABLET    Take 1-2 tablets by mouth.    ASPIRIN (ECOTRIN) 81 MG EC TABLET    Take 81 mg by mouth once daily.    ATORVASTATIN (LIPITOR) 80 MG TABLET    Take 80 mg by mouth once daily.    BD ALCOHOL SWABS PADM        BUMETANIDE (BUMEX) 2 MG TABLET    Take 2 mg by  mouth.    CARVEDILOL (COREG) 12.5 MG TABLET        CLONIDINE (CATAPRES) 0.1 MG TABLET    Take 1 tablet by mouth.    COLCHICINE (COLCRYS) 0.6 MG TABLET    Take 1 tablet (0.6 mg total) by mouth once daily. Take one tablet daily. for 6 days    CYANOCOBALAMIN 500 MCG TABLET    Take 500 mcg by mouth.    ERGOCALCIFEROL (ERGOCALCIFEROL) 50,000 UNIT CAP    Take 1 capsule by mouth.    FLUTICASONE PROPIONATE (FLONASE) 50 MCG/ACTUATION NASAL SPRAY    1 spray by Nasal route.    FOLIC ACID (FOLVITE) 1 MG TABLET    Take 1 tablet by mouth.    FUROSEMIDE (LASIX) 20 MG TABLET        GABAPENTIN (NEURONTIN) 300 MG CAPSULE    Take 1 capsule (300 mg total) by mouth 2 (two) times daily.    HYDRALAZINE (APRESOLINE) 100 MG TABLET        INSULIN ASPART PROTAMINE-INSULIN ASPART (NOVOLOG 70/30) 100 UNIT/ML (70-30) INPN PEN    Inject 40 Units into the skin every morning.    INSULIN ASPART U-100 (NOVOLOG) 100 UNIT/ML INJECTION    Inject 40 Units into the skin.    LINACLOTIDE (LINZESS) 145 MCG CAP CAPSULE    Take 1 capsule by mouth.    NIFEDIPINE (PROCARDIA-XL) 60 MG (OSM) 24 HR TABLET    Take 60 mg by mouth once daily.    NITROGLYCERIN (NITROSTAT) 0.4 MG SL TABLET    Place 0.4 mg under the tongue every 5 (five) minutes as needed for Chest pain.    NOVOLIN 70/30 U-100 INSULIN 100 UNIT/ML (70-30) INJECTION        NOVOLOG MIX 70-30 U-100 INSULN 100 UNIT/ML (70-30) SOLN        PANTOPRAZOLE (PROTONIX) 40 MG TABLET    Take 40 mg by mouth.    PAZEO 0.7 % DROP        XARELTO 2.5 MG TAB           Review of patient's allergies indicates:   Allergen Reactions    Lortab [hydrocodone-acetaminophen] Itching    Morphine Other (See Comments)     Hallucinations  Hallucinations         Past Surgical History:   Procedure Laterality Date    BREAST BIOPSY      benign, pt states she was about 27yrs old    CARDIAC SURGERY  2013    CATARACT EXTRACTION Right 05/10/2021     SECTION      HYSTERECTOMY      OOPHORECTOMY         Family History   Problem  "Relation Age of Onset    Hypertension Mother     Diabetes Mother     Hypertension Brother     Diabetes Brother     Seizures Son        Social History     Socioeconomic History    Marital status:    Tobacco Use    Smoking status: Never Smoker    Smokeless tobacco: Never Used   Substance and Sexual Activity    Alcohol use: No    Drug use: No    Sexual activity: Yes     Partners: Male       Vitals:    02/28/22 1051   BP: (!) 124/54   Pulse: 63   Weight: 105.7 kg (233 lb)   Height: 5' 6" (1.676 m)   PainSc:   8   PainLoc: Foot       Review of Systems   Constitutional: Negative for chills and fever.   Respiratory: Negative for shortness of breath.    Cardiovascular: Negative for chest pain, palpitations, orthopnea, claudication and leg swelling.   Gastrointestinal: Negative for diarrhea, nausea and vomiting.   Musculoskeletal: Negative for joint pain.   Skin: Negative for rash.   Neurological: Positive for tingling and sensory change.   Psychiatric/Behavioral: Negative.              Objective:   PHYSICAL EXAM: Apperance: Alert and orient in no distress,well developed, and with good attention to grooming and body habits  Patient presents ambulating in tennis shoes.   LOWER EXTREMITY EXAM:  VASCULAR: Dorsalis pedis pulses 0/4(monphasic with doppler) bilateral and Posterior Tibial pulses 1/4 bilateral. Capillary fill time <4 seconds bilateral. Mild edema observed right foot. Varicosities present bilateral. Skin temperature of the lower extremities is warm to warm, proximal to distal. Hair growth absent bilateral.   DERMATOLOGICAL: No skin rashes, lesions, or ulcers observed bilateral. Nails 1,2,3,4,5 bilateral elongated, thickened, and discolored with subungual debris. Webspaces 1,2,3,4 clean, dry and without evidence of break in skin integrity bilateral. Minimal hyperkeratotic tissue noted to left plantar hallux. Palpable fixated subcutaneous nodule noted to right medial 2nd toe.(+) edema, (--) erythema, " (+) increased temperature noted to right foot.   NEUROLOGICAL: Light touch, sharp-dull, proprioception all present and equal bilaterally.  Vibratory sensation diminished at bilateral hallux. Protective sensation absent at 4/10 sites as tested with a [place]-Brandy 5.07 monofilament.   MUSCULOSKELETAL: Muscle strength is 5/5 for foot inverters, everters, plantarflexors, and dorsiflexors. Muscle tone is normal. (-) pain on palpation of right 2nd toe. (+) pain on palpation of right lateral foot at the sinus tarsi.         Assessment:   Acute gout of right foot, unspecified cause  -     Sedimentation rate; Future; Expected date: 02/28/2022  -     C-Reactive Protein; Future; Expected date: 02/28/2022  -     Uric Acid; Future; Expected date: 02/28/2022  -     CBC auto differential; Future; Expected date: 02/28/2022    Type II diabetes mellitus with neurological manifestations    PVD (peripheral vascular disease)    Dermatophytosis of nail          Plan:   Acute gout of right foot, unspecified cause  -     Sedimentation rate; Future; Expected date: 02/28/2022  -     C-Reactive Protein; Future; Expected date: 02/28/2022  -     Uric Acid; Future; Expected date: 02/28/2022  -     CBC auto differential; Future; Expected date: 02/28/2022    Type II diabetes mellitus with neurological manifestations    PVD (peripheral vascular disease)    Dermatophytosis of nail      I counseled the patient on her conditions, regarding findings of my examination, my impressions, and usual treatment plan.   Greater than 15 minutes of a 20 minute appointment spent on education about the diabetic foot, neuropathy, and prevention of limb loss.  Shoe inspection. Diabetic Foot Education. Patient reminded of the importance of good nutrition and blood sugar control to help prevent podiatric complications of diabetes. Patient instructed on proper foot hygeine. We discussed wearing proper shoe gear, daily foot inspections, never walking without  protective shoe gear, never putting sharp instruments to feet.    With patient's permission, nails 1-5 bilateral were debrided/trimmed in length and thickness aggressively to their soft tissue attachment mechanically and with electric , removing all offending nail and debris. Patient relates relief following the procedure.  I explained to the patient that etiologies and treatment options for gout including rest, elevation, diet control, NSAID's, long term gout medication, and/or injection therapy.    Ordered uric acid, crp, and esr testing to be completed today. Patient to be contacted with results.   Patient  will continue to monitor the areas daily, inspect feet, wear protective shoe gear when ambulatory, moisturizer to maintain skin integrity. Patient reminded of the importance of good nutrition and blood sugar control to help prevent podiatric complications of diabetes.  Patient to return 2 months or sooner if needed.                 Viet Muir DPM  [facility]

## 2022-05-17 ENCOUNTER — TELEPHONE (OUTPATIENT)
Dept: OBSTETRICS AND GYNECOLOGY | Facility: CLINIC | Age: 62
End: 2022-05-17
Payer: MEDICARE

## 2022-05-17 DIAGNOSIS — Z12.31 ENCOUNTER FOR SCREENING MAMMOGRAM FOR MALIGNANT NEOPLASM OF BREAST: Primary | ICD-10-CM

## 2022-05-17 NOTE — TELEPHONE ENCOUNTER
Left message for patient to return call to [phone].    mammo order done.  Patient due for annual.

## 2022-05-17 NOTE — TELEPHONE ENCOUNTER
----- Message from Fela Dugan sent at 5/17/2022 11:07 AM CDT -----  .Type:  Mammogram    Caller is requesting to schedule their annual mammogram appointment.  Order is not listed in EPIC.  Please enter order and contact patient to schedule.  Name of Caller:.Julia Preston    Where would they like the mammogram performed?[facility]  Would the patient rather a call back or a response via [facility]? Call back  Best Call Back Number:.[phone]    Additional Information:

## 2022-06-06 ENCOUNTER — OFFICE VISIT (OUTPATIENT)
Dept: PODIATRY | Facility: CLINIC | Age: 62
End: 2022-06-06
Payer: MEDICARE

## 2022-06-06 VITALS — WEIGHT: 233 LBS | BODY MASS INDEX: 37.45 KG/M2 | HEIGHT: 66 IN

## 2022-06-06 DIAGNOSIS — I73.9 PVD (PERIPHERAL VASCULAR DISEASE): ICD-10-CM

## 2022-06-06 DIAGNOSIS — B35.1 DERMATOPHYTOSIS OF NAIL: ICD-10-CM

## 2022-06-06 DIAGNOSIS — E11.49 TYPE II DIABETES MELLITUS WITH NEUROLOGICAL MANIFESTATIONS: Primary | ICD-10-CM

## 2022-06-06 PROCEDURE — 4010F PR ACE/ARB THEARPY RXD/TAKEN: ICD-10-PCS | Mod: CPTII,S$GLB,, | Performed by: PODIATRIST

## 2022-06-06 PROCEDURE — 11721 PR DEBRIDEMENT OF NAILS, 6 OR MORE: ICD-10-PCS | Mod: Q8,S$GLB,, | Performed by: PODIATRIST

## 2022-06-06 PROCEDURE — 3008F BODY MASS INDEX DOCD: CPT | Mod: CPTII,S$GLB,, | Performed by: PODIATRIST

## 2022-06-06 PROCEDURE — 1159F PR MEDICATION LIST DOCUMENTED IN MEDICAL RECORD: ICD-10-PCS | Mod: CPTII,S$GLB,, | Performed by: PODIATRIST

## 2022-06-06 PROCEDURE — 4010F ACE/ARB THERAPY RXD/TAKEN: CPT | Mod: CPTII,S$GLB,, | Performed by: PODIATRIST

## 2022-06-06 PROCEDURE — 99999 PR PBB SHADOW E&M-EST. PATIENT-LVL IV: ICD-10-PCS | Mod: PBBFAC,,, | Performed by: PODIATRIST

## 2022-06-06 PROCEDURE — 3051F HG A1C>EQUAL 7.0%<8.0%: CPT | Mod: CPTII,S$GLB,, | Performed by: PODIATRIST

## 2022-06-06 PROCEDURE — 1159F MED LIST DOCD IN RCRD: CPT | Mod: CPTII,S$GLB,, | Performed by: PODIATRIST

## 2022-06-06 PROCEDURE — 11721 DEBRIDE NAIL 6 OR MORE: CPT | Mod: Q8,S$GLB,, | Performed by: PODIATRIST

## 2022-06-06 PROCEDURE — 99499 UNLISTED E&M SERVICE: CPT | Mod: S$GLB,,, | Performed by: PODIATRIST

## 2022-06-06 PROCEDURE — 3008F PR BODY MASS INDEX (BMI) DOCUMENTED: ICD-10-PCS | Mod: CPTII,S$GLB,, | Performed by: PODIATRIST

## 2022-06-06 PROCEDURE — 1160F RVW MEDS BY RX/DR IN RCRD: CPT | Mod: CPTII,S$GLB,, | Performed by: PODIATRIST

## 2022-06-06 PROCEDURE — 99999 PR PBB SHADOW E&M-EST. PATIENT-LVL IV: CPT | Mod: PBBFAC,,, | Performed by: PODIATRIST

## 2022-06-06 PROCEDURE — 3051F PR MOST RECENT HEMOGLOBIN A1C LEVEL 7.0 - < 8.0%: ICD-10-PCS | Mod: CPTII,S$GLB,, | Performed by: PODIATRIST

## 2022-06-06 PROCEDURE — 1160F PR REVIEW ALL MEDS BY PRESCRIBER/CLIN PHARMACIST DOCUMENTED: ICD-10-PCS | Mod: CPTII,S$GLB,, | Performed by: PODIATRIST

## 2022-06-06 PROCEDURE — 99499 NO LOS: ICD-10-PCS | Mod: S$GLB,,, | Performed by: PODIATRIST

## 2022-06-19 NOTE — PROGRESS NOTES
Subjective:     Patient ID: Julia Preston is a 61 y.o. female.    Chief Complaint: Nail Care (C/o pain at night, rates pain 8/10, Wears tennis shoes with socks, diabetic pt, last seen on 12/10/22 with PCP Dr. Lutz)    Julia is a 61 y.o. female who presents to the clinic for evaluation and treatment of high risk feet. Julia has a past medical history of CHF (congestive heart failure), Congestive heart failure, Congestive heart failure (2018), Diabetes mellitus, type 2, GERD (gastroesophageal reflux disease), High cholesterol, Hyperlipidemia, Hypertension, Pneumonia (6/2017 or 7/2017 per the patient), and Pneumonia (03/2018). The patient's chief complaint is long, thick toenails. This patient has documented high risk feet requiring routine maintenance secondary to diabetes mellitis and those secondary complications of diabetes, as mentioned..    PCP: MD Donna Reid MD (nephrology)  Date Last Seen by PCP: 12/10/2022 (/5/11/2022)    Current shoe gear:  Affected Foot: Tennis shoes     Unaffected Foot: Tennis shoes    Hemoglobin A1C   Date Value Ref Range Status   10/15/2021 7.6 (H) 4.7 - 5.6 % Final   07/01/2020 5.9 <=6.5 % Final       Patient Active Problem List   Diagnosis    GERD (gastroesophageal reflux disease)    Type II diabetes mellitus with peripheral circulatory disorder    Hyperlipidemia    Hypertension    PVD (peripheral vascular disease)    Severe obesity (BMI 35.0-39.9) with comorbidity       Medication List with Changes/Refills   Current Medications    ACCU-CHEK SIOMARA PLUS TEST STRP STRP        ACCU-CHEK SOFTCLIX LANCETS MISC        ACETAMINOPHEN (TYLENOL) 500 MG TABLET    Take 1-2 tablets by mouth.    ASPIRIN (ECOTRIN) 81 MG EC TABLET    Take 81 mg by mouth once daily.    ATORVASTATIN (LIPITOR) 80 MG TABLET    Take 80 mg by mouth once daily.    BD ALCOHOL SWABS PADM        BUMETANIDE (BUMEX) 2 MG TABLET    Take 2 mg by mouth.    CARVEDILOL (COREG) 12.5 MG TABLET         CLONIDINE (CATAPRES) 0.1 MG TABLET    Take 1 tablet by mouth.    COLCHICINE (COLCRYS) 0.6 MG TABLET    Take 1 tablet (0.6 mg total) by mouth once daily. Take one tablet daily. for 6 days    CYANOCOBALAMIN 500 MCG TABLET    Take 500 mcg by mouth.    ERGOCALCIFEROL (ERGOCALCIFEROL) 50,000 UNIT CAP    Take 1 capsule by mouth.    FLUTICASONE PROPIONATE (FLONASE) 50 MCG/ACTUATION NASAL SPRAY    1 spray by Nasal route.    FOLIC ACID (FOLVITE) 1 MG TABLET    Take 1 tablet by mouth.    FUROSEMIDE (LASIX) 20 MG TABLET        GABAPENTIN (NEURONTIN) 300 MG CAPSULE    Take 1 capsule (300 mg total) by mouth 2 (two) times daily.    HYDRALAZINE (APRESOLINE) 100 MG TABLET        INSULIN ASPART PROTAMINE-INSULIN ASPART (NOVOLOG 70/30) 100 UNIT/ML (70-30) INPN PEN    Inject 40 Units into the skin every morning.    INSULIN ASPART U-100 (NOVOLOG) 100 UNIT/ML INJECTION    Inject 40 Units into the skin.    LINACLOTIDE (LINZESS) 145 MCG CAP CAPSULE    Take 1 capsule by mouth.    NIFEDIPINE (PROCARDIA-XL) 60 MG (OSM) 24 HR TABLET    Take 60 mg by mouth once daily.    NITROGLYCERIN (NITROSTAT) 0.4 MG SL TABLET    Place 0.4 mg under the tongue every 5 (five) minutes as needed for Chest pain.    NOVOLIN 70/30 U-100 INSULIN 100 UNIT/ML (70-30) INJECTION        NOVOLOG MIX 70-30 U-100 INSULN 100 UNIT/ML (70-30) SOLN        PANTOPRAZOLE (PROTONIX) 40 MG TABLET    Take 40 mg by mouth.    PAZEO 0.7 % DROP        XARELTO 2.5 MG TAB           Review of patient's allergies indicates:   Allergen Reactions    Lortab [hydrocodone-acetaminophen] Itching    Morphine Other (See Comments)     Hallucinations  Hallucinations         Past Surgical History:   Procedure Laterality Date    BREAST BIOPSY      benign, pt states she was about 27yrs old    CARDIAC SURGERY  2013    CATARACT EXTRACTION Right 05/10/2021     SECTION      HYSTERECTOMY      OOPHORECTOMY         Family History   Problem Relation Age of Onset    Hypertension Mother      "Diabetes Mother     Hypertension Brother     Diabetes Brother     Seizures Son        Social History     Socioeconomic History    Marital status:    Tobacco Use    Smoking status: Never Smoker    Smokeless tobacco: Never Used   Substance and Sexual Activity    Alcohol use: No    Drug use: No    Sexual activity: Yes     Partners: Male       Vitals:    06/06/22 1451   Weight: 105.7 kg (233 lb)   Height: 5' 6" (1.676 m)       Hemoglobin A1C   Date Value Ref Range Status   10/15/2021 7.6 (H) 4.7 - 5.6 % Final   07/01/2020 5.9 <=6.5 % Final       Review of Systems   Constitutional: Negative for chills and fever.   Respiratory: Negative for shortness of breath.    Cardiovascular: Negative for chest pain, palpitations, orthopnea, claudication and leg swelling.   Gastrointestinal: Negative for diarrhea, nausea and vomiting.   Musculoskeletal: Negative for joint pain.   Skin: Negative for rash.   Neurological: Positive for tingling and sensory change.   Psychiatric/Behavioral: Negative.              Objective:      PHYSICAL EXAM: Apperance: Alert and orient in no distress,well developed, and with good attention to grooming and body habits  Patient presents ambulating in tennis shoes.   LOWER EXTREMITY EXAM:  VASCULAR: Dorsalis pedis pulses 0/4(monphasic with doppler) bilateral and Posterior Tibial pulses 1/4 bilateral. Capillary fill time <4 seconds bilateral. Mild edema observed right foot. Varicosities present bilateral. Skin temperature of the lower extremities is warm to warm, proximal to distal. Hair growth absent bilateral.   DERMATOLOGICAL: No skin rashes, lesions, or ulcers observed bilateral. Nails 1,2,3,4,5 bilateral elongated, thickened, and discolored with subungual debris. Webspaces 1,2,3,4 clean, dry and without evidence of break in skin integrity bilateral. Minimal hyperkeratotic tissue noted to left plantar hallux. Palpable fixated subcutaneous nodule noted to right medial 2nd toe. "   NEUROLOGICAL: Light touch, sharp-dull, proprioception all present and equal bilaterally.  Vibratory sensation diminished at bilateral hallux. Protective sensation absent at 4/10 sites as tested with a [place]-Brandy 5.07 monofilament.   MUSCULOSKELETAL: Muscle strength is 5/5 for foot inverters, everters, plantarflexors, and dorsiflexors. Muscle tone is normal. (-) pain on palpation of right 2nd toe. (-) pain on palpation of right lateral foot at the sinus tarsi.           Assessment:       ICD-10-CM ICD-9-CM   1. Type II diabetes mellitus with neurological manifestations  E11.49 250.60   2. PVD (peripheral vascular disease)  I73.9 443.9   3. Dermatophytosis of nail  B35.1 110.1       Plan:   Type II diabetes mellitus with neurological manifestations    PVD (peripheral vascular disease)    Dermatophytosis of nail      I counseled the patient on her conditions, regarding findings of my examination, my impressions, and usual treatment plan.   Greater than 50% of this visit spent on counseling and coordination of care.  Greater than 15 minutes of a 20 minute appointment spent on education about the diabetic foot, neuropathy, and prevention of limb loss.  Shoe inspection. Diabetic Foot Education. Patient reminded of the importance of good nutrition and blood sugar control to help prevent podiatric complications of diabetes. Patient instructed on proper foot hygeine. We discussed wearing proper shoe gear, daily foot inspections, never walking without protective shoe gear, never putting sharp instruments to feet.    With patient's permission, nails 1-5 bilateral were debrided/trimmed in length and thickness aggressively to their soft tissue attachment mechanically and with electric , removing all offending nail and debris. Patient relates relief following the procedure.  Patient  will continue to monitor the areas daily, inspect feet, wear protective shoe gear when ambulatory, moisturizer to maintain skin  integrity. Patient reminded of the importance of good nutrition and blood sugar control to help prevent podiatric complications of diabetes.  Patient to return 3 months or sooner if needed.              Viet Muir DPM  [facility]

## 2022-06-20 ENCOUNTER — OFFICE VISIT (OUTPATIENT)
Dept: OBSTETRICS AND GYNECOLOGY | Facility: CLINIC | Age: 62
End: 2022-06-20
Payer: MEDICARE

## 2022-06-20 VITALS
SYSTOLIC BLOOD PRESSURE: 114 MMHG | DIASTOLIC BLOOD PRESSURE: 52 MMHG | HEIGHT: 66 IN | BODY MASS INDEX: 37.06 KG/M2 | WEIGHT: 230.63 LBS

## 2022-06-20 DIAGNOSIS — Z12.31 SCREENING MAMMOGRAM, ENCOUNTER FOR: ICD-10-CM

## 2022-06-20 DIAGNOSIS — Z12.4 SCREENING FOR CERVICAL CANCER: ICD-10-CM

## 2022-06-20 DIAGNOSIS — Z01.419 ENCOUNTER FOR GYNECOLOGICAL EXAMINATION (GENERAL) (ROUTINE) WITHOUT ABNORMAL FINDINGS: Primary | ICD-10-CM

## 2022-06-20 PROCEDURE — 3074F PR MOST RECENT SYSTOLIC BLOOD PRESSURE < 130 MM HG: ICD-10-PCS | Mod: CPTII,S$GLB,, | Performed by: OBSTETRICS & GYNECOLOGY

## 2022-06-20 PROCEDURE — 3008F PR BODY MASS INDEX (BMI) DOCUMENTED: ICD-10-PCS | Mod: CPTII,S$GLB,, | Performed by: OBSTETRICS & GYNECOLOGY

## 2022-06-20 PROCEDURE — 4010F PR ACE/ARB THEARPY RXD/TAKEN: ICD-10-PCS | Mod: CPTII,S$GLB,, | Performed by: OBSTETRICS & GYNECOLOGY

## 2022-06-20 PROCEDURE — 3008F BODY MASS INDEX DOCD: CPT | Mod: CPTII,S$GLB,, | Performed by: OBSTETRICS & GYNECOLOGY

## 2022-06-20 PROCEDURE — 4010F ACE/ARB THERAPY RXD/TAKEN: CPT | Mod: CPTII,S$GLB,, | Performed by: OBSTETRICS & GYNECOLOGY

## 2022-06-20 PROCEDURE — G0101 CA SCREEN;PELVIC/BREAST EXAM: HCPCS | Mod: S$GLB,,, | Performed by: OBSTETRICS & GYNECOLOGY

## 2022-06-20 PROCEDURE — 3078F DIAST BP <80 MM HG: CPT | Mod: CPTII,S$GLB,, | Performed by: OBSTETRICS & GYNECOLOGY

## 2022-06-20 PROCEDURE — 3074F SYST BP LT 130 MM HG: CPT | Mod: CPTII,S$GLB,, | Performed by: OBSTETRICS & GYNECOLOGY

## 2022-06-20 PROCEDURE — 1159F MED LIST DOCD IN RCRD: CPT | Mod: CPTII,S$GLB,, | Performed by: OBSTETRICS & GYNECOLOGY

## 2022-06-20 PROCEDURE — G0101 PR CA SCREEN;PELVIC/BREAST EXAM: ICD-10-PCS | Mod: S$GLB,,, | Performed by: OBSTETRICS & GYNECOLOGY

## 2022-06-20 PROCEDURE — 1159F PR MEDICATION LIST DOCUMENTED IN MEDICAL RECORD: ICD-10-PCS | Mod: CPTII,S$GLB,, | Performed by: OBSTETRICS & GYNECOLOGY

## 2022-06-20 PROCEDURE — 99999 PR PBB SHADOW E&M-EST. PATIENT-LVL IV: ICD-10-PCS | Mod: PBBFAC,,, | Performed by: OBSTETRICS & GYNECOLOGY

## 2022-06-20 PROCEDURE — 99999 PR PBB SHADOW E&M-EST. PATIENT-LVL IV: CPT | Mod: PBBFAC,,, | Performed by: OBSTETRICS & GYNECOLOGY

## 2022-06-20 PROCEDURE — 3078F PR MOST RECENT DIASTOLIC BLOOD PRESSURE < 80 MM HG: ICD-10-PCS | Mod: CPTII,S$GLB,, | Performed by: OBSTETRICS & GYNECOLOGY

## 2022-06-20 RX ORDER — FERROUS SULFATE 325(65) MG
TABLET, DELAYED RELEASE (ENTERIC COATED) ORAL
COMMUNITY
Start: 2021-07-03

## 2022-06-20 RX ORDER — IPRATROPIUM BROMIDE 42 UG/1
2 SPRAY, METERED NASAL
COMMUNITY
Start: 2022-03-10 | End: 2023-03-10

## 2022-06-20 RX ORDER — AZELASTINE HYDROCHLORIDE 0.5 MG/ML
SOLUTION/ DROPS OPHTHALMIC
COMMUNITY

## 2022-06-20 RX ORDER — METOLAZONE 2.5 MG/1
TABLET ORAL
COMMUNITY
Start: 2022-01-25

## 2022-06-20 RX ORDER — LISINOPRIL 10 MG/1
1 TABLET ORAL DAILY
COMMUNITY
Start: 2021-11-01 | End: 2022-11-26

## 2022-06-20 RX ORDER — FEXOFENADINE HCL 30 MG/5 ML
SUSPENSION, ORAL (FINAL DOSE FORM) ORAL
COMMUNITY
Start: 2021-07-14

## 2022-06-20 RX ORDER — DEXTROSE 4 G
TABLET,CHEWABLE ORAL
COMMUNITY
Start: 2022-03-10

## 2022-06-20 RX ORDER — CALCIUM CARBONATE 200(500)MG
500 TABLET,CHEWABLE ORAL
COMMUNITY
Start: 2021-07-02 | End: 2022-07-02

## 2022-06-20 NOTE — PROGRESS NOTES
Subjective:       Patient ID: Julia Preston is a 61 y.o. female.    Chief Complaint:  Annual Exam      History of Present Illness  HPI  Annual Exam-Postmenopausal  Patient presents for annual exam. The patient has no complaints today. The patient is not currently sexually active. GYN screening history: last pap: was normal and patient does not recall when last pap was and last mammogram: approximate date 2021 and was normal. The patient is not currently taking hormone replacement therapy. Patient denies post-menopausal vaginal bleeding. The patient wears seatbelts: no. The patient participates in regular exercise: yes. Walks daily 30 min; Has the patient ever been transfused or tattooed?: yes.+transfused;  The patient reports that there is not domestic violence in her life.  Colonoscopy due   Denies urinary leakage  Problems falling asleep  Denies hot flushes   GYN & OB History  Patient's last menstrual period was 10/11/1988 (approximate).   Date of Last Pap: No result found    OB History    Para Term  AB Living   5 5 5     6   SAB IAB Ectopic Multiple Live Births         1 6      # Outcome Date GA Lbr Erik/2nd Weight Sex Delivery Anes PTL Lv   5 Term         LINA   4A Term      CS-Unspec   LINA   4B Term      CS-Unspec   LINA   3 Term      Vag-Spont   LINA   2 Term      Vag-Spont   LINA   1 Term      Vag-Spont   LINA       Review of Systems  Review of Systems   Psychiatric/Behavioral: Positive for sleep disturbance.   All other systems reviewed and are negative.          Objective:      Physical Exam:   Constitutional: She is oriented to person, place, and time. She appears well-developed and well-nourished.     Eyes: Pupils are equal, round, and reactive to light. Conjunctivae and EOM are normal.      Pulmonary/Chest: Effort normal. Right breast exhibits no mass, no nipple discharge, no skin change and no tenderness. Left breast exhibits no mass, no nipple discharge, no skin change and no  tenderness. Breasts are symmetrical.        Abdominal: Soft.     Genitourinary:    Vagina, right adnexa, left adnexa and rectum normal.      Pelvic exam was performed with patient supine.   The external female genitalia was normal.   Labial bartholins normal.Right adnexum displays no mass and no tenderness. Left adnexum displays no mass and no tenderness. No erythema,  no vaginal discharge, bleeding, rectocele, cystocele or unspecified prolapse of vaginal walls in the vagina. Vagina was moist.Cervix is absent.Uterus is absent. Normal urethral meatus.Urethra findings: no urethral massBladder findings: no bladder distention and no bladder tenderness          Musculoskeletal: Normal range of motion and moves all extremeties.       Neurological: She is alert and oriented to person, place, and time.    Skin: Skin is warm.    Psychiatric: She has a normal mood and affect. Her behavior is normal.           Assessment:     Encounter Diagnoses   Name Primary?    Encounter for gynecological examination (general) (routine) without abnormal findings Yes    Screening for cervical cancer     Screening mammogram, encounter for            Plan:      Continue annual well woman exam.  Pap not indicated due to hx of nml pap and hx of kusum  mammo ordered, continue yearly until age 73  Osteoporosis prevention; 1200mg calcium/d with source of vitamin d  Continue diet, exercise, weight loss

## 2022-07-07 ENCOUNTER — HOSPITAL ENCOUNTER (OUTPATIENT)
Dept: RADIOLOGY | Facility: HOSPITAL | Age: 62
Discharge: HOME | End: 2022-07-07
Attending: OBSTETRICS & GYNECOLOGY
Payer: MEDICARE

## 2022-07-07 DIAGNOSIS — Z12.31 ENCOUNTER FOR SCREENING MAMMOGRAM FOR MALIGNANT NEOPLASM OF BREAST: ICD-10-CM

## 2022-07-07 PROCEDURE — 77063 BREAST TOMOSYNTHESIS BI: CPT | Mod: TC

## 2022-07-07 PROCEDURE — 77067 MAMMO DIGITAL SCREENING BILAT WITH TOMO: ICD-10-PCS | Mod: 26,,, | Performed by: RADIOLOGY

## 2022-07-07 PROCEDURE — 77063 MAMMO DIGITAL SCREENING BILAT WITH TOMO: ICD-10-PCS | Mod: 26,,, | Performed by: RADIOLOGY

## 2022-07-07 PROCEDURE — 77063 BREAST TOMOSYNTHESIS BI: CPT | Mod: 26,,, | Performed by: RADIOLOGY

## 2022-07-07 PROCEDURE — 77067 SCR MAMMO BI INCL CAD: CPT | Mod: 26,,, | Performed by: RADIOLOGY

## 2022-08-12 ENCOUNTER — HOSPITAL ENCOUNTER (OUTPATIENT)
Dept: RADIOLOGY | Facility: HOSPITAL | Age: 62
Discharge: HOME | End: 2022-08-12
Attending: PODIATRIST
Payer: MEDICARE

## 2022-08-12 ENCOUNTER — OFFICE VISIT (OUTPATIENT)
Dept: PODIATRY | Facility: CLINIC | Age: 62
End: 2022-08-12
Payer: MEDICARE

## 2022-08-12 VITALS — WEIGHT: 230 LBS | HEIGHT: 66 IN | BODY MASS INDEX: 36.96 KG/M2

## 2022-08-12 DIAGNOSIS — B35.1 DERMATOPHYTOSIS OF NAIL: ICD-10-CM

## 2022-08-12 DIAGNOSIS — M79.672 LEFT FOOT PAIN: ICD-10-CM

## 2022-08-12 DIAGNOSIS — M79.672 LEFT FOOT PAIN: Primary | ICD-10-CM

## 2022-08-12 DIAGNOSIS — E11.49 TYPE II DIABETES MELLITUS WITH NEUROLOGICAL MANIFESTATIONS: ICD-10-CM

## 2022-08-12 DIAGNOSIS — I73.9 PVD (PERIPHERAL VASCULAR DISEASE): ICD-10-CM

## 2022-08-12 PROCEDURE — 73630 X-RAY EXAM OF FOOT: CPT | Mod: 26,LT,, | Performed by: RADIOLOGY

## 2022-08-12 PROCEDURE — 11721 PR DEBRIDEMENT OF NAILS, 6 OR MORE: ICD-10-PCS | Mod: Q8,S$GLB,, | Performed by: PODIATRIST

## 2022-08-12 PROCEDURE — 3008F PR BODY MASS INDEX (BMI) DOCUMENTED: ICD-10-PCS | Mod: CPTII,S$GLB,, | Performed by: PODIATRIST

## 2022-08-12 PROCEDURE — 3008F BODY MASS INDEX DOCD: CPT | Mod: CPTII,S$GLB,, | Performed by: PODIATRIST

## 2022-08-12 PROCEDURE — 1160F PR REVIEW ALL MEDS BY PRESCRIBER/CLIN PHARMACIST DOCUMENTED: ICD-10-PCS | Mod: CPTII,S$GLB,, | Performed by: PODIATRIST

## 2022-08-12 PROCEDURE — 99999 PR PBB SHADOW E&M-EST. PATIENT-LVL V: ICD-10-PCS | Mod: PBBFAC,,, | Performed by: PODIATRIST

## 2022-08-12 PROCEDURE — 99213 PR OFFICE/OUTPT VISIT, EST, LEVL III, 20-29 MIN: ICD-10-PCS | Mod: 25,S$GLB,, | Performed by: PODIATRIST

## 2022-08-12 PROCEDURE — 99213 OFFICE O/P EST LOW 20 MIN: CPT | Mod: 25,S$GLB,, | Performed by: PODIATRIST

## 2022-08-12 PROCEDURE — 73630 X-RAY EXAM OF FOOT: CPT | Mod: TC,LT

## 2022-08-12 PROCEDURE — 4010F PR ACE/ARB THEARPY RXD/TAKEN: ICD-10-PCS | Mod: CPTII,S$GLB,, | Performed by: PODIATRIST

## 2022-08-12 PROCEDURE — 11721 DEBRIDE NAIL 6 OR MORE: CPT | Mod: Q8,S$GLB,, | Performed by: PODIATRIST

## 2022-08-12 PROCEDURE — 1159F MED LIST DOCD IN RCRD: CPT | Mod: CPTII,S$GLB,, | Performed by: PODIATRIST

## 2022-08-12 PROCEDURE — 1159F PR MEDICATION LIST DOCUMENTED IN MEDICAL RECORD: ICD-10-PCS | Mod: CPTII,S$GLB,, | Performed by: PODIATRIST

## 2022-08-12 PROCEDURE — 99999 PR PBB SHADOW E&M-EST. PATIENT-LVL V: CPT | Mod: PBBFAC,,, | Performed by: PODIATRIST

## 2022-08-12 PROCEDURE — 1160F RVW MEDS BY RX/DR IN RCRD: CPT | Mod: CPTII,S$GLB,, | Performed by: PODIATRIST

## 2022-08-12 PROCEDURE — 4010F ACE/ARB THERAPY RXD/TAKEN: CPT | Mod: CPTII,S$GLB,, | Performed by: PODIATRIST

## 2022-08-12 PROCEDURE — 73630 XR FOOT COMPLETE 3 VIEW LEFT: ICD-10-PCS | Mod: 26,LT,, | Performed by: RADIOLOGY

## 2022-08-12 NOTE — PROGRESS NOTES
Subjective:     Patient ID: Julia Preston is a 61 y.o. female.    Chief Complaint: Nail Care (C/o pain to left foot pain, rates pain 7/10, Diabetic pt, last seen on 07/06/22 with  PCP Dr. Lutz)    Julia is a 61 y.o. female who presents to the clinic for evaluation and treatment of high risk feet. Julia has a past medical history of CHF (congestive heart failure), Congestive heart failure, Congestive heart failure (2018), Diabetes mellitus, type 2, GERD (gastroesophageal reflux disease), High cholesterol, Hyperlipidemia, Hypertension, Pneumonia (6/2017 or 7/2017 per the patient), and Pneumonia (03/2018). The patient's chief complaint is long, thick toenails. This patient has documented high risk feet requiring routine maintenance secondary to diabetes mellitis and those secondary complications of diabetes, as mentioned. Patient complains of pain in left foot. Patient states pain started about 2 weeks ago. Patient states the pain is worse when walking and at nighttime. Patient rates pain 7/10. Patient points to left MPJ. Patient has no other pedal complaints at this time.     PCP: MD Donna Reid MD (nephrology)  Date Last Seen by PCP: 07/06/2022 (/5/11/2022)    Current shoe gear:  Affected Foot: Tennis shoes     Unaffected Foot: Tennis shoes    Hemoglobin A1C   Date Value Ref Range Status   10/15/2021 7.6 (H) 4.7 - 5.6 % Final   07/01/2020 5.9 <=6.5 % Final       Patient Active Problem List   Diagnosis    GERD (gastroesophageal reflux disease)    Type II diabetes mellitus with peripheral circulatory disorder    Hyperlipidemia    Hypertension    PVD (peripheral vascular disease)    Severe obesity (BMI 35.0-39.9) with comorbidity       Medication List with Changes/Refills   Current Medications    ACCU-CHEK [place] PLUS TEST STRP STRP        ACCU-CHEK SOFTCLIX LANCETS MISC        ACETAMINOPHEN (TYLENOL) 500 MG TABLET    Take 1-2 tablets by mouth.    ASPIRIN (ECOTRIN) 81 MG EC TABLET     Take 81 mg by mouth once daily.    ATORVASTATIN (LIPITOR) 80 MG TABLET    Take 80 mg by mouth once daily.    AZELASTINE (OPTIVAR) 0.05 % OPHTHALMIC SOLUTION    azelastine 0.05 % eye drops   INSTILL 1 DROP INTO EACH EYE TWICE DAILY    BD ALCOHOL SWABS PADM        BLOOD-GLUCOSE METER MISC    Use to check blood sugar 4 times per day-before meals and before bedtime.    BUMETANIDE (BUMEX) 2 MG TABLET    Take 2 mg by mouth.    CALCIUM CARBONATE (TUMS) 200 MG CALCIUM (500 MG) CHEWABLE TABLET    Take 500 mg by mouth.    CARVEDILOL (COREG) 12.5 MG TABLET        CYANOCOBALAMIN 500 MCG TABLET    Take 500 mcg by mouth.    ERGOCALCIFEROL (ERGOCALCIFEROL) 50,000 UNIT CAP    Take 1 capsule by mouth.    FERROUS SULFATE 325 (65 FE) MG EC TABLET    TAKE 1 TABLET BY MOUTH TWICE DAILY DO NOT CRUSH CHEW OR SPLIT    FLUTICASONE PROPIONATE (FLONASE) 50 MCG/ACTUATION NASAL SPRAY    1 spray by Nasal route.    FOLIC ACID (FOLVITE) 1 MG TABLET    Take 1 tablet by mouth.    FUROSEMIDE (LASIX) 20 MG TABLET        GABAPENTIN (NEURONTIN) 300 MG CAPSULE    Take 1 capsule (300 mg total) by mouth 2 (two) times daily.    HUMIDIFIERS MISC    Use nightly as needed for cough, cold, or congestion symptoms.    HYDRALAZINE (APRESOLINE) 100 MG TABLET        INSULIN ASPART PROTAMINE-INSULIN ASPART (NOVOLOG 70/30) 100 UNIT/ML (70-30) INPN PEN    Inject 40 Units into the skin every morning.    INSULIN ASPART U-100 (NOVOLOG) 100 UNIT/ML INJECTION    Inject 40 Units into the skin.    IPRATROPIUM (ATROVENT) 42 MCG (0.06 %) NASAL SPRAY    2 sprays by Nasal route.    LINACLOTIDE (LINZESS) 145 MCG CAP CAPSULE    Take 1 capsule by mouth.    LISINOPRIL 10 MG TABLET    Take 1 tablet by mouth once daily.    METOLAZONE (ZAROXOLYN) 2.5 MG TABLET        NIFEDIPINE (PROCARDIA-XL) 60 MG (OSM) 24 HR TABLET    Take 60 mg by mouth once daily.    NITROGLYCERIN (NITROSTAT) 0.4 MG SL TABLET    Place 0.4 mg under the tongue every 5 (five) minutes as needed for Chest pain.     "NOVOLIN 70/30 U-100 INSULIN 100 UNIT/ML (70-30) INJECTION        NOVOLOG MIX 70-30 U-100 INSULN 100 UNIT/ML (70-30) SOLN        PANTOPRAZOLE (PROTONIX) 40 MG TABLET    Take 40 mg by mouth.    PAZEO 0.7 % DROP        SODIUM CHLORIDE (OCEAN) 0.65 % NASAL SPRAY    1 spray by Nasal route 2 (two) times daily as needed.    XARELTO 2.5 MG TAB           Review of patient's allergies indicates:   Allergen Reactions    Hydrocodone-acetaminophen Itching and Hives    Hydrocodone Hives    Morphine Other (See Comments)     Hallucinations  Hallucinations         Past Surgical History:   Procedure Laterality Date    BREAST BIOPSY      benign, pt states she was about 27yrs old    CARDIAC SURGERY  2013    CATARACT EXTRACTION Right 05/10/2021     SECTION      HYSTERECTOMY      OOPHORECTOMY         Family History   Problem Relation Age of Onset    Hypertension Mother     Diabetes Mother     Hypertension Brother     Diabetes Brother     Seizures Son        Social History     Socioeconomic History    Marital status:    Tobacco Use    Smoking status: Never Smoker    Smokeless tobacco: Never Used   Substance and Sexual Activity    Alcohol use: No    Drug use: No    Sexual activity: Yes     Partners: Male       Vitals:    22 1145   Weight: 104.3 kg (230 lb)   Height: 5' 6" (1.676 m)   PainSc: 0-No pain   PainLoc: Foot       Hemoglobin A1C   Date Value Ref Range Status   10/15/2021 7.6 (H) 4.7 - 5.6 % Final   2020 5.9 <=6.5 % Final       Review of Systems   Constitutional: Negative for chills and fever.   Respiratory: Negative for shortness of breath.    Cardiovascular: Negative for chest pain, palpitations, orthopnea, claudication and leg swelling.   Gastrointestinal: Negative for diarrhea, nausea and vomiting.   Musculoskeletal: Negative for joint pain (left MPJ pain).   Skin: Negative for rash.   Neurological: Positive for tingling and sensory change.   Psychiatric/Behavioral: Negative.  "             Objective:      PHYSICAL EXAM: Apperance: Alert and orient in no distress,well developed, and with good attention to grooming and body habits  Patient presents ambulating in tennis shoes.   LOWER EXTREMITY EXAM:  VASCULAR: Dorsalis pedis pulses 0/4(monphasic with doppler) bilateral and Posterior Tibial pulses 1/4 bilateral. Capillary fill time <4 seconds bilateral. Mild edema observed right foot. Varicosities present bilateral. Skin temperature of the lower extremities is warm to warm, proximal to distal. Hair growth absent bilateral.   DERMATOLOGICAL: No skin rashes, lesions, or ulcers observed bilateral. Nails 1,2,3,4,5 bilateral elongated, thickened, and discolored with subungual debris. Webspaces 1,2,3,4 clean, dry and without evidence of break in skin integrity bilateral. Minimal hyperkeratotic tissue noted to left plantar hallux. Palpable fixated subcutaneous nodule noted to right medial 2nd toe.   NEUROLOGICAL: Light touch, sharp-dull, proprioception all present and equal bilaterally.  Vibratory sensation diminished at bilateral hallux. Protective sensation absent at 4/10 sites as tested with a [place]-Brandy 5.07 monofilament.   MUSCULOSKELETAL: Muscle strength is 5/5 for foot inverters, everters, plantarflexors, and dorsiflexors. Muscle tone is normal.  (+) pain on palpation of left 2nd, 3rd, 4th MPJ. Pain on left 2,3,4 dorsiflexion and plantarflexion.        Assessment:       ICD-10-CM ICD-9-CM   1. Left foot pain  M79.672 729.5   2. Type II diabetes mellitus with neurological manifestations  E11.49 250.60   3. PVD (peripheral vascular disease)  I73.9 443.9   4. Dermatophytosis of nail  B35.1 110.1       Plan:   Left foot pain  -     X-Ray Foot Complete Left; Future; Expected date: 08/12/2022    Type II diabetes mellitus with neurological manifestations    PVD (peripheral vascular disease)    Dermatophytosis of nail      I counseled the patient on her conditions, regarding findings of my  examination, my impressions, and usual treatment plan.   Greater than 50% of this visit spent on counseling and coordination of care.  Greater than 15 minutes of a 20 minute appointment spent on education about the diabetic foot, neuropathy, and prevention of limb loss.  Shoe inspection. Diabetic Foot Education. Patient reminded of the importance of good nutrition and blood sugar control to help prevent podiatric complications of diabetes. Patient instructed on proper foot hygeine. We discussed wearing proper shoe gear, daily foot inspections, never walking without protective shoe gear, never putting sharp instruments to feet.    With patient's permission, nails 1-5 bilateral were debrided/trimmed in length and thickness aggressively to their soft tissue attachment mechanically and with electric , removing all offending nail and debris. Patient relates relief following the procedure.  Ordered left foot x-rays to be completed today.   Patient was fitted with surgical shoe and instructed on proper usage. This should be worn daily for a minimum of 2 weeks at all times when ambulating. Patient instructed not to drive with walking boot if on right foot.   Patient  will continue to monitor the areas daily, inspect feet, wear protective shoe gear when ambulatory, moisturizer to maintain skin integrity. Patient reminded of the importance of good nutrition and blood sugar control to help prevent podiatric complications of diabetes.  Patient to return 3 months or sooner if needed.          Viet Muir DPM  [facility]

## 2022-10-08 NOTE — PROGRESS NOTES
Patient, Julia Preston (MRN #[id number]), presented with a recorded BMI of 37.12 kg/m^2 and a documented comorbidity(s):  - Diabetes Mellitus Type 2  to which the severe obesity is a contributing factor. This is consistent with the definition of severe obesity (BMI 35.0-39.9) with comorbidity (ICD-10 E66.01, Z68.35). The patient's severe obesity was monitored, evaluated, addressed and/or treated. This addendum to the medical record is made on 10/08/2022.

## 2022-10-14 ENCOUNTER — OFFICE VISIT (OUTPATIENT)
Dept: PODIATRY | Facility: CLINIC | Age: 62
End: 2022-10-14
Payer: MEDICARE

## 2022-10-14 VITALS — HEIGHT: 66 IN | WEIGHT: 229.94 LBS | BODY MASS INDEX: 36.95 KG/M2

## 2022-10-14 DIAGNOSIS — I73.9 PVD (PERIPHERAL VASCULAR DISEASE): ICD-10-CM

## 2022-10-14 DIAGNOSIS — E11.49 TYPE II DIABETES MELLITUS WITH NEUROLOGICAL MANIFESTATIONS: Primary | ICD-10-CM

## 2022-10-14 DIAGNOSIS — B35.1 DERMATOPHYTOSIS OF NAIL: ICD-10-CM

## 2022-10-14 PROCEDURE — 99999 PR PBB SHADOW E&M-EST. PATIENT-LVL IV: ICD-10-PCS | Mod: PBBFAC,,, | Performed by: PODIATRIST

## 2022-10-14 PROCEDURE — 4010F PR ACE/ARB THEARPY RXD/TAKEN: ICD-10-PCS | Mod: CPTII,S$GLB,, | Performed by: PODIATRIST

## 2022-10-14 PROCEDURE — 1160F PR REVIEW ALL MEDS BY PRESCRIBER/CLIN PHARMACIST DOCUMENTED: ICD-10-PCS | Mod: CPTII,S$GLB,, | Performed by: PODIATRIST

## 2022-10-14 PROCEDURE — 99499 NO LOS: ICD-10-PCS | Mod: S$GLB,,, | Performed by: PODIATRIST

## 2022-10-14 PROCEDURE — 4010F ACE/ARB THERAPY RXD/TAKEN: CPT | Mod: CPTII,S$GLB,, | Performed by: PODIATRIST

## 2022-10-14 PROCEDURE — 1159F PR MEDICATION LIST DOCUMENTED IN MEDICAL RECORD: ICD-10-PCS | Mod: CPTII,S$GLB,, | Performed by: PODIATRIST

## 2022-10-14 PROCEDURE — 1159F MED LIST DOCD IN RCRD: CPT | Mod: CPTII,S$GLB,, | Performed by: PODIATRIST

## 2022-10-14 PROCEDURE — 1160F RVW MEDS BY RX/DR IN RCRD: CPT | Mod: CPTII,S$GLB,, | Performed by: PODIATRIST

## 2022-10-14 PROCEDURE — 11721 DEBRIDE NAIL 6 OR MORE: CPT | Mod: Q8,S$GLB,, | Performed by: PODIATRIST

## 2022-10-14 PROCEDURE — 99999 PR PBB SHADOW E&M-EST. PATIENT-LVL IV: CPT | Mod: PBBFAC,,, | Performed by: PODIATRIST

## 2022-10-14 PROCEDURE — 99499 UNLISTED E&M SERVICE: CPT | Mod: S$GLB,,, | Performed by: PODIATRIST

## 2022-10-14 PROCEDURE — 11721 PR DEBRIDEMENT OF NAILS, 6 OR MORE: ICD-10-PCS | Mod: Q8,S$GLB,, | Performed by: PODIATRIST

## 2022-10-14 RX ORDER — TIZANIDINE 4 MG/1
4 TABLET ORAL NIGHTLY PRN
Qty: 20 TABLET | Refills: 0 | Status: SHIPPED | OUTPATIENT
Start: 2022-10-14 | End: 2022-11-03

## 2022-10-14 NOTE — PROGRESS NOTES
Subjective:     Patient ID: Julia Preston is a 62 y.o. female.    Chief Complaint: Nail Care (Pt c/o BL foot swelling, Diabetic pt wears tennis shoes , PCP Dr. Lutz last seen 10-6-22)    Julia is a 62 y.o. female who presents to the clinic for evaluation and treatment of high risk feet. Julia has a past medical history of CHF (congestive heart failure), Congestive heart failure, Congestive heart failure (2018), Diabetes mellitus, type 2, GERD (gastroesophageal reflux disease), High cholesterol, Hyperlipidemia, Hypertension, Pneumonia (6/2017 or 7/2017 per the patient), and Pneumonia (03/2018). The patient's chief complaint is long, thick toenails. This patient has documented high risk feet requiring routine maintenance secondary to diabetes mellitis and those secondary complications of diabetes, as mentioned. Patient complains of feet swelling. Patient states the right foot cramps sometimes. Patient has no other pedal complaints at this time.     PCP: MD Donna Reid MD (nephrology)  Date Last Seen by PCP: 10/06/2022 (/5/11/2022)    Current shoe gear:  Affected Foot: Tennis shoes     Unaffected Foot: Tennis shoes    Hemoglobin A1C   Date Value Ref Range Status   10/15/2021 7.6 (H) 4.7 - 5.6 % Final   07/01/2020 5.9 <=6.5 % Final       Patient Active Problem List   Diagnosis    GERD (gastroesophageal reflux disease)    Type II diabetes mellitus with peripheral circulatory disorder    Hyperlipidemia    Hypertension    PVD (peripheral vascular disease)    Severe obesity (BMI 35.0-39.9) with comorbidity       Medication List with Changes/Refills   New Medications    TIZANIDINE (ZANAFLEX) 4 MG TABLET    Take 1 tablet (4 mg total) by mouth nightly as needed (AS NEED AT NIGHTTIME).   Current Medications    ACCU-CHEK SIOMARA PLUS TEST STRP STRP        ACCU-CHEK SOFTCLIX LANCETS MISC        ACETAMINOPHEN (TYLENOL) 500 MG TABLET    Take 1-2 tablets by mouth.    ASPIRIN (ECOTRIN) 81 MG EC TABLET     Take 81 mg by mouth once daily.    ATORVASTATIN (LIPITOR) 80 MG TABLET    Take 80 mg by mouth once daily.    AZELASTINE (OPTIVAR) 0.05 % OPHTHALMIC SOLUTION    azelastine 0.05 % eye drops   INSTILL 1 DROP INTO EACH EYE TWICE DAILY    BD ALCOHOL SWABS PADM        BLOOD-GLUCOSE METER MISC    Use to check blood sugar 4 times per day-before meals and before bedtime.    BUMETANIDE (BUMEX) 2 MG TABLET    Take 2 mg by mouth.    CALCIUM CARBONATE (TUMS) 200 MG CALCIUM (500 MG) CHEWABLE TABLET    Take 500 mg by mouth.    CARVEDILOL (COREG) 12.5 MG TABLET        CYANOCOBALAMIN 500 MCG TABLET    Take 500 mcg by mouth.    EPOETIN DOMONIQUE-EPBX 20,000 UNIT/2 ML SOLN    Inject 10,000 Units into the skin.    ERGOCALCIFEROL (ERGOCALCIFEROL) 50,000 UNIT CAP    Take 1 capsule by mouth.    FERROUS SULFATE 325 (65 FE) MG EC TABLET    TAKE 1 TABLET BY MOUTH TWICE DAILY DO NOT CRUSH CHEW OR SPLIT    FLUTICASONE PROPIONATE (FLONASE) 50 MCG/ACTUATION NASAL SPRAY    1 spray by Nasal route.    FOLIC ACID (FOLVITE) 1 MG TABLET    Take 1 tablet by mouth.    FUROSEMIDE (LASIX) 20 MG TABLET        GABAPENTIN (NEURONTIN) 300 MG CAPSULE    Take 1 capsule (300 mg total) by mouth 2 (two) times daily.    HUMIDIFIERS MISC    Use nightly as needed for cough, cold, or congestion symptoms.    HYDRALAZINE (APRESOLINE) 100 MG TABLET        INSULIN ASPART PROTAMINE-INSULIN ASPART (NOVOLOG 70/30) 100 UNIT/ML (70-30) INPN PEN    Inject 40 Units into the skin every morning.    INSULIN ASPART U-100 (NOVOLOG) 100 UNIT/ML INJECTION    Inject 40 Units into the skin.    IPRATROPIUM (ATROVENT) 42 MCG (0.06 %) NASAL SPRAY    2 sprays by Nasal route.    LINACLOTIDE (LINZESS) 145 MCG CAP CAPSULE    Take 1 capsule by mouth.    LISINOPRIL 10 MG TABLET    Take 1 tablet by mouth once daily.    METOLAZONE (ZAROXOLYN) 2.5 MG TABLET        NIFEDIPINE (PROCARDIA-XL) 60 MG (OSM) 24 HR TABLET    Take 60 mg by mouth once daily.    NITROGLYCERIN (NITROSTAT) 0.4 MG SL TABLET    Place  "0.4 mg under the tongue every 5 (five) minutes as needed for Chest pain.    NOVOLIN 70/30 U-100 INSULIN 100 UNIT/ML (70-30) INJECTION        NOVOLOG MIX 70-30 U-100 INSULN 100 UNIT/ML (70-30) SOLN        PANTOPRAZOLE (PROTONIX) 40 MG TABLET    Take 40 mg by mouth.    PAZEO 0.7 % DROP        SODIUM CHLORIDE (OCEAN) 0.65 % NASAL SPRAY    1 spray by Nasal route 2 (two) times daily as needed.    XARELTO 2.5 MG TAB           Review of patient's allergies indicates:   Allergen Reactions    Hydrocodone-acetaminophen Itching and Hives    Hydrocodone Hives    Morphine Other (See Comments)     Hallucinations  Hallucinations         Past Surgical History:   Procedure Laterality Date    BREAST BIOPSY      benign, pt states she was about 27yrs old    CARDIAC SURGERY  2013    CATARACT EXTRACTION Right 05/10/2021     SECTION      HYSTERECTOMY      OOPHORECTOMY         Family History   Problem Relation Age of Onset    Hypertension Mother     Diabetes Mother     Hypertension Brother     Diabetes Brother     Seizures Son        Social History     Socioeconomic History    Marital status:    Tobacco Use    Smoking status: Never    Smokeless tobacco: Never   Substance and Sexual Activity    Alcohol use: No    Drug use: No    Sexual activity: Yes     Partners: Male       Vitals:    10/14/22 0818   Weight: 104.3 kg (229 lb 15 oz)   Height: 5' 6" (1.676 m)       Hemoglobin A1C   Date Value Ref Range Status   10/15/2021 7.6 (H) 4.7 - 5.6 % Final   2020 5.9 <=6.5 % Final       Review of Systems   Constitutional:  Negative for chills and fever.   Respiratory:  Negative for shortness of breath.    Cardiovascular:  Negative for chest pain, palpitations, orthopnea, claudication and leg swelling (bilateral feet).   Gastrointestinal:  Negative for diarrhea, nausea and vomiting.   Skin:  Negative for rash.   Neurological:  Positive for tingling and sensory change.   Psychiatric/Behavioral: Negative.             Objective:    "   PHYSICAL EXAM: Apperance: Alert and orient in no distress,well developed, and with good attention to grooming and body habits  Patient presents ambulating in tennis shoes.   LOWER EXTREMITY EXAM:  VASCULAR: Dorsalis pedis pulses 0/4(monphasic with doppler) bilateral and Posterior Tibial pulses 1/4 bilateral. Capillary fill time <4 seconds bilateral. Mild edema observed right foot. Varicosities present bilateral. Skin temperature of the lower extremities is warm to warm, proximal to distal. Hair growth absent bilateral.   DERMATOLOGICAL: No skin rashes, lesions, or ulcers observed bilateral. Nails 1,2,3,4,5 bilateral elongated, thickened, and discolored with subungual debris. Webspaces 1,2,3,4 clean, dry and without evidence of break in skin integrity bilateral. Minimal hyperkeratotic tissue noted to left plantar hallux. Palpable fixated subcutaneous nodule noted to right medial 2nd toe.   NEUROLOGICAL: Light touch, sharp-dull, proprioception all present and equal bilaterally.  Vibratory sensation diminished at bilateral hallux. Protective sensation absent at 4/10 sites as tested with a [place]Br[place] 5.07 monofilament.   MUSCULOSKELETAL: Muscle strength is 5/5 for foot inverters, everters, plantarflexors, and dorsiflexors. Muscle tone is normal.  (+) pain on palpation of left 2nd, 3rd, 4th MPJ. Pain on left 2,3,4 dorsiflexion and plantarflexion.        Assessment:       ICD-10-CM ICD-9-CM   1. Type II diabetes mellitus with neurological manifestations  E11.49 250.60   2. PVD (peripheral vascular disease)  I73.9 443.9   3. Dermatophytosis of nail  B35.1 110.1         Plan:   Type II diabetes mellitus with neurological manifestations    PVD (peripheral vascular disease)    Dermatophytosis of nail    Other orders  -     tiZANidine (ZANAFLEX) 4 MG tablet; Take 1 tablet (4 mg total) by mouth nightly as needed (AS NEED AT NIGHTTIME).  Dispense: 20 tablet; Refill: 0      I counseled the patient on her conditions,  regarding findings of my examination, my impressions, and usual treatment plan.   Greater than 12 minutes of a 15 minute appointment spent on education about the diabetic foot, neuropathy, and prevention of limb loss.  Shoe inspection. Diabetic Foot Education. Patient reminded of the importance of good nutrition and blood sugar control to help prevent podiatric complications of diabetes. Patient instructed on proper foot hygeine. We discussed wearing proper shoe gear, daily foot inspections, never walking without protective shoe gear, never putting sharp instruments to feet.    With patient's permission, nails 1-5 bilateral were debrided/trimmed in length and thickness aggressively to their soft tissue attachment mechanically and with electric , removing all offending nail and debris. Patient relates relief following the procedure.  Prescription written for Tizanidine 4mg to be taken as needed at nighttime.   Patient is scheduled to see her cardiologist on 10/20.   Patient  will continue to monitor the areas daily, inspect feet, wear protective shoe gear when ambulatory, moisturizer to maintain skin integrity. Patient reminded of the importance of good nutrition and blood sugar control to help prevent podiatric complications of diabetes.  Patient to return 3 months or sooner if needed.          Viet Muir DPM  [facility]

## 2022-11-18 ENCOUNTER — PATIENT MESSAGE (OUTPATIENT)
Dept: RESEARCH | Facility: HOSPITAL | Age: 62
End: 2022-11-18
Payer: MEDICARE

## 2022-12-19 ENCOUNTER — OFFICE VISIT (OUTPATIENT)
Dept: PODIATRY | Facility: CLINIC | Age: 62
End: 2022-12-19
Payer: MEDICARE

## 2022-12-19 VITALS — HEIGHT: 66 IN | BODY MASS INDEX: 36.8 KG/M2 | WEIGHT: 229 LBS

## 2022-12-19 DIAGNOSIS — I73.9 PVD (PERIPHERAL VASCULAR DISEASE): ICD-10-CM

## 2022-12-19 DIAGNOSIS — B35.1 DERMATOPHYTOSIS OF NAIL: ICD-10-CM

## 2022-12-19 DIAGNOSIS — E11.49 TYPE II DIABETES MELLITUS WITH NEUROLOGICAL MANIFESTATIONS: Primary | ICD-10-CM

## 2022-12-19 PROCEDURE — 11721 DEBRIDE NAIL 6 OR MORE: CPT | Mod: Q8,S$GLB,, | Performed by: PODIATRIST

## 2022-12-19 PROCEDURE — 99499 UNLISTED E&M SERVICE: CPT | Mod: S$GLB,,, | Performed by: PODIATRIST

## 2022-12-19 PROCEDURE — 1159F MED LIST DOCD IN RCRD: CPT | Mod: CPTII,S$GLB,, | Performed by: PODIATRIST

## 2022-12-19 PROCEDURE — 99499 NO LOS: ICD-10-PCS | Mod: S$GLB,,, | Performed by: PODIATRIST

## 2022-12-19 PROCEDURE — 1159F PR MEDICATION LIST DOCUMENTED IN MEDICAL RECORD: ICD-10-PCS | Mod: CPTII,S$GLB,, | Performed by: PODIATRIST

## 2022-12-19 PROCEDURE — 1160F RVW MEDS BY RX/DR IN RCRD: CPT | Mod: CPTII,S$GLB,, | Performed by: PODIATRIST

## 2022-12-19 PROCEDURE — 99999 PR PBB SHADOW E&M-EST. PATIENT-LVL IV: CPT | Mod: PBBFAC,,, | Performed by: PODIATRIST

## 2022-12-19 PROCEDURE — 1160F PR REVIEW ALL MEDS BY PRESCRIBER/CLIN PHARMACIST DOCUMENTED: ICD-10-PCS | Mod: CPTII,S$GLB,, | Performed by: PODIATRIST

## 2022-12-19 PROCEDURE — 99999 PR PBB SHADOW E&M-EST. PATIENT-LVL IV: ICD-10-PCS | Mod: PBBFAC,,, | Performed by: PODIATRIST

## 2022-12-19 PROCEDURE — 3008F BODY MASS INDEX DOCD: CPT | Mod: CPTII,S$GLB,, | Performed by: PODIATRIST

## 2022-12-19 PROCEDURE — 11721 PR DEBRIDEMENT OF NAILS, 6 OR MORE: ICD-10-PCS | Mod: Q8,S$GLB,, | Performed by: PODIATRIST

## 2022-12-19 PROCEDURE — 3008F PR BODY MASS INDEX (BMI) DOCUMENTED: ICD-10-PCS | Mod: CPTII,S$GLB,, | Performed by: PODIATRIST

## 2023-01-08 NOTE — PROGRESS NOTES
Subjective:     Patient ID: Julia Preston is a 62 y.o. female.    Chief Complaint: Nail Care (C/o numbness ang tingling at night, swelling, Diabetic Pt, wears tennis shoes with socks, last seen on 12/12/22 with PCP Dr. Lutz)      Julia is a 62 y.o. female who presents to the clinic for evaluation and treatment of high risk feet. Julia has a past medical history of CHF (congestive heart failure), Congestive heart failure, Congestive heart failure (2018), Diabetes mellitus, type 2, GERD (gastroesophageal reflux disease), High cholesterol, Hyperlipidemia, Hypertension, Pneumonia (6/2017 or 7/2017 per the patient), and Pneumonia (03/2018). The patient's chief complaint is long, thick toenails. This patient has documented high risk feet requiring routine maintenance secondary to diabetes mellitis and those secondary complications of diabetes, as mentioned.  Patient has no other pedal complaints at this time.     PCP: MD Donna Reid MD (nephrology)  Date Last Seen by PCP: 12/12/2022 (/5/11/2022)    Current shoe gear:  Affected Foot: Tennis shoes     Unaffected Foot: Tennis shoes    Hemoglobin A1C   Date Value Ref Range Status   12/04/2022 5.2 <=6.5 % Final   10/15/2021 7.6 (H) 4.7 - 5.6 % Final   07/01/2020 5.9 <=6.5 % Final       Patient Active Problem List   Diagnosis    GERD (gastroesophageal reflux disease)    Type II diabetes mellitus with peripheral circulatory disorder    Hyperlipidemia    Hypertension    PVD (peripheral vascular disease)    Severe obesity (BMI 35.0-39.9) with comorbidity       Medication List with Changes/Refills   Current Medications    ACCU-CHEK [place]ARA PLUS TEST STRP STRP        ACCU-CHEK SOFTCLIX LANCETS MISC        ACETAMINOPHEN (TYLENOL) 500 MG TABLET    Take 1-2 tablets by mouth.    ASPIRIN (ECOTRIN) 81 MG EC TABLET    Take 81 mg by mouth once daily.    ATORVASTATIN (LIPITOR) 80 MG TABLET    Take 80 mg by mouth once daily.    AZELASTINE (OPTIVAR) 0.05 %  OPHTHALMIC SOLUTION    azelastine 0.05 % eye drops   INSTILL 1 DROP INTO EACH EYE TWICE DAILY    BD ALCOHOL SWABS PADM        BLOOD-GLUCOSE METER MISC    Use to check blood sugar 4 times per day-before meals and before bedtime.    BUMETANIDE (BUMEX) 2 MG TABLET    Take 2 mg by mouth.    CALCIUM CARBONATE (TUMS) 200 MG CALCIUM (500 MG) CHEWABLE TABLET    Take 500 mg by mouth.    CARVEDILOL (COREG) 12.5 MG TABLET        CYANOCOBALAMIN 500 MCG TABLET    Take 500 mcg by mouth.    EPOETIN DOMONIQUE-EPBX 20,000 UNIT/2 ML SOLN    Inject 10,000 Units into the skin.    ERGOCALCIFEROL (ERGOCALCIFEROL) 50,000 UNIT CAP    Take 1 capsule by mouth.    FERROUS SULFATE 325 (65 FE) MG EC TABLET    TAKE 1 TABLET BY MOUTH TWICE DAILY DO NOT CRUSH CHEW OR SPLIT    FLUTICASONE PROPIONATE (FLONASE) 50 MCG/ACTUATION NASAL SPRAY    1 spray by Nasal route.    FOLIC ACID (FOLVITE) 1 MG TABLET    Take 1 tablet by mouth.    FUROSEMIDE (LASIX) 20 MG TABLET        GABAPENTIN (NEURONTIN) 300 MG CAPSULE    Take 1 capsule (300 mg total) by mouth 2 (two) times daily.    HUMIDIFIERS MISC    Use nightly as needed for cough, cold, or congestion symptoms.    HYDRALAZINE (APRESOLINE) 100 MG TABLET        INSULIN ASPART PROTAMINE-INSULIN ASPART (NOVOLOG 70/30) 100 UNIT/ML (70-30) INPN PEN    Inject 40 Units into the skin every morning.    INSULIN ASPART U-100 (NOVOLOG) 100 UNIT/ML INJECTION    Inject 40 Units into the skin.    IPRATROPIUM (ATROVENT) 42 MCG (0.06 %) NASAL SPRAY    2 sprays by Nasal route.    LINACLOTIDE (LINZESS) 145 MCG CAP CAPSULE    Take 1 capsule by mouth.    LISINOPRIL 10 MG TABLET    Take 1 tablet by mouth once daily.    METOLAZONE (ZAROXOLYN) 2.5 MG TABLET        NIFEDIPINE (PROCARDIA-XL) 60 MG (OSM) 24 HR TABLET    Take 60 mg by mouth once daily.    NITROGLYCERIN (NITROSTAT) 0.4 MG SL TABLET    Place 0.4 mg under the tongue every 5 (five) minutes as needed for Chest pain.    NOVOLIN 70/30 U-100 INSULIN 100 UNIT/ML (70-30) INJECTION      "   NOVOLOG MIX 70-30 U-100 INSULN 100 UNIT/ML (70-30) SOLN        PANTOPRAZOLE (PROTONIX) 40 MG TABLET    Take 40 mg by mouth.    PAZEO 0.7 % DROP        SODIUM CHLORIDE (OCEAN) 0.65 % NASAL SPRAY    1 spray by Nasal route 2 (two) times daily as needed.    XARELTO 2.5 MG TAB           Review of patient's allergies indicates:   Allergen Reactions    Hydrocodone-acetaminophen Itching and Hives    Hydrocodone Hives    Morphine Other (See Comments)     Hallucinations  Hallucinations         Past Surgical History:   Procedure Laterality Date    BREAST BIOPSY      benign, pt states she was about 27yrs old    CARDIAC SURGERY  2013    CATARACT EXTRACTION Right 05/10/2021     SECTION      HYSTERECTOMY      OOPHORECTOMY         Family History   Problem Relation Age of Onset    Hypertension Mother     Diabetes Mother     Hypertension Brother     Diabetes Brother     Seizures Son        Social History     Socioeconomic History    Marital status:    Tobacco Use    Smoking status: Never    Smokeless tobacco: Never   Substance and Sexual Activity    Alcohol use: No    Drug use: No    Sexual activity: Yes     Partners: Male       Vitals:    22 1044   Weight: 103.9 kg (229 lb)   Height: 5' 6" (1.676 m)       Hemoglobin A1C   Date Value Ref Range Status   2022 5.2 <=6.5 % Final   10/15/2021 7.6 (H) 4.7 - 5.6 % Final   2020 5.9 <=6.5 % Final       Review of Systems   Constitutional:  Negative for chills and fever.   Respiratory:  Negative for shortness of breath.    Cardiovascular:  Negative for chest pain, palpitations, orthopnea, claudication and leg swelling (bilateral feet).   Gastrointestinal:  Negative for diarrhea, nausea and vomiting.   Skin:  Negative for rash.   Neurological:  Positive for tingling and sensory change.   Psychiatric/Behavioral: Negative.             Objective:      PHYSICAL EXAM: Apperance: Alert and orient in no distress,well developed, and with good attention to grooming and " body habits  Patient presents ambulating in tennis shoes.   LOWER EXTREMITY EXAM:  VASCULAR: Dorsalis pedis pulses 0/4(monphasic with doppler) bilateral and Posterior Tibial pulses 1/4 bilateral. Capillary fill time <4 seconds bilateral. Mild edema observed right foot. Varicosities present bilateral. Skin temperature of the lower extremities is warm to warm, proximal to distal. Hair growth absent bilateral.   DERMATOLOGICAL: No skin rashes, lesions, or ulcers observed bilateral. Nails 1,2,3,4,5 bilateral elongated, thickened, and discolored with subungual debris. Webspaces 1,2,3,4 clean, dry and without evidence of break in skin integrity bilateral. Minimal hyperkeratotic tissue noted to left plantar hallux. Palpable fixated subcutaneous nodule noted to right medial 2nd toe.   NEUROLOGICAL: Light touch, sharp-dull, proprioception all present and equal bilaterally.  Vibratory sensation diminished at bilateral hallux. Protective sensation absent at 4/10 sites as tested with a [place]-Brandy 5.07 monofilament.   MUSCULOSKELETAL: Muscle strength is 5/5 for foot inverters, everters, plantarflexors, and dorsiflexors. Muscle tone is normal.          Assessment:       ICD-10-CM ICD-9-CM   1. Type II diabetes mellitus with neurological manifestations  E11.49 250.60   2. PVD (peripheral vascular disease)  I73.9 443.9   3. Dermatophytosis of nail  B35.1 110.1           Plan:   Type II diabetes mellitus with neurological manifestations    PVD (peripheral vascular disease)    Dermatophytosis of nail          I counseled the patient on her conditions, regarding findings of my examination, my impressions, and usual treatment plan.   Greater than 12 minutes of a 15 minute appointment spent on education about the diabetic foot, neuropathy, and prevention of limb loss.  Shoe inspection. Diabetic Foot Education. Patient reminded of the importance of good nutrition and blood sugar control to help prevent podiatric complications of  diabetes. Patient instructed on proper foot hygeine. We discussed wearing proper shoe gear, daily foot inspections, never walking without protective shoe gear, never putting sharp instruments to feet.    With patient's permission, nails 1-5 bilateral were debrided/trimmed in length and thickness aggressively to their soft tissue attachment mechanically and with electric , removing all offending nail and debris. Patient relates relief following the procedure.  Prescription written for Tizanidine 4mg to be taken as needed at nighttime.   Patient is scheduled to see her cardiologist on 10/20.   Patient  will continue to monitor the areas daily, inspect feet, wear protective shoe gear when ambulatory, moisturizer to maintain skin integrity. Patient reminded of the importance of good nutrition and blood sugar control to help prevent podiatric complications of diabetes.  Patient to return 3 months or sooner if needed.          Viet Muir DPM  [facility]

## 2023-03-09 ENCOUNTER — OFFICE VISIT (OUTPATIENT)
Dept: PODIATRY | Facility: CLINIC | Age: 63
End: 2023-03-09
Payer: MEDICARE

## 2023-03-09 VITALS — BODY MASS INDEX: 36.81 KG/M2 | WEIGHT: 229.06 LBS | HEIGHT: 66 IN

## 2023-03-09 DIAGNOSIS — E11.49 TYPE II DIABETES MELLITUS WITH NEUROLOGICAL MANIFESTATIONS: Primary | ICD-10-CM

## 2023-03-09 DIAGNOSIS — I73.9 PVD (PERIPHERAL VASCULAR DISEASE): ICD-10-CM

## 2023-03-09 DIAGNOSIS — B35.1 DERMATOPHYTOSIS OF NAIL: ICD-10-CM

## 2023-03-09 PROCEDURE — 99499 NO LOS: ICD-10-PCS | Mod: S$GLB,,, | Performed by: PODIATRIST

## 2023-03-09 PROCEDURE — 99999 PR PBB SHADOW E&M-EST. PATIENT-LVL IV: ICD-10-PCS | Mod: PBBFAC,,, | Performed by: PODIATRIST

## 2023-03-09 PROCEDURE — 1160F RVW MEDS BY RX/DR IN RCRD: CPT | Mod: CPTII,S$GLB,, | Performed by: PODIATRIST

## 2023-03-09 PROCEDURE — 11721 DEBRIDE NAIL 6 OR MORE: CPT | Mod: Q8,S$GLB,, | Performed by: PODIATRIST

## 2023-03-09 PROCEDURE — 99499 UNLISTED E&M SERVICE: CPT | Mod: S$GLB,,, | Performed by: PODIATRIST

## 2023-03-09 PROCEDURE — 1160F PR REVIEW ALL MEDS BY PRESCRIBER/CLIN PHARMACIST DOCUMENTED: ICD-10-PCS | Mod: CPTII,S$GLB,, | Performed by: PODIATRIST

## 2023-03-09 PROCEDURE — 99999 PR PBB SHADOW E&M-EST. PATIENT-LVL IV: CPT | Mod: PBBFAC,,, | Performed by: PODIATRIST

## 2023-03-09 PROCEDURE — 11721 PR DEBRIDEMENT OF NAILS, 6 OR MORE: ICD-10-PCS | Mod: Q8,S$GLB,, | Performed by: PODIATRIST

## 2023-03-09 PROCEDURE — 4010F PR ACE/ARB THEARPY RXD/TAKEN: ICD-10-PCS | Mod: CPTII,S$GLB,, | Performed by: PODIATRIST

## 2023-03-09 PROCEDURE — 1159F MED LIST DOCD IN RCRD: CPT | Mod: CPTII,S$GLB,, | Performed by: PODIATRIST

## 2023-03-09 PROCEDURE — 4010F ACE/ARB THERAPY RXD/TAKEN: CPT | Mod: CPTII,S$GLB,, | Performed by: PODIATRIST

## 2023-03-09 PROCEDURE — 1159F PR MEDICATION LIST DOCUMENTED IN MEDICAL RECORD: ICD-10-PCS | Mod: CPTII,S$GLB,, | Performed by: PODIATRIST

## 2023-03-09 PROCEDURE — 3008F BODY MASS INDEX DOCD: CPT | Mod: CPTII,S$GLB,, | Performed by: PODIATRIST

## 2023-03-09 PROCEDURE — 3008F PR BODY MASS INDEX (BMI) DOCUMENTED: ICD-10-PCS | Mod: CPTII,S$GLB,, | Performed by: PODIATRIST

## 2023-03-09 RX ORDER — PREGABALIN 100 MG/1
100 CAPSULE ORAL 2 TIMES DAILY
Qty: 60 CAPSULE | Refills: 1 | Status: SHIPPED | OUTPATIENT
Start: 2023-03-09 | End: 2023-08-31

## 2023-03-09 NOTE — PROGRESS NOTES
Subjective:     Patient ID: Julia Preston is a 62 y.o. female.    Chief Complaint: Nail Care (Diabetic pt wears tennis shoes, PCP Dr. Lutz last seen 1-5-23)      Julia is a 62 y.o. female who presents to the clinic for evaluation and treatment of high risk feet. Julia has a past medical history of CHF (congestive heart failure), Congestive heart failure, Congestive heart failure (2018), Diabetes mellitus, type 2, GERD (gastroesophageal reflux disease), High cholesterol, Hyperlipidemia, Hypertension, Pneumonia (6/2017 or 7/2017 per the patient), and Pneumonia (03/2018). The patient's chief complaint is long, thick toenails. Patient also complains of pain in feet especially a nighttime. Patient states sometimes its so bad she can't put pressure on the feet.  This patient has documented high risk feet requiring routine maintenance secondary to diabetes mellitis and those secondary complications of diabetes, as mentioned.  Patient has no other pedal complaints at this time.     PCP: MD Donna Reid MD (nephrology)  Date Last Seen by PCP: 1/5/2023 (/5/11/2022)    Current shoe gear:  Affected Foot: Tennis shoes     Unaffected Foot: Tennis shoes    Hemoglobin A1C   Date Value Ref Range Status   12/04/2022 5.2 <=6.5 % Final   10/15/2021 7.6 (H) 4.7 - 5.6 % Final   07/01/2020 5.9 <=6.5 % Final       Patient Active Problem List   Diagnosis    GERD (gastroesophageal reflux disease)    Type II diabetes mellitus with peripheral circulatory disorder    Hyperlipidemia    Hypertension    PVD (peripheral vascular disease)    Severe obesity (BMI 35.0-39.9) with comorbidity       Medication List with Changes/Refills   New Medications    PREGABALIN (LYRICA) 100 MG CAPSULE    Take 1 capsule (100 mg total) by mouth 2 (two) times daily.   Current Medications    ACCU-CHEK SIOMARA PLUS TEST STRP STRP        ACCU-CHEK SOFTCLIX LANCETS MISC        ACETAMINOPHEN (TYLENOL) 500 MG TABLET    Take 1-2 tablets by mouth.     ASPIRIN (ECOTRIN) 81 MG EC TABLET    Take 81 mg by mouth once daily.    ATORVASTATIN (LIPITOR) 80 MG TABLET    Take 80 mg by mouth once daily.    AZELASTINE (OPTIVAR) 0.05 % OPHTHALMIC SOLUTION    azelastine 0.05 % eye drops   INSTILL 1 DROP INTO EACH EYE TWICE DAILY    BD ALCOHOL SWABS PADM        BLOOD-GLUCOSE METER MISC    Use to check blood sugar 4 times per day-before meals and before bedtime.    BUMETANIDE (BUMEX) 2 MG TABLET    Take 2 mg by mouth.    CALCIUM CARBONATE (TUMS) 200 MG CALCIUM (500 MG) CHEWABLE TABLET    Take 500 mg by mouth.    CARVEDILOL (COREG) 12.5 MG TABLET        CYANOCOBALAMIN 500 MCG TABLET    Take 500 mcg by mouth.    EMPAGLIFLOZIN (JARDIANCE) 10 MG TABLET    Take 10 mg by mouth.    EPOETIN DOMONIQUE-EPBX 20,000 UNIT/2 ML SOLN    Inject 10,000 Units into the skin.    ERGOCALCIFEROL (ERGOCALCIFEROL) 50,000 UNIT CAP    Take 1 capsule by mouth.    FERROUS SULFATE 325 (65 FE) MG EC TABLET    TAKE 1 TABLET BY MOUTH TWICE DAILY DO NOT CRUSH CHEW OR SPLIT    FLUTICASONE PROPIONATE (FLONASE) 50 MCG/ACTUATION NASAL SPRAY    1 spray by Nasal route.    FOLIC ACID (FOLVITE) 1 MG TABLET    Take 1 tablet by mouth.    FUROSEMIDE (LASIX) 20 MG TABLET        HUMIDIFIERS MISC    Use nightly as needed for cough, cold, or congestion symptoms.    HYDRALAZINE (APRESOLINE) 100 MG TABLET        INSULIN ASPART PROTAMINE-INSULIN ASPART (NOVOLOG 70/30) 100 UNIT/ML (70-30) INPN PEN    Inject 40 Units into the skin every morning.    INSULIN ASPART U-100 (NOVOLOG) 100 UNIT/ML INJECTION    Inject 40 Units into the skin.    IPRATROPIUM (ATROVENT) 42 MCG (0.06 %) NASAL SPRAY    2 sprays by Nasal route.    LINACLOTIDE (LINZESS) 145 MCG CAP CAPSULE    Take 1 capsule by mouth.    LISINOPRIL 10 MG TABLET    Take 1 tablet by mouth once daily.    METOLAZONE (ZAROXOLYN) 2.5 MG TABLET        NIFEDIPINE (PROCARDIA-XL) 60 MG (OSM) 24 HR TABLET    Take 60 mg by mouth once daily.    NITROGLYCERIN (NITROSTAT) 0.4 MG SL TABLET    Place  "0.4 mg under the tongue every 5 (five) minutes as needed for Chest pain.    NOVOLIN 70/30 U-100 INSULIN 100 UNIT/ML (70-30) INJECTION        NOVOLOG MIX 70-30 U-100 INSULN 100 UNIT/ML (70-30) SOLN        PANTOPRAZOLE (PROTONIX) 40 MG TABLET    Take 40 mg by mouth.    PAZEO 0.7 % DROP        SODIUM CHLORIDE (OCEAN) 0.65 % NASAL SPRAY    1 spray by Nasal route 2 (two) times daily as needed.    XARELTO 2.5 MG TAB       Discontinued Medications    GABAPENTIN (NEURONTIN) 300 MG CAPSULE    Take 1 capsule (300 mg total) by mouth 2 (two) times daily.       Review of patient's allergies indicates:   Allergen Reactions    Hydrocodone-acetaminophen Itching and Hives    Hydrocodone Hives    Morphine Other (See Comments)     Hallucinations  Hallucinations         Past Surgical History:   Procedure Laterality Date    BREAST BIOPSY      benign, pt states she was about 27yrs old    CARDIAC SURGERY  2013    CATARACT EXTRACTION Right 05/10/2021     SECTION      HYSTERECTOMY      OOPHORECTOMY         Family History   Problem Relation Age of Onset    Hypertension Mother     Diabetes Mother     Hypertension Brother     Diabetes Brother     Seizures Son        Social History     Socioeconomic History    Marital status:    Tobacco Use    Smoking status: Never    Smokeless tobacco: Never   Substance and Sexual Activity    Alcohol use: No    Drug use: No    Sexual activity: Yes     Partners: Male       Vitals:    23 1006   Weight: 103.9 kg (229 lb 0.9 oz)   Height: 5' 6" (1.676 m)   PainSc: 0-No pain       Hemoglobin A1C   Date Value Ref Range Status   2022 5.2 <=6.5 % Final   10/15/2021 7.6 (H) 4.7 - 5.6 % Final   2020 5.9 <=6.5 % Final       Review of Systems   Constitutional:  Negative for chills and fever.   Respiratory:  Negative for shortness of breath.    Cardiovascular:  Negative for chest pain, palpitations, orthopnea, claudication and leg swelling (bilateral feet).   Gastrointestinal:  Negative for " diarrhea, nausea and vomiting.   Skin:  Negative for rash.   Neurological:  Positive for tingling and sensory change.   Psychiatric/Behavioral: Negative.             Objective:      PHYSICAL EXAM: Apperance: Alert and orient in no distress,well developed, and with good attention to grooming and body habits  Patient presents ambulating in tennis shoes.   LOWER EXTREMITY EXAM:  VASCULAR: Dorsalis pedis pulses 0/4(monphasic with doppler) bilateral and Posterior Tibial pulses 1/4 bilateral. Capillary fill time <4 seconds bilateral. Mild edema observed right foot. Varicosities present bilateral. Skin temperature of the lower extremities is warm to warm, proximal to distal. Hair growth absent bilateral.   DERMATOLOGICAL: No skin rashes, lesions, or ulcers observed bilateral. Nails 1,2,3,4,5 bilateral elongated, thickened, and discolored with subungual debris. Webspaces 1,2,3,4 clean, dry and without evidence of break in skin integrity bilateral. Minimal hyperkeratotic tissue noted to left plantar hallux. Palpable fixated subcutaneous nodule noted to right medial 2nd toe.   NEUROLOGICAL: Light touch, sharp-dull, proprioception all present and equal bilaterally.  Vibratory sensation diminished at bilateral hallux. Protective sensation absent at 4/10 sites as tested with a [place]-Brandy 5.07 monofilament.   MUSCULOSKELETAL: Muscle strength is 5/5 for foot inverters, everters, plantarflexors, and dorsiflexors. Muscle tone is normal.          Assessment:       ICD-10-CM ICD-9-CM   1. Type II diabetes mellitus with neurological manifestations  E11.49 250.60   2. PVD (peripheral vascular disease)  I73.9 443.9   3. Dermatophytosis of nail  B35.1 110.1       Plan:   Type II diabetes mellitus with neurological manifestations  -     pregabalin (LYRICA) 100 MG capsule; Take 1 capsule (100 mg total) by mouth 2 (two) times daily.  Dispense: 60 capsule; Refill: 1    PVD (peripheral vascular disease)    Dermatophytosis of nail    I  counseled the patient on her conditions, regarding findings of my examination, my impressions, and usual treatment plan.   Appointment spent on education about the diabetic foot, neuropathy, and prevention of limb loss.  Shoe inspection. Diabetic Foot Education. Patient reminded of the importance of good nutrition and blood sugar control to help prevent podiatric complications of diabetes. Patient instructed on proper foot hygeine. We discussed wearing proper shoe gear, daily foot inspections, never walking without protective shoe gear, never putting sharp instruments to feet.    With patient's permission, nails 1-5 bilateral were debrided/trimmed in length and thickness aggressively to their soft tissue attachment mechanically and with electric , removing all offending nail and debris. Patient relates relief following the procedure.  Prescription written for Lyrica 100mg to be taken once nightly. Informed patient of possible side effects including but not limited to disorientation and drowsiness. Patient instructed to discontinue use if there are any adverse effects. Patient states she understands.   Patient  will continue to monitor the areas daily, inspect feet, wear protective shoe gear when ambulatory, moisturizer to maintain skin integrity. Patient reminded of the importance of good nutrition and blood sugar control to help prevent podiatric complications of diabetes.  Patient to return 3 months or sooner if needed.          Viet Muir DPM  [facility]

## 2023-08-31 ENCOUNTER — OFFICE VISIT (OUTPATIENT)
Dept: PODIATRY | Facility: CLINIC | Age: 63
End: 2023-08-31
Payer: MEDICARE

## 2023-08-31 ENCOUNTER — TELEPHONE (OUTPATIENT)
Dept: PAIN MEDICINE | Facility: CLINIC | Age: 63
End: 2023-08-31
Payer: MEDICARE

## 2023-08-31 VITALS — WEIGHT: 229.06 LBS | BODY MASS INDEX: 36.81 KG/M2 | HEIGHT: 66 IN

## 2023-08-31 DIAGNOSIS — E11.49 TYPE II DIABETES MELLITUS WITH NEUROLOGICAL MANIFESTATIONS: Primary | ICD-10-CM

## 2023-08-31 DIAGNOSIS — M54.16 LUMBAR RADICULOPATHY: ICD-10-CM

## 2023-08-31 DIAGNOSIS — B35.1 DERMATOPHYTOSIS OF NAIL: ICD-10-CM

## 2023-08-31 DIAGNOSIS — I73.9 PVD (PERIPHERAL VASCULAR DISEASE): ICD-10-CM

## 2023-08-31 DIAGNOSIS — E11.9 ENCOUNTER FOR COMPREHENSIVE DIABETIC FOOT EXAMINATION, TYPE 2 DIABETES MELLITUS: ICD-10-CM

## 2023-08-31 PROCEDURE — 11721 PR DEBRIDEMENT OF NAILS, 6 OR MORE: ICD-10-PCS | Mod: Q9,S$GLB,, | Performed by: PODIATRIST

## 2023-08-31 PROCEDURE — 11721 DEBRIDE NAIL 6 OR MORE: CPT | Mod: Q9,S$GLB,, | Performed by: PODIATRIST

## 2023-08-31 PROCEDURE — 1159F PR MEDICATION LIST DOCUMENTED IN MEDICAL RECORD: ICD-10-PCS | Mod: CPTII,S$GLB,, | Performed by: PODIATRIST

## 2023-08-31 PROCEDURE — 1160F RVW MEDS BY RX/DR IN RCRD: CPT | Mod: CPTII,S$GLB,, | Performed by: PODIATRIST

## 2023-08-31 PROCEDURE — 1160F PR REVIEW ALL MEDS BY PRESCRIBER/CLIN PHARMACIST DOCUMENTED: ICD-10-PCS | Mod: CPTII,S$GLB,, | Performed by: PODIATRIST

## 2023-08-31 PROCEDURE — 99999 PR PBB SHADOW E&M-EST. PATIENT-LVL V: CPT | Mod: PBBFAC,,, | Performed by: PODIATRIST

## 2023-08-31 PROCEDURE — 4010F ACE/ARB THERAPY RXD/TAKEN: CPT | Mod: CPTII,S$GLB,, | Performed by: PODIATRIST

## 2023-08-31 PROCEDURE — 1159F MED LIST DOCD IN RCRD: CPT | Mod: CPTII,S$GLB,, | Performed by: PODIATRIST

## 2023-08-31 PROCEDURE — 3008F BODY MASS INDEX DOCD: CPT | Mod: CPTII,S$GLB,, | Performed by: PODIATRIST

## 2023-08-31 PROCEDURE — 99214 OFFICE O/P EST MOD 30 MIN: CPT | Mod: 25,S$GLB,, | Performed by: PODIATRIST

## 2023-08-31 PROCEDURE — 99999 PR PBB SHADOW E&M-EST. PATIENT-LVL V: ICD-10-PCS | Mod: PBBFAC,,, | Performed by: PODIATRIST

## 2023-08-31 PROCEDURE — 3008F PR BODY MASS INDEX (BMI) DOCUMENTED: ICD-10-PCS | Mod: CPTII,S$GLB,, | Performed by: PODIATRIST

## 2023-08-31 PROCEDURE — 99214 PR OFFICE/OUTPT VISIT, EST, LEVL IV, 30-39 MIN: ICD-10-PCS | Mod: 25,S$GLB,, | Performed by: PODIATRIST

## 2023-08-31 PROCEDURE — 4010F PR ACE/ARB THEARPY RXD/TAKEN: ICD-10-PCS | Mod: CPTII,S$GLB,, | Performed by: PODIATRIST

## 2023-08-31 RX ORDER — CYCLOBENZAPRINE HCL 10 MG
10 TABLET ORAL NIGHTLY PRN
Qty: 30 TABLET | Refills: 1 | Status: SHIPPED | OUTPATIENT
Start: 2023-08-31 | End: 2023-11-06 | Stop reason: SDUPTHER

## 2023-08-31 RX ORDER — PREGABALIN 150 MG/1
150 CAPSULE ORAL 2 TIMES DAILY
Qty: 60 CAPSULE | Refills: 1 | Status: SHIPPED | OUTPATIENT
Start: 2023-08-31

## 2023-09-01 ENCOUNTER — TELEPHONE (OUTPATIENT)
Dept: PAIN MEDICINE | Facility: CLINIC | Age: 63
End: 2023-09-01
Payer: MEDICARE

## 2023-11-06 ENCOUNTER — TELEPHONE (OUTPATIENT)
Dept: PODIATRY | Facility: CLINIC | Age: 63
End: 2023-11-06
Payer: MEDICARE

## 2023-11-06 DIAGNOSIS — M54.16 LUMBAR RADICULOPATHY: ICD-10-CM

## 2023-11-06 RX ORDER — CYCLOBENZAPRINE HCL 10 MG
10 TABLET ORAL NIGHTLY PRN
Qty: 30 TABLET | Refills: 1 | Status: SHIPPED | OUTPATIENT
Start: 2023-11-06

## 2023-11-06 NOTE — TELEPHONE ENCOUNTER
Called the pt to let her know that Dr Muir sent over her medication  to her pharmacy.           ----- Message from Viet Muir DPM sent at 11/6/2023  8:43 AM CST -----  Contact: Kari/daughter  Medication has been refilled and sent to pharmacy.   ----- Message -----  From: Yina Godoy MA  Sent: 10/20/2023  10:41 AM CST  To: Viet Muir DPM      ----- Message -----  From: Malia Quiles  Sent: 10/20/2023   8:57 AM CDT  To: [facility] Staff    Type:  RX Refill Request    Who Called:  Kari  Refill or New Rx: refill   RX Name and Strength: Muscle relaxer ( she is insure of the name of the medication)  How is the patient currently taking it? (ex. 1XDay):   Is this a 30 day or 90 day RX:   Preferred Pharmacy with phone number:   [facility] [id number] [address]  Phone: [phone] Fax: [phone]  Local or Mail Order: Local   Ordering Provider: Dr. Muir   Would the patient rather a call back or a response via [facility]? Call back   Best Call Back Number: Please call her at [phone]  Additional Information:

## 2023-11-30 ENCOUNTER — OFFICE VISIT (OUTPATIENT)
Dept: PODIATRY | Facility: CLINIC | Age: 63
End: 2023-11-30
Payer: MEDICARE

## 2023-11-30 VITALS — BODY MASS INDEX: 36.81 KG/M2 | WEIGHT: 229.06 LBS | HEIGHT: 66 IN

## 2023-11-30 DIAGNOSIS — I73.9 PVD (PERIPHERAL VASCULAR DISEASE): ICD-10-CM

## 2023-11-30 DIAGNOSIS — B35.1 DERMATOPHYTOSIS OF NAIL: ICD-10-CM

## 2023-11-30 DIAGNOSIS — E11.49 TYPE II DIABETES MELLITUS WITH NEUROLOGICAL MANIFESTATIONS: Primary | ICD-10-CM

## 2023-11-30 PROCEDURE — 99499 NO LOS: ICD-10-PCS | Mod: S$GLB,,, | Performed by: PODIATRIST

## 2023-11-30 PROCEDURE — 11721 DEBRIDE NAIL 6 OR MORE: CPT | Mod: Q9,S$GLB,, | Performed by: PODIATRIST

## 2023-11-30 PROCEDURE — 99999 PR PBB SHADOW E&M-EST. PATIENT-LVL IV: CPT | Mod: PBBFAC,,, | Performed by: PODIATRIST

## 2023-11-30 PROCEDURE — 11721 PR DEBRIDEMENT OF NAILS, 6 OR MORE: ICD-10-PCS | Mod: Q9,S$GLB,, | Performed by: PODIATRIST

## 2023-11-30 PROCEDURE — 99499 UNLISTED E&M SERVICE: CPT | Mod: S$GLB,,, | Performed by: PODIATRIST

## 2023-11-30 PROCEDURE — 99999 PR PBB SHADOW E&M-EST. PATIENT-LVL IV: ICD-10-PCS | Mod: PBBFAC,,, | Performed by: PODIATRIST

## 2023-11-30 NOTE — PROGRESS NOTES
Subjective:     Patient ID: Julia Preston is a 63 y.o. female.    Chief Complaint: Follow-up (Foot check, no pain, wearing tennis shoes, diabetic pt, last seen pcp Dr. Garsia 11/03/23. )    Julia is a 63 y.o. female who presents to the clinic for evaluation and treatment of high risk feet. Julia has a past medical history of CHF (congestive heart failure), Congestive heart failure, Congestive heart failure (2018), Diabetes mellitus, type 2, GERD (gastroesophageal reflux disease), High cholesterol, Hyperlipidemia, Hypertension, Pneumonia (6/2017 or 7/2017 per the patient), and Pneumonia (03/2018). The patient's chief complaint is long, thick toenails. Patient also complains of pain in feet especially a nighttime. Patient states sometimes its so bad she can't put pressure on the feet. Patient rates pain 9/10.  This patient has documented high risk feet requiring routine maintenance secondary to diabetes mellitis and those secondary complications of diabetes, as mentioned.  Patient has no other pedal complaints at this time.     PCP: Mana Flores MD Emily Burke, MD (nephrology)  Date Last Seen by PCP: 11/03/2023 (5/31/2023)    Current shoe gear:  Affected Foot: Tennis shoes     Unaffected Foot: Tennis shoes    Hemoglobin A1C   Date Value Ref Range Status   12/04/2022 5.2 <=6.5 % Final   10/15/2021 7.6 (H) 4.7 - 5.6 % Final   07/01/2020 5.9 <=6.5 % Final       Patient Active Problem List   Diagnosis    GERD (gastroesophageal reflux disease)    Type II diabetes mellitus with peripheral circulatory disorder    Hyperlipidemia    Hypertension    PVD (peripheral vascular disease)    Severe obesity (BMI 35.0-39.9) with comorbidity       Medication List with Changes/Refills   Current Medications    ACCU-CHEK [place] PLUS TEST STRP STRP        ACCU-CHEK SOFTCLIX LANCETS MISC        ACETAMINOPHEN (TYLENOL) 500 MG TABLET    Take 1-2 tablets by mouth.    ASPIRIN (ECOTRIN) 81 MG EC TABLET    Take 81 mg by  mouth once daily.    ATORVASTATIN (LIPITOR) 80 MG TABLET    Take 80 mg by mouth once daily.    AZELASTINE (OPTIVAR) 0.05 % OPHTHALMIC SOLUTION    azelastine 0.05 % eye drops   INSTILL 1 DROP INTO EACH EYE TWICE DAILY    BD ALCOHOL SWABS PADM        BLOOD-GLUCOSE METER MISC    Use to check blood sugar 4 times per day-before meals and before bedtime.    BUMETANIDE (BUMEX) 2 MG TABLET    Take 2 mg by mouth.    CALCIUM CARBONATE (TUMS) 200 MG CALCIUM (500 MG) CHEWABLE TABLET    Take 500 mg by mouth.    CARVEDILOL (COREG) 12.5 MG TABLET        CYANOCOBALAMIN 500 MCG TABLET    Take 500 mcg by mouth.    CYCLOBENZAPRINE (FLEXERIL) 10 MG TABLET    Take 1 tablet (10 mg total) by mouth nightly as needed.    EPOETIN DOMONIQUE-EPBX 20,000 UNIT/2 ML SOLN    Inject 10,000 Units into the skin.    ERGOCALCIFEROL (ERGOCALCIFEROL) 50,000 UNIT CAP    Take 1 capsule by mouth.    FERROUS SULFATE 325 (65 FE) MG EC TABLET    TAKE 1 TABLET BY MOUTH TWICE DAILY DO NOT CRUSH CHEW OR SPLIT    FLUTICASONE PROPIONATE (FLONASE) 50 MCG/ACTUATION NASAL SPRAY    1 spray by Nasal route.    FOLIC ACID (FOLVITE) 1 MG TABLET    Take 1 tablet by mouth.    FUROSEMIDE (LASIX) 20 MG TABLET        HUMIDIFIERS MISC    Use nightly as needed for cough, cold, or congestion symptoms.    HYDRALAZINE (APRESOLINE) 100 MG TABLET        INSULIN ASPART PROTAMINE-INSULIN ASPART (NOVOLOG 70/30) 100 UNIT/ML (70-30) INPN PEN    Inject 40 Units into the skin every morning.    INSULIN ASPART U-100 (NOVOLOG) 100 UNIT/ML INJECTION    Inject 40 Units into the skin.    LINACLOTIDE (LINZESS) 145 MCG CAP CAPSULE    Take 1 capsule by mouth.    LISINOPRIL 10 MG TABLET    Take 1 tablet by mouth once daily.    METOLAZONE (ZAROXOLYN) 2.5 MG TABLET        NIFEDIPINE (PROCARDIA-XL) 60 MG (OSM) 24 HR TABLET    Take 60 mg by mouth once daily.    NITROGLYCERIN (NITROSTAT) 0.4 MG SL TABLET    Place 0.4 mg under the tongue every 5 (five) minutes as needed for Chest pain.    NOVOLIN 70/30 U-100  "INSULIN 100 UNIT/ML (70-30) INJECTION        NOVOLOG MIX 70-30 U-100 INSULN 100 UNIT/ML (70-30) SOLN        PANTOPRAZOLE (PROTONIX) 40 MG TABLET    Take 40 mg by mouth.    PAZEO 0.7 % DROP        PREGABALIN (LYRICA) 150 MG CAPSULE    Take 1 capsule (150 mg total) by mouth 2 (two) times daily.    SODIUM CHLORIDE (OCEAN) 0.65 % NASAL SPRAY    1 spray by Nasal route 2 (two) times daily as needed.    XARELTO 2.5 MG TAB           Review of patient's allergies indicates:   Allergen Reactions    Hydrocodone-acetaminophen Itching and Hives    Hydrocodone Hives    Morphine Other (See Comments)     Hallucinations  Hallucinations         Past Surgical History:   Procedure Laterality Date    BREAST BIOPSY      benign, pt states she was about 27yrs old    CARDIAC SURGERY  2013    CATARACT EXTRACTION Right 05/10/2021     SECTION      HYSTERECTOMY      OOPHORECTOMY         Family History   Problem Relation Age of Onset    Hypertension Mother     Diabetes Mother     Hypertension Brother     Diabetes Brother     Seizures Son        Social History     Socioeconomic History    Marital status:    Tobacco Use    Smoking status: Never    Smokeless tobacco: Never   Substance and Sexual Activity    Alcohol use: No    Drug use: No    Sexual activity: Yes     Partners: Male       Vitals:    23 0806   Weight: 103.9 kg (229 lb 0.9 oz)   Height: 5' 6" (1.676 m)   PainSc: 0-No pain       Hemoglobin A1C   Date Value Ref Range Status   2022 5.2 <=6.5 % Final   10/15/2021 7.6 (H) 4.7 - 5.6 % Final   2020 5.9 <=6.5 % Final       Review of Systems   Constitutional:  Negative for chills and fever.   Respiratory:  Negative for shortness of breath.    Cardiovascular:  Negative for chest pain, palpitations, orthopnea, claudication and leg swelling (bilateral feet).   Gastrointestinal:  Negative for diarrhea, nausea and vomiting.   Skin:  Negative for rash.   Neurological:  Positive for tingling and sensory change. "   Psychiatric/Behavioral: Negative.             Objective:      PHYSICAL EXAM: Apperance: Alert and orient in no distress,well developed, and with good attention to grooming and body habits  Patient presents ambulating in tennis shoes.   LOWER EXTREMITY EXAM:  VASCULAR: Dorsalis pedis pulses 0/4(monphasic with doppler) bilateral and Posterior Tibial pulses 1/4 bilateral. Capillary fill time <4 seconds bilateral. Mild edema observed right foot. Varicosities present bilateral. Skin temperature of the lower extremities is warm to warm, proximal to distal. Hair growth absent bilateral.   DERMATOLOGICAL: No skin rashes, lesions, or ulcers observed bilateral. Nails 1,2,3,4,5 bilateral elongated, thickened, and discolored with subungual debris. Webspaces 1,2,3,4 clean, dry and without evidence of break in skin integrity bilateral. Minimal hyperkeratotic tissue noted to left plantar hallux. Palpable fixated subcutaneous nodule noted to right medial 2nd toe.   NEUROLOGICAL: Light touch, sharp-dull, proprioception all present and equal bilaterally.  Vibratory sensation diminished at bilateral hallux. Protective sensation absent at 4/10 sites as tested with a [place]-Brandy 5.07 monofilament.   MUSCULOSKELETAL: Muscle strength is 5/5 for foot inverters, everters, plantarflexors, and dorsiflexors. Muscle tone is normal.          Assessment:       ICD-10-CM ICD-9-CM   1. Type II diabetes mellitus with neurological manifestations  E11.49 250.60   2. PVD (peripheral vascular disease)  I73.9 443.9   3. Dermatophytosis of nail  B35.1 110.1         Plan:   Type II diabetes mellitus with neurological manifestations    PVD (peripheral vascular disease)    Dermatophytosis of nail    I counseled the patient on her conditions, regarding findings of my examination, my impressions, and usual treatment plan.   This visit spent on counseling and coordination of care.  Appointment spent on education about the diabetic foot, neuropathy, and  prevention of limb loss.  Shoe inspection. Diabetic Foot Education. Patient reminded of the importance of good nutrition and blood sugar control to help prevent podiatric complications of diabetes. Patient instructed on proper foot hygeine. We discussed wearing proper shoe gear, daily foot inspections, never walking without protective shoe gear, never putting sharp instruments to feet.    With patient's permission, nails 1-5 bilateral were debrided/trimmed in length and thickness aggressively to their soft tissue attachment mechanically and with electric , removing all offending nail and debris. Patient relates relief following the procedure.  Referral completed for IPM.  Patient  will continue to monitor the areas daily, inspect feet, wear protective shoe gear when ambulatory, moisturizer to maintain skin integrity. Patient reminded of the importance of good nutrition and blood sugar control to help prevent podiatric complications of diabetes.  Patient to return 3 months or sooner if needed.          Viet Muir DPM  [facility]

## 2023-12-26 ENCOUNTER — TELEPHONE (OUTPATIENT)
Dept: PAIN MEDICINE | Facility: CLINIC | Age: 63
End: 2023-12-26
Payer: MEDICARE

## 2023-12-26 NOTE — TELEPHONE ENCOUNTER
Attempt to call patient to confirm appointment. Patent did not answer, Left message on patients voice mail to call back at earliest convenience to confirm or reschedule p.t apt.

## 2024-03-13 ENCOUNTER — PATIENT OUTREACH (OUTPATIENT)
Dept: ADMINISTRATIVE | Facility: CLINIC | Age: 64
End: 2024-03-13
Payer: MEDICARE

## 2024-07-31 ENCOUNTER — OFFICE VISIT (OUTPATIENT)
Dept: PODIATRY | Facility: CLINIC | Age: 64
End: 2024-07-31
Payer: MEDICARE

## 2024-07-31 VITALS — BODY MASS INDEX: 36.81 KG/M2 | HEIGHT: 66 IN | WEIGHT: 229.06 LBS

## 2024-07-31 DIAGNOSIS — E11.49 TYPE II DIABETES MELLITUS WITH NEUROLOGICAL MANIFESTATIONS: ICD-10-CM

## 2024-07-31 DIAGNOSIS — I73.9 PVD (PERIPHERAL VASCULAR DISEASE): ICD-10-CM

## 2024-07-31 DIAGNOSIS — E11.9 ENCOUNTER FOR COMPREHENSIVE DIABETIC FOOT EXAMINATION, TYPE 2 DIABETES MELLITUS: Primary | ICD-10-CM

## 2024-07-31 DIAGNOSIS — B35.1 DERMATOPHYTOSIS OF NAIL: ICD-10-CM

## 2024-07-31 PROCEDURE — 99999 PR PBB SHADOW E&M-EST. PATIENT-LVL IV: CPT | Mod: PBBFAC,,, | Performed by: PODIATRIST

## 2024-07-31 PROCEDURE — 1160F RVW MEDS BY RX/DR IN RCRD: CPT | Mod: CPTII,S$GLB,, | Performed by: PODIATRIST

## 2024-07-31 PROCEDURE — 3008F BODY MASS INDEX DOCD: CPT | Mod: CPTII,S$GLB,, | Performed by: PODIATRIST

## 2024-07-31 PROCEDURE — 1126F AMNT PAIN NOTED NONE PRSNT: CPT | Mod: CPTII,S$GLB,, | Performed by: PODIATRIST

## 2024-07-31 PROCEDURE — 99213 OFFICE O/P EST LOW 20 MIN: CPT | Mod: 25,S$GLB,, | Performed by: PODIATRIST

## 2024-07-31 PROCEDURE — 3044F HG A1C LEVEL LT 7.0%: CPT | Mod: CPTII,S$GLB,, | Performed by: PODIATRIST

## 2024-07-31 PROCEDURE — 1101F PT FALLS ASSESS-DOCD LE1/YR: CPT | Mod: CPTII,S$GLB,, | Performed by: PODIATRIST

## 2024-07-31 PROCEDURE — 1159F MED LIST DOCD IN RCRD: CPT | Mod: CPTII,S$GLB,, | Performed by: PODIATRIST

## 2024-07-31 PROCEDURE — 11721 DEBRIDE NAIL 6 OR MORE: CPT | Mod: Q8,S$GLB,, | Performed by: PODIATRIST

## 2024-07-31 PROCEDURE — 3288F FALL RISK ASSESSMENT DOCD: CPT | Mod: CPTII,S$GLB,, | Performed by: PODIATRIST

## 2024-07-31 NOTE — PROGRESS NOTES
Subjective:     Patient ID: Julia Preston is a 63 y.o. female.    Chief Complaint: Follow-up (Foot check, diabetic pt wears tennis shoes, pt c/o 0/10 pain, PCP Dr. Lutz last seen 4-17-24)    Julia is a 63 y.o. female who presents to the clinic for evaluation and treatment of high risk feet. Julia has a past medical history of CHF (congestive heart failure), Congestive heart failure, Congestive heart failure (2018), Diabetes mellitus, type 2, GERD (gastroesophageal reflux disease), High cholesterol, Hyperlipidemia, Hypertension, Pneumonia (6/2017 or 7/2017 per the patient), and Pneumonia (03/2018). The patient's chief complaint is long, thick toenails. Patent states she was placed on dialysis in March and goes T, Thrus, Sat.  This patient has documented high risk feet requiring routine maintenance secondary to diabetes mellitis and those secondary complications of diabetes, as mentioned.  Patient has no other pedal complaints at this time.     PCP: Mana Flores MD    Date Last Seen by PCP: 04/17/2024     Current shoe gear:  Affected Foot: Tennis shoes     Unaffected Foot: Tennis shoes    Hemoglobin A1C   Date Value Ref Range Status   06/20/2024 5.9 4.8 - 5.9 % Final   05/09/2024 5.9 4.8 - 5.9 % Final   03/04/2024 6.7 (H) <=6.5 % Final       Patient Active Problem List   Diagnosis    GERD (gastroesophageal reflux disease)    Type II diabetes mellitus with peripheral circulatory disorder    Hyperlipidemia    Hypertension    PVD (peripheral vascular disease)    Severe obesity (BMI 35.0-39.9) with comorbidity       Medication List with Changes/Refills   Current Medications    ACCU-CHEK SIOMARA PLUS TEST STRP STRP        ACCU-CHEK SOFTCLIX LANCETS MISC        ACETAMINOPHEN (TYLENOL) 500 MG TABLET    Take 1-2 tablets by mouth.    ASPIRIN (ECOTRIN) 81 MG EC TABLET    Take 81 mg by mouth once daily.    ATORVASTATIN (LIPITOR) 80 MG TABLET    Take 80 mg by mouth once daily.    AZELASTINE (OPTIVAR) 0.05 %  OPHTHALMIC SOLUTION    azelastine 0.05 % eye drops   INSTILL 1 DROP INTO EACH EYE TWICE DAILY    BD ALCOHOL SWABS PADM        BLOOD-GLUCOSE METER MISC    Use to check blood sugar 4 times per day-before meals and before bedtime.    BUMETANIDE (BUMEX) 2 MG TABLET    Take 2 mg by mouth.    CALCIUM CARBONATE (TUMS) 200 MG CALCIUM (500 MG) CHEWABLE TABLET    Take 500 mg by mouth.    CARVEDILOL (COREG) 12.5 MG TABLET        CYANOCOBALAMIN 500 MCG TABLET    Take 500 mcg by mouth.    CYCLOBENZAPRINE (FLEXERIL) 10 MG TABLET    Take 1 tablet (10 mg total) by mouth nightly as needed.    EPOETIN DOMONIQUE-EPBX 20,000 UNIT/2 ML SOLN    Inject 10,000 Units into the skin.    ERGOCALCIFEROL (ERGOCALCIFEROL) 50,000 UNIT CAP    Take 1 capsule by mouth.    FERROUS SULFATE 325 (65 FE) MG EC TABLET    TAKE 1 TABLET BY MOUTH TWICE DAILY DO NOT CRUSH CHEW OR SPLIT    FLUTICASONE PROPIONATE (FLONASE) 50 MCG/ACTUATION NASAL SPRAY    1 spray by Nasal route.    FOLIC ACID (FOLVITE) 1 MG TABLET    Take 1 tablet by mouth.    FUROSEMIDE (LASIX) 20 MG TABLET        HUMIDIFIERS MISC    Use nightly as needed for cough, cold, or congestion symptoms.    HYDRALAZINE (APRESOLINE) 100 MG TABLET        INSULIN ASPART PROTAMINE-INSULIN ASPART (NOVOLOG 70/30) 100 UNIT/ML (70-30) INPN PEN    Inject 40 Units into the skin every morning.    INSULIN ASPART U-100 (NOVOLOG) 100 UNIT/ML INJECTION    Inject 40 Units into the skin.    LINACLOTIDE (LINZESS) 145 MCG CAP CAPSULE    Take 1 capsule by mouth.    LISINOPRIL 10 MG TABLET    Take 1 tablet by mouth once daily.    METOLAZONE (ZAROXOLYN) 2.5 MG TABLET        NIFEDIPINE (PROCARDIA-XL) 60 MG (OSM) 24 HR TABLET    Take 60 mg by mouth once daily.    NITROGLYCERIN (NITROSTAT) 0.4 MG SL TABLET    Place 0.4 mg under the tongue every 5 (five) minutes as needed for Chest pain.    NOVOLIN 70/30 U-100 INSULIN 100 UNIT/ML (70-30) INJECTION        NOVOLOG MIX 70-30 U-100 INSULN 100 UNIT/ML (70-30) SOLN        PANTOPRAZOLE  (PROTONIX) 40 MG TABLET    Take 40 mg by mouth.    PAZEO 0.7 % DROP        PREGABALIN (LYRICA) 150 MG CAPSULE    Take 1 capsule (150 mg total) by mouth 2 (two) times daily.    SODIUM CHLORIDE (OCEAN) 0.65 % NASAL SPRAY    1 spray by Nasal route 2 (two) times daily as needed.    XARELTO 2.5 MG TAB           Review of patient's allergies indicates:   Allergen Reactions    Hydrocodone-acetaminophen Itching and Hives    Hydrocodone Hives    Morphine Other (See Comments)     Hallucinations  Hallucinations         Past Surgical History:   Procedure Laterality Date    BREAST BIOPSY      benign, pt states she was about 27yrs old    CARDIAC SURGERY  2013    CATARACT EXTRACTION Right 05/10/2021     SECTION      HYSTERECTOMY      OOPHORECTOMY         Family History   Problem Relation Name Age of Onset    Hypertension Mother      Diabetes Mother      Hypertension Brother      Diabetes Brother      Seizures Son         Social History     Socioeconomic History    Marital status:    Tobacco Use    Smoking status: Never    Smokeless tobacco: Never   Substance and Sexual Activity    Alcohol use: No    Drug use: No    Sexual activity: Yes     Partners: Male     Social Determinants of Health     Food Insecurity: Patient Declined (2024)    [facility]    Hunger Vital Sign     Worried About Running Out of Food in the Last Year: Patient declined     Ran Out of Food in the Last Year: Patient declined   Transportation Needs: Patient Declined (2024)    [facility]    PRAPARE - Transportation     Lack of Transportation (Medical): Patient declined     Lack of Transportation (Non-Medical): Patient declined   Stress: No Stress Concern Present (2024)    [facility]    [place] [employer] - Occupational Stress Questionnaire     Feeling of Stress : Not at all   Housing Stability: Patient Declined (7/26/2024)    Received from [facility] Its Subsidiaries and Affiliates    Housing Stability Vital Sign     Unable to Pay for Housing in the Last Year: Patient declined     Number of Times Moved in the Last Year: 0     Homeless in the Last Year: Patient declined       Vitals:    07/31/24 1509   Weight: 103.9 kg (229 lb 0.9 oz)   Height: 5' 6" (1.676 m)   PainSc: 0-No pain       Hemoglobin A1C   Date Value Ref Range Status   06/20/2024 5.9 4.8 - 5.9 % Final   05/09/2024 5.9 4.8 - 5.9 % Final   03/04/2024 6.7 (H) <=6.5 % Final       Review of Systems   Constitutional:  Negative for chills and fever.   Respiratory:  Negative for shortness of breath.    Cardiovascular:  Negative for chest pain, palpitations, orthopnea, claudication and leg swelling (bilateral feet).   Gastrointestinal:  Negative for diarrhea, nausea and vomiting.   Skin:  Negative for rash.   Neurological:  Positive for tingling and sensory change.   Psychiatric/Behavioral: Negative.             Objective:      PHYSICAL EXAM: Apperance: Alert and orient in no distress,well developed, and with good attention to grooming and body habits  Patient presents ambulating in tennis shoes.   LOWER EXTREMITY EXAM:  VASCULAR: Dorsalis pedis pulses 0/4(monphasic with doppler) bilateral and Posterior Tibial pulses 1/4 bilateral. Capillary fill time <4 seconds bilateral. Mild edema observed right foot. Varicosities present bilateral. Skin temperature of the lower extremities is warm to warm, proximal to distal. Hair growth absent bilateral.   DERMATOLOGICAL: No skin rashes, lesions, or ulcers observed bilateral. Nails 1,2,3,4,5 bilateral elongated, thickened, and discolored with subungual debris. Webspaces 1,2,3,4 clean, dry and without evidence of break in skin " integrity bilateral. Minimal hyperkeratotic tissue noted to left plantar hallux. Palpable fixated subcutaneous nodule noted to right medial 2nd toe.   NEUROLOGICAL: Light touch, sharp-dull, proprioception all present and equal bilaterally.  Vibratory sensation diminished at bilateral hallux. Protective sensation absent at 4/10 sites as tested with a [place]-Brandy 5.07 monofilament.   MUSCULOSKELETAL: Muscle strength is 5/5 for foot inverters, everters, plantarflexors, and dorsiflexors. Muscle tone is normal.          Assessment:       ICD-10-CM ICD-9-CM   1. Encounter for comprehensive diabetic foot examination, type 2 diabetes mellitus  E11.9 250.00   2. Type II diabetes mellitus with neurological manifestations  E11.49 250.60   3. PVD (peripheral vascular disease)  I73.9 443.9   4. Dermatophytosis of nail  B35.1 110.1         Plan:   Encounter for comprehensive diabetic foot examination, type 2 diabetes mellitus    Type II diabetes mellitus with neurological manifestations    PVD (peripheral vascular disease)    Dermatophytosis of nail    I counseled the patient on her conditions, regarding findings of my examination, my impressions, and usual treatment plan.   This visit spent on counseling and coordination of care.  Appointment spent on education about the diabetic foot, neuropathy, and prevention of limb loss.  Shoe inspection. Diabetic Foot Education. Patient reminded of the importance of good nutrition and blood sugar control to help prevent podiatric complications of diabetes. Patient instructed on proper foot hygeine. We discussed wearing proper shoe gear, daily foot inspections, never walking without protective shoe gear, never putting sharp instruments to feet.    With patient's permission, nails 1-5 bilateral were debrided/trimmed in length and thickness aggressively to their soft tissue attachment mechanically and with electric , removing all offending nail and debris. Patient relates relief  following the procedure.  Patient  will continue to monitor the areas daily, inspect feet, wear protective shoe gear when ambulatory, moisturizer to maintain skin integrity. Patient reminded of the importance of good nutrition and blood sugar control to help prevent podiatric complications of diabetes.  Patient to return 3 months or sooner if needed.          Viet Muir DPM  [facility]

## 2024-10-30 ENCOUNTER — OFFICE VISIT (OUTPATIENT)
Dept: PODIATRY | Facility: CLINIC | Age: 64
End: 2024-10-30
Payer: MEDICARE

## 2024-10-30 VITALS — HEIGHT: 66 IN | WEIGHT: 229.06 LBS | BODY MASS INDEX: 36.81 KG/M2

## 2024-10-30 DIAGNOSIS — I73.9 PVD (PERIPHERAL VASCULAR DISEASE): ICD-10-CM

## 2024-10-30 DIAGNOSIS — E11.49 TYPE II DIABETES MELLITUS WITH NEUROLOGICAL MANIFESTATIONS: Primary | ICD-10-CM

## 2024-10-30 DIAGNOSIS — B35.1 DERMATOPHYTOSIS OF NAIL: ICD-10-CM

## 2024-10-30 PROCEDURE — 99999 PR PBB SHADOW E&M-EST. PATIENT-LVL IV: CPT | Mod: PBBFAC,,, | Performed by: PODIATRIST

## 2024-10-30 RX ORDER — HYDROCODONE BITARTRATE AND ACETAMINOPHEN 10; 325 MG/1; MG/1
1 TABLET ORAL EVERY 6 HOURS PRN
COMMUNITY
Start: 2024-08-08

## 2024-10-30 RX ORDER — TRAMADOL HYDROCHLORIDE 50 MG/1
1 TABLET ORAL EVERY MORNING
COMMUNITY

## 2024-10-30 RX ORDER — GABAPENTIN 300 MG/1
1 CAPSULE ORAL 2 TIMES DAILY
COMMUNITY

## 2024-10-30 RX ORDER — HYOSCYAMINE SULFATE 0.12 MG/1
0.12 TABLET SUBLINGUAL EVERY 6 HOURS PRN
COMMUNITY
Start: 2024-10-28

## 2024-10-30 RX ORDER — ONDANSETRON HYDROCHLORIDE 4 MG/5ML
4 SOLUTION ORAL 2 TIMES DAILY PRN
COMMUNITY

## 2024-10-30 RX ORDER — IPRATROPIUM BROMIDE 42 UG/1
2 SPRAY, METERED NASAL
COMMUNITY